# Patient Record
Sex: FEMALE | Race: AMERICAN INDIAN OR ALASKA NATIVE
[De-identification: names, ages, dates, MRNs, and addresses within clinical notes are randomized per-mention and may not be internally consistent; named-entity substitution may affect disease eponyms.]

---

## 2017-07-01 ENCOUNTER — HOSPITAL ENCOUNTER (EMERGENCY)
Dept: HOSPITAL 5 - ED | Age: 67
LOS: 1 days | Discharge: HOME | End: 2017-07-02
Payer: MEDICARE

## 2017-07-01 VITALS — SYSTOLIC BLOOD PRESSURE: 133 MMHG | DIASTOLIC BLOOD PRESSURE: 83 MMHG

## 2017-07-01 DIAGNOSIS — K80.20: Primary | ICD-10-CM

## 2017-07-01 DIAGNOSIS — J96.12: ICD-10-CM

## 2017-07-01 DIAGNOSIS — F17.200: ICD-10-CM

## 2017-07-01 DIAGNOSIS — I10: ICD-10-CM

## 2017-07-01 DIAGNOSIS — J44.9: ICD-10-CM

## 2017-07-01 DIAGNOSIS — R10.9: ICD-10-CM

## 2017-07-01 DIAGNOSIS — I50.9: ICD-10-CM

## 2017-07-01 DIAGNOSIS — E87.1: ICD-10-CM

## 2017-07-01 PROCEDURE — 71010: CPT

## 2017-07-01 PROCEDURE — 96360 HYDRATION IV INFUSION INIT: CPT

## 2017-07-01 PROCEDURE — 36415 COLL VENOUS BLD VENIPUNCTURE: CPT

## 2017-07-01 PROCEDURE — 85025 COMPLETE CBC W/AUTO DIFF WBC: CPT

## 2017-07-01 PROCEDURE — 83690 ASSAY OF LIPASE: CPT

## 2017-07-01 PROCEDURE — 96361 HYDRATE IV INFUSION ADD-ON: CPT

## 2017-07-01 PROCEDURE — 87086 URINE CULTURE/COLONY COUNT: CPT

## 2017-07-01 PROCEDURE — 99285 EMERGENCY DEPT VISIT HI MDM: CPT

## 2017-07-01 PROCEDURE — 74176 CT ABD & PELVIS W/O CONTRAST: CPT

## 2017-07-01 PROCEDURE — 80053 COMPREHEN METABOLIC PANEL: CPT

## 2017-07-01 PROCEDURE — 81001 URINALYSIS AUTO W/SCOPE: CPT

## 2017-07-01 PROCEDURE — 83880 ASSAY OF NATRIURETIC PEPTIDE: CPT

## 2017-07-02 LAB
ALBUMIN SERPL-MCNC: 3.7 G/DL (ref 3.9–5)
ALBUMIN/GLOB SERPL: 1.1 %
ALP SERPL-CCNC: 81 UNITS/L (ref 35–129)
ALT SERPL-CCNC: 7 UNITS/L (ref 7–56)
ANION GAP SERPL CALC-SCNC: 13 MMOL/L
BACTERIA #/AREA URNS HPF: (no result) /HPF
BASOPHILS NFR BLD AUTO: 0.4 % (ref 0–1.8)
BILIRUB SERPL-MCNC: 0.6 MG/DL (ref 0.1–1.2)
BILIRUB UR QL STRIP: (no result)
BLOOD UR QL VISUAL: (no result)
BUN SERPL-MCNC: 7 MG/DL (ref 7–17)
BUN/CREAT SERPL: 11.66 %
CALCIUM SERPL-MCNC: 8.8 MG/DL (ref 8.4–10.2)
CHLORIDE SERPL-SCNC: 84.5 MMOL/L (ref 98–107)
CO2 SERPL-SCNC: 37 MMOL/L (ref 22–30)
EOSINOPHIL NFR BLD AUTO: 0.5 % (ref 0–4.3)
GLUCOSE SERPL-MCNC: 97 MG/DL (ref 65–100)
HCT VFR BLD CALC: 53 % (ref 30.3–42.9)
HGB BLD-MCNC: 16.7 GM/DL (ref 10.1–14.3)
KETONES UR STRIP-MCNC: (no result) MG/DL
LEUKOCYTE ESTERASE UR QL STRIP: (no result)
LIPASE SERPL-CCNC: 17 UNITS/L (ref 13–60)
MCH RBC QN AUTO: 28 PG (ref 28–32)
MCHC RBC AUTO-ENTMCNC: 32 % (ref 30–34)
MCV RBC AUTO: 90 FL (ref 79–97)
MUCOUS THREADS #/AREA URNS HPF: (no result) /HPF
NITRITE UR QL STRIP: (no result)
PH UR STRIP: 5 [PH] (ref 5–7)
PLATELET # BLD: 149 K/MM3 (ref 140–440)
POTASSIUM SERPL-SCNC: 4.1 MMOL/L (ref 3.6–5)
PROT SERPL-MCNC: 7.2 G/DL (ref 6.3–8.2)
RBC # BLD AUTO: 5.91 M/MM3 (ref 3.65–5.03)
RBC #/AREA URNS HPF: 5 /HPF (ref 0–6)
SODIUM SERPL-SCNC: 130 MMOL/L (ref 137–145)
UROBILINOGEN UR-MCNC: 2 MG/DL (ref ?–2)
WBC # BLD AUTO: 7.4 K/MM3 (ref 4.5–11)
WBC #/AREA URNS HPF: 4 /HPF (ref 0–6)

## 2017-07-02 NOTE — XRAY REPORT
AP CHEST:



HISTORY: Hypertension



Mild improvement in cardiomegaly and pulmonary venous congestion is 

demonstrated since 11/20/16. The lungs are generally clear. No 

consolidation, pleural effusion or pneumothorax is appreciated. The 

bony structures are unremarkable.



IMPRESSION:

Cardiomegaly and pulmonary venous congestion but no CHF.

## 2017-07-02 NOTE — CAT SCAN REPORT
FINAL REPORT



PROCEDURE:  CT ABDOMEN PELVIS WO CON



TECHNIQUE:  Computerized axial tomography of the abdomen and

pelvis was performed without intravenous contrast. This study is

performed without intravascular contrast material and its

sensitivity for abdominal and pelvic pathology, including

neoplasms, inflammation, abscess, free fluid, thrombosis,

arterial dissection and infarction, is reduced compared with a

contrast enhanced study. 



HISTORY:  abd pain 



COMPARISON:  No prior studies are available for comparison.



FINDINGS:  

Visualized lower thorax: No significant abnormality.



Liver: Normal size and attenuation. There are subcentimeter

hypodensities in the right lobe of the liver which could be

cysts. 



Spleen: Normal size and attenuation.



Gallbladder and biliary system: There is cholelithiasis. There is

no specific evidence of cholecystitis or biliary ductal

dilatation..



Pancreas: Normal.



Adrenals: Normal.



Kidneys: There are bilateral kidney cysts. There are no stones.

There is no hydronephrosis..



GI tract: There is diverticulosis of the sigmoid and left colon.

There is no diverticulitis, colitis, obstruction or mass. The

appendix is normal. 



Lymph nodes and mesentery: Normal.



Vasculature: There is calcified plaque in the abdominal aorta.

There is no aneurysm..



Bladder: Normal.



Reproductive organs: Uterus is intact..



Peritoneum: There is no ascites, free air, abscess or

adenopathy..



Musculoskeletal structures: No significant abnormality.



Other: None.



IMPRESSION:  





There are subcentimeter hypodensities in the right lobe of the

liver which could be cysts. 



There is cholelithiasis. There is no specific evidence of

cholecystitis or biliary ductal dilatation..



There are bilateral kidney cysts. There are no stones. There is

no hydronephrosis..



GI tract: There is diverticulosis of the sigmoid and left colon.

There is no diverticulitis, colitis, obstruction or mass. The

appendix is normal. 



There is no ascites, free air, abscess or adenopathy..



.

## 2017-07-02 NOTE — EMERGENCY DEPARTMENT REPORT
ED General Adult HPI





- General


Chief complaint: Abdominal Pain


Stated complaint: DIARRHEA HOT FLASHES


Time Seen by Provider: 17 06:38


Source: patient


Mode of arrival: Wheelchair


Limitations: Other





- History of Present Illness


Initial comments: 


Patient states that she has not been having abdominal pain whatsoever.  She 

complains of left flank pain which she has had for the past few days.  She is 

concerned she might have a UTI.  She does not complain of dysuria however.  She 

was transported via EMS according to the [facility] complaining of "diarrhea and hot 

flashes and poor appetite."  There is no reported pain on this report.  The 

patient denies chronic pain.  However I think that is not unlikely.  She is on 

home O2 and still tobacco dependent.  She denies any significant cough.  She 

has not measured her temperature nor had any rigor or chills.  She denies any 

chest pain.  She doesn't report any acute exacerbation of her COPD.  The 

patient is on CPap at night.





-: days(s)


Location: left (flank)


Radiation: non-radiation


Quality: aching


Consistency: intermittent


Improves with: none


Worsens with: movement


Associated Symptoms: denies other symptoms


Treatments Prior to Arrival: none





- Related Data


 Previous Rx's











 Medication  Instructions  Recorded  Last Taken  Type


 


Pantoprazole [Protonix TAB] 20 mg PO BID #60 tablet. 12/16/15 Unknown Rx


 


Levofloxacin [Levaquin TAB] 500 mg PO DAILY #4 tablet 16 Unknown Rx


 


Prednisone [predniSONE 5 mg (6-Day 5 mg PO .TAPER #1 tab.ds.pk 16 Unknown 

Rx





Pack, 21 Tabs)]    


 


Sulfamethoxazole/Trimethoprim 1 each PO BID #10 tablet 17 Unknown Rx





[Bactrim DS TAB]    


 


traMADol [Ultram] 50 mg PO Q6HR PRN #14 tablet 17 Unknown Rx











 Allergies











Allergy/AdvReac Type Severity Reaction Status Date / Time


 


No Known Allergies Allergy   Unverified 12/10/14 14:04














ED Review of Systems


ROS: 


Stated complaint: DIARRHEA HOT FLASHES


Other details as noted in HPI





Constitutional: denies: chills, fever


Eyes: denies: eye pain, eye discharge, vision change


ENT: denies: ear pain, throat pain


Respiratory: shortness of breath (chronic overly).  denies: cough, wheezing


Cardiovascular: denies: chest pain, palpitations


Endocrine: no symptoms reported


Gastrointestinal: diarrhea (states occasional but not recently).  denies: 

abdominal pain, nausea


Genitourinary: frequency.  denies: urgency, dysuria, discharge


Musculoskeletal: as per HPI, back pain (left flank).  denies: joint swelling, 

arthralgia


Skin: denies: rash, lesions


Neurological: denies: headache, weakness, paresthesias


Psychiatric: denies: anxiety, depression


Hematological/Lymphatic: denies: easy bleeding, easy bruising





ED Past Medical Hx





- Past Medical History


Previous Medical History?: Yes


Hx Hypertension: Yes


Hx Congestive Heart Failure: Yes


Hx Asthma: No


Hx COPD: Yes (3L home O2)





- Surgical History


Additional Surgical History: .  TUBAL LIGATION





- Social History


Smoking Status: Current Some Day Smoker





- Medications


Home Medications: 


 Home Medications











 Medication  Instructions  Recorded  Confirmed  Last Taken  Type


 


Pantoprazole [Protonix TAB] 20 mg PO BID #60 tablet. 12/16/15 11/21/16 

Unknown Rx


 


Levofloxacin [Levaquin TAB] 500 mg PO DAILY #4 tablet 16  Unknown Rx


 


Prednisone [predniSONE 5 mg (6-Day 5 mg PO .TAPER #1 tab.ds.pk 16  

Unknown Rx





Pack, 21 Tabs)]     


 


Sulfamethoxazole/Trimethoprim 1 each PO BID #10 tablet 17  Unknown Rx





[Bactrim DS TAB]     


 


traMADol [Ultram] 50 mg PO Q6HR PRN #14 tablet 17  Unknown Rx














ED Physical Exam





- General


Limitations: Altered Mental Status


General appearance: alert, in no apparent distress





- Head


Head exam: Present: atraumatic, normocephalic





- Eye


Eye exam: Present: normal appearance.  Absent: scleral icterus





- ENT


ENT exam: Present: normal exam, mucous membranes moist





- Neck


Neck exam: Present: normal inspection.  Absent: tenderness, meningismus





- Respiratory


Respiratory exam: Present: normal lung sounds bilaterally.  Absent: respiratory 

distress





- Cardiovascular


Cardiovascular Exam: Present: regular rate, normal rhythm.  Absent: systolic 

murmur, diastolic murmur, rubs, gallop





- GI/Abdominal


GI/Abdominal exam: Present: soft, normal bowel sounds.  Absent: distended, 

tenderness, guarding, rebound, rigid





- Extremities Exam


Extremities exam: Present: normal inspection.  Absent: joint swelling, calf 

tenderness





- Back Exam


Back exam: Present: normal inspection, CVA tenderness (L) (perhaps mild), 

muscle spasm (perhaps some).  Absent: CVA tenderness (R), paraspinal tenderness

, vertebral tenderness





- Neurological Exam


Neurological exam: Present: alert, oriented X3, CN II-XII intact.  Absent: 

motor sensory deficit





- Psychiatric


Psychiatric exam: Present: normal affect, normal mood





- Skin


Skin exam: Present: warm, dry, intact, normal color.  Absent: rash





ED Course


 Vital Signs











  17





  23:04 05:24


 


Temperature 98.3 F 


 


Pulse Rate 75 


 


Respiratory 20 





Rate  


 


Blood Pressure 133/83 


 


O2 Sat by Pulse 90 96





Oximetry  














- Reevaluation(s)


Reevaluation #1: 


Patient is given IV fluids.  This was well tolerated.  She was found to be 

somewhat hyponatremic with a sodium 1:30.  Her white count was 7.4 there is no 

indication of a systemic infection.  A proBNP is quite high 3550 however chest x

-ray showed no evidence of acute pulmonary edema or decompensation.  Again she 

did well after the fluid.  I suspect patient has pulmonary hypertension.  She 

responded well to oral analgesia.





A CT of her abdomen showed incidental gallstones but no correlating finding.  

The patient was discharged in stable condition she was counseled concerning.  

Tobacco dependency.  She certainly is at high risk of CO2 retention and 

hypoxia.  However this is all chronic and there is no indication for acute 

hospitalization at this time.


17 09:37








ED Medical Decision Making





- Lab Data


Result diagrams: 


 17 00:02





 17 00:02








 Laboratory Results - last 24 hr











  17





  00:02 00:02 00:02


 


WBC  7.4  


 


RBC  5.91 H  


 


Hgb  16.7 H  


 


Hct  53.0 H  


 


MCV  90  


 


MCH  28  


 


MCHC  32  


 


RDW  18.3 H  


 


Plt Count  149  


 


Lymph % (Auto)  22.0  


 


Mono % (Auto)  5.6  


 


Eos % (Auto)  0.5  


 


Baso % (Auto)  0.4  


 


Lymph #  1.6  


 


Mono #  0.4  


 


Eos #  0.0  


 


Baso #  0.0  


 


Seg Neutrophils %  71.5 H  


 


Seg Neutrophils #  5.3  


 


Sodium   130 L 


 


Potassium   4.1 


 


Chloride   84.5 L 


 


Carbon Dioxide   37 H 


 


Anion Gap   13 


 


BUN   7 


 


Creatinine   0.6 L 


 


Estimated GFR   > 60 


 


BUN/Creatinine Ratio   11.66 


 


Glucose   97 


 


Calcium   8.8 


 


Total Bilirubin   0.60 


 


AST   13 


 


ALT   7 


 


Alkaline Phosphatase   81 


 


NT-Pro-B Natriuret Pep    3550 H


 


Total Protein   7.2 


 


Albumin   3.7 L 


 


Albumin/Globulin Ratio   1.1 


 


Lipase   17 


 


Urine Color   


 


Urine Turbidity   


 


Urine pH   


 


Ur Specific Gravity   


 


Urine Protein   


 


Urine Glucose (UA)   


 


Urine Ketones   


 


Urine Blood   


 


Urine Nitrite   


 


Urine Bilirubin   


 


Urine Urobilinogen   


 


Ur Leukocyte Esterase   


 


Urine WBC (Auto)   


 


Urine RBC (Auto)   


 


U Epithel Cells (Auto)   


 


Urine Bacteria (Auto)   


 


Hyaline Casts   


 


Urine Mucus   














  17





  00:28


 


WBC 


 


RBC 


 


Hgb 


 


Hct 


 


MCV 


 


MCH 


 


MCHC 


 


RDW 


 


Plt Count 


 


Lymph % (Auto) 


 


Mono % (Auto) 


 


Eos % (Auto) 


 


Baso % (Auto) 


 


Lymph # 


 


Mono # 


 


Eos # 


 


Baso # 


 


Seg Neutrophils % 


 


Seg Neutrophils # 


 


Sodium 


 


Potassium 


 


Chloride 


 


Carbon Dioxide 


 


Anion Gap 


 


BUN 


 


Creatinine 


 


Estimated GFR 


 


BUN/Creatinine Ratio 


 


Glucose 


 


Calcium 


 


Total Bilirubin 


 


AST 


 


ALT 


 


Alkaline Phosphatase 


 


NT-Pro-B Natriuret Pep 


 


Total Protein 


 


Albumin 


 


Albumin/Globulin Ratio 


 


Lipase 


 


Urine Color  Yellow


 


Urine Turbidity  Cloudy


 


Urine pH  5.0


 


Ur Specific Gravity  1.013


 


Urine Protein  30 mg/dl


 


Urine Glucose (UA)  Neg


 


Urine Ketones  Neg


 


Urine Blood  Neg


 


Urine Nitrite  Neg


 


Urine Bilirubin  Neg


 


Urine Urobilinogen  2.0


 


Ur Leukocyte Esterase  Tr


 


Urine WBC (Auto)  4.0


 


Urine RBC (Auto)  5.0


 


U Epithel Cells (Auto)  40.0 H


 


Urine Bacteria (Auto)  2+


 


Hyaline Casts  4


 


Urine Mucus  Few











Critical care attestation.: 


If time is entered above; I have spent that time in minutes in the direct care 

of this critically ill patient, excluding procedure time.








ED Disposition


Clinical Impression: 


 Left flank pain, Hyponatremia





COPD (chronic obstructive pulmonary disease)


Qualifiers:


 COPD type: unspecified COPD Qualified Code(s): J44.9 - Chronic obstructive 

pulmonary disease, unspecified





Chronic respiratory failure


Qualifiers:


 Respiratory failure complication: hypercapnia Qualified Code(s): J96.12 - 

Chronic respiratory failure with hypercapnia





Cholelithiasis


Qualifiers:


 Cholelithiasis location: gallbladder Cholecystitis presence: without 

cholecystitis Biliary obstruction: without biliary obstruction Qualified Code(s)

: K80.20 - Calculus of gallbladder without cholecystitis without obstruction





Disposition:  TO HOME OR SELFCARE


Is pt being admited?: No


Does the pt Need Aspirin: No


Condition: Stable


Instructions:  Abdominal Pain (ED), Chronic Obstructive Pulmonary Disease (ED), 

Flank Pain (ED)


Additional Instructions: 


Follow-up with your primary care doctor and lung specialist.  He was found to 

have gallstones which are unrelated to her current left back pain.  It is 

essential that you stop smoking or you will predictably develop serious 

respiratory failure and requirements for hospitalization in the future and 

increased risk of death.





Return to the emergency department any acute change or problem.


Prescriptions: 


Sulfamethoxazole/Trimethoprim [Bactrim DS TAB] 1 each PO BID #10 tablet


traMADol [Ultram] 50 mg PO Q6HR PRN #14 tablet


 PRN Reason: Pain


Referrals: 


PRIMARY CARE,MD [Primary Care Provider] - 3-5 Days


Time of Disposition: 09:42

## 2018-09-14 ENCOUNTER — HOSPITAL ENCOUNTER (OUTPATIENT)
Dept: HOSPITAL 5 - VAS | Age: 68
Discharge: HOME | End: 2018-09-14
Attending: INTERNAL MEDICINE
Payer: MEDICARE

## 2018-09-14 DIAGNOSIS — E66.9: ICD-10-CM

## 2018-09-14 DIAGNOSIS — J44.9: ICD-10-CM

## 2018-09-14 DIAGNOSIS — I13.0: ICD-10-CM

## 2018-09-14 DIAGNOSIS — I27.20: Primary | ICD-10-CM

## 2018-09-14 DIAGNOSIS — K21.9: ICD-10-CM

## 2018-09-14 DIAGNOSIS — I50.9: ICD-10-CM

## 2018-09-14 DIAGNOSIS — F17.210: ICD-10-CM

## 2018-09-14 LAB
ALBUMIN SERPL-MCNC: 4.1 G/DL (ref 3.9–5)
ALT SERPL-CCNC: 6 UNITS/L (ref 7–56)
BUN SERPL-MCNC: 9 MG/DL (ref 7–17)
BUN/CREAT SERPL: 15 %
CALCIUM SERPL-MCNC: 9.5 MG/DL (ref 8.4–10.2)
HCT VFR BLD CALC: 43.3 % (ref 30.3–42.9)
HDLC SERPL-MCNC: 55 MG/DL (ref 40–59)
HEMOLYSIS INDEX: 2
HGB BLD-MCNC: 14.2 GM/DL (ref 10.1–14.3)
MCH RBC QN AUTO: 31 PG (ref 28–32)
MCHC RBC AUTO-ENTMCNC: 33 % (ref 30–34)
MCV RBC AUTO: 95 FL (ref 79–97)
PLATELET # BLD: 197 K/MM3 (ref 140–440)
RBC # BLD AUTO: 4.56 M/MM3 (ref 3.65–5.03)

## 2018-09-14 PROCEDURE — 93970 EXTREMITY STUDY: CPT

## 2018-09-14 PROCEDURE — 80053 COMPREHEN METABOLIC PANEL: CPT

## 2018-09-14 PROCEDURE — 85027 COMPLETE CBC AUTOMATED: CPT

## 2018-09-14 PROCEDURE — 36415 COLL VENOUS BLD VENIPUNCTURE: CPT

## 2018-09-14 PROCEDURE — 80061 LIPID PANEL: CPT

## 2018-09-14 PROCEDURE — 84443 ASSAY THYROID STIM HORMONE: CPT

## 2018-09-14 PROCEDURE — 84436 ASSAY OF TOTAL THYROXINE: CPT

## 2018-09-14 PROCEDURE — 71046 X-RAY EXAM CHEST 2 VIEWS: CPT

## 2018-09-14 NOTE — XRAY REPORT
CHEST 2 VIEWS



INDICATION: COPD.



COMPARISON: 5/13/2018



FINDINGS: PA and lateral chest radiographs demonstrate stable 

cardiomediastinal silhouette.  Prominent/enlarged benny again represent 

pulmonary arterial hypertension.  Otherwise clear lungs.  Aortic knob 

calcifications.  Demineralized bones with mild thoracic spine 

degenerative changes.



CONCLUSION: Pulmonary arterial hypertension without acute chest 

process, as described.



Thank you for the opportunity to participate in this patient's care.

## 2020-05-16 ENCOUNTER — HOSPITAL ENCOUNTER (INPATIENT)
Dept: HOSPITAL 5 - ED | Age: 70
LOS: 11 days | DRG: 208 | End: 2020-05-27
Attending: INTERNAL MEDICINE | Admitting: INTERNAL MEDICINE
Payer: MEDICARE

## 2020-05-16 DIAGNOSIS — I27.29: ICD-10-CM

## 2020-05-16 DIAGNOSIS — K72.00: ICD-10-CM

## 2020-05-16 DIAGNOSIS — E66.01: ICD-10-CM

## 2020-05-16 DIAGNOSIS — J44.1: ICD-10-CM

## 2020-05-16 DIAGNOSIS — N17.0: ICD-10-CM

## 2020-05-16 DIAGNOSIS — D63.8: ICD-10-CM

## 2020-05-16 DIAGNOSIS — D69.6: ICD-10-CM

## 2020-05-16 DIAGNOSIS — I46.9: ICD-10-CM

## 2020-05-16 DIAGNOSIS — Z99.81: ICD-10-CM

## 2020-05-16 DIAGNOSIS — I50.43: ICD-10-CM

## 2020-05-16 DIAGNOSIS — E43: ICD-10-CM

## 2020-05-16 DIAGNOSIS — E87.3: ICD-10-CM

## 2020-05-16 DIAGNOSIS — F17.210: ICD-10-CM

## 2020-05-16 DIAGNOSIS — I95.89: ICD-10-CM

## 2020-05-16 DIAGNOSIS — E16.2: ICD-10-CM

## 2020-05-16 DIAGNOSIS — N18.4: ICD-10-CM

## 2020-05-16 DIAGNOSIS — E88.09: ICD-10-CM

## 2020-05-16 DIAGNOSIS — I27.20: ICD-10-CM

## 2020-05-16 DIAGNOSIS — I42.0: ICD-10-CM

## 2020-05-16 DIAGNOSIS — E87.70: ICD-10-CM

## 2020-05-16 DIAGNOSIS — Z98.51: ICD-10-CM

## 2020-05-16 DIAGNOSIS — R57.1: ICD-10-CM

## 2020-05-16 DIAGNOSIS — G92: ICD-10-CM

## 2020-05-16 DIAGNOSIS — I48.92: ICD-10-CM

## 2020-05-16 DIAGNOSIS — E87.2: ICD-10-CM

## 2020-05-16 DIAGNOSIS — I27.81: ICD-10-CM

## 2020-05-16 DIAGNOSIS — J96.02: ICD-10-CM

## 2020-05-16 DIAGNOSIS — E87.1: ICD-10-CM

## 2020-05-16 DIAGNOSIS — I13.0: ICD-10-CM

## 2020-05-16 DIAGNOSIS — J96.21: Primary | ICD-10-CM

## 2020-05-16 LAB
ALBUMIN SERPL-MCNC: 3.4 G/DL (ref 3.9–5)
ALT SERPL-CCNC: 5 UNITS/L (ref 7–56)
BASOPHILS # (AUTO): 0.1 K/MM3 (ref 0–0.1)
BASOPHILS NFR BLD AUTO: 0.6 % (ref 0–1.8)
BUN SERPL-MCNC: 9 MG/DL (ref 7–17)
BUN/CREAT SERPL: 13 %
CALCIUM SERPL-MCNC: 8.7 MG/DL (ref 8.4–10.2)
EOSINOPHIL # BLD AUTO: 0.1 K/MM3 (ref 0–0.4)
EOSINOPHIL NFR BLD AUTO: 0.8 % (ref 0–4.3)
HCT VFR BLD CALC: 43.2 % (ref 30.3–42.9)
HEMOLYSIS INDEX: 6
HGB BLD-MCNC: 13.7 GM/DL (ref 10.1–14.3)
LYMPHOCYTES # BLD AUTO: 1.4 K/MM3 (ref 1.2–5.4)
LYMPHOCYTES NFR BLD AUTO: 16.4 % (ref 13.4–35)
MCHC RBC AUTO-ENTMCNC: 32 % (ref 30–34)
MCV RBC AUTO: 107 FL (ref 79–97)
MONOCYTES # (AUTO): 0.4 K/MM3 (ref 0–0.8)
MONOCYTES % (AUTO): 4.9 % (ref 0–7.3)
PLATELET # BLD: 153 K/MM3 (ref 140–440)
RBC # BLD AUTO: 4.06 M/MM3 (ref 3.65–5.03)

## 2020-05-16 PROCEDURE — 84156 ASSAY OF PROTEIN URINE: CPT

## 2020-05-16 PROCEDURE — 82550 ASSAY OF CK (CPK): CPT

## 2020-05-16 PROCEDURE — 85018 HEMOGLOBIN: CPT

## 2020-05-16 PROCEDURE — 81001 URINALYSIS AUTO W/SCOPE: CPT

## 2020-05-16 PROCEDURE — 84300 ASSAY OF URINE SODIUM: CPT

## 2020-05-16 PROCEDURE — 74018 RADEX ABDOMEN 1 VIEW: CPT

## 2020-05-16 PROCEDURE — 93005 ELECTROCARDIOGRAM TRACING: CPT

## 2020-05-16 PROCEDURE — 85027 COMPLETE CBC AUTOMATED: CPT

## 2020-05-16 PROCEDURE — 83735 ASSAY OF MAGNESIUM: CPT

## 2020-05-16 PROCEDURE — 76700 US EXAM ABDOM COMPLETE: CPT

## 2020-05-16 PROCEDURE — 85520 HEPARIN ASSAY: CPT

## 2020-05-16 PROCEDURE — 80048 BASIC METABOLIC PNL TOTAL CA: CPT

## 2020-05-16 PROCEDURE — 71045 X-RAY EXAM CHEST 1 VIEW: CPT

## 2020-05-16 PROCEDURE — 84100 ASSAY OF PHOSPHORUS: CPT

## 2020-05-16 PROCEDURE — 84484 ASSAY OF TROPONIN QUANT: CPT

## 2020-05-16 PROCEDURE — 93306 TTE W/DOPPLER COMPLETE: CPT

## 2020-05-16 PROCEDURE — 85007 BL SMEAR W/DIFF WBC COUNT: CPT

## 2020-05-16 PROCEDURE — 93970 EXTREMITY STUDY: CPT

## 2020-05-16 PROCEDURE — 71250 CT THORAX DX C-: CPT

## 2020-05-16 PROCEDURE — 80061 LIPID PANEL: CPT

## 2020-05-16 PROCEDURE — 82962 GLUCOSE BLOOD TEST: CPT

## 2020-05-16 PROCEDURE — 86022 PLATELET ANTIBODIES: CPT

## 2020-05-16 PROCEDURE — 36415 COLL VENOUS BLD VENIPUNCTURE: CPT

## 2020-05-16 PROCEDURE — 80053 COMPREHEN METABOLIC PANEL: CPT

## 2020-05-16 PROCEDURE — 36600 WITHDRAWAL OF ARTERIAL BLOOD: CPT

## 2020-05-16 PROCEDURE — 94760 N-INVAS EAR/PLS OXIMETRY 1: CPT

## 2020-05-16 PROCEDURE — 94660 CPAP INITIATION&MGMT: CPT

## 2020-05-16 PROCEDURE — 94002 VENT MGMT INPAT INIT DAY: CPT

## 2020-05-16 PROCEDURE — 85049 AUTOMATED PLATELET COUNT: CPT

## 2020-05-16 PROCEDURE — 83880 ASSAY OF NATRIURETIC PEPTIDE: CPT

## 2020-05-16 PROCEDURE — 94640 AIRWAY INHALATION TREATMENT: CPT

## 2020-05-16 PROCEDURE — 82570 ASSAY OF URINE CREATININE: CPT

## 2020-05-16 PROCEDURE — 84295 ASSAY OF SERUM SODIUM: CPT

## 2020-05-16 PROCEDURE — 80162 ASSAY OF DIGOXIN TOTAL: CPT

## 2020-05-16 PROCEDURE — 85730 THROMBOPLASTIN TIME PARTIAL: CPT

## 2020-05-16 PROCEDURE — 82803 BLOOD GASES ANY COMBINATION: CPT

## 2020-05-16 PROCEDURE — 31500 INSERT EMERGENCY AIRWAY: CPT

## 2020-05-16 PROCEDURE — 85014 HEMATOCRIT: CPT

## 2020-05-16 PROCEDURE — 85025 COMPLETE CBC W/AUTO DIFF WBC: CPT

## 2020-05-16 PROCEDURE — 94003 VENT MGMT INPAT SUBQ DAY: CPT

## 2020-05-16 PROCEDURE — 85610 PROTHROMBIN TIME: CPT

## 2020-05-16 PROCEDURE — 82553 CREATINE MB FRACTION: CPT

## 2020-05-16 PROCEDURE — 83935 ASSAY OF URINE OSMOLALITY: CPT

## 2020-05-16 NOTE — EMERGENCY DEPARTMENT REPORT
ED Chest Pain HPI





- General


Chief Complaint: Chest Pain


Stated Complaint: SOB/PEDAL EDEMA


Time Seen by Provider: 20 21:42


Source: patient, EMS


Mode of arrival: Stretcher


Limitations: No Limitations





- History of Present Illness


Initial Comments: 





Patient is 70 years old female with history of congestive heart failure, COPD 

and hypertension.  Patient is on home oxygen.  Patient brought to the emergency 

room via EMS from home for evaluation of left-sided chest pain, tightness in 

nature with no radiation.  Patient stated that pain associated with shortness of

breath and bilateral lower extremity swelling.  Patient stated that she ran out 

of her Lasix long time ago and she did not have any medication with.  Patient 

denied any fever or chills.  Patient also denied being in contact with patient 

with COVID-19's since she is doing self quarantine for the last 3 months.


MD Complaint: chest pain


-: Last night


Onset: during rest


Pain Location: left chest


Pain Radiation: none


Severity: moderate


Severity scale (0 -10): 6


Quality: tightness





- Related Data


                                  Previous Rx's











 Medication  Instructions  Recorded  Last Taken  Type


 


Pantoprazole [Protonix TAB] 20 mg PO BID #60 tablet. 12/16/15 Unknown Rx


 


Sulfamethoxazole/Trimethoprim 1 each PO BID #16 tablet 05/15/18 Unknown Rx





[Bactrim DS TAB]    


 


oxyCODONE /ACETAMINOPHEN [Percocet 1 tab PO Q6H PRN #10 tablet 05/15/18 Unknown 

Rx





5/325 mg]    











                                    Allergies











Allergy/AdvReac Type Severity Reaction Status Date / Time


 


No Known Allergies Allergy   Unverified 18 11:23














Heart Score





- HEART Score


History: Moderately suspicious


EKG: Non-specific


Age: > 65


Risk factors: > 3 risk factors or hx of atherosclerotic disease


Troponin: < normal limit


HEART Score: 6





- Critical Actions


Critical Actions: 4-6 pts:12-16.6% risk of adverse cardiac event. Should be 

admitted





ED Review of Systems


ROS: 


Stated complaint: SOB/PEDAL EDEMA


Other details as noted in HPI





Comment: All other systems reviewed and negative


Constitutional: denies: chills, fever


Respiratory: orthopnea, shortness of breath, SOB with exertion.  denies: cough, 

stridor, wheezing


Cardiovascular: chest pain, dyspnea on exertion, orthopnea.  denies: 

palpitations


Gastrointestinal: denies: abdominal pain, nausea, vomiting, diarrhea, 

constipation, hematemesis, melena, hematochezia


Musculoskeletal: denies: back pain


Neurological: denies: headache, weakness, numbness, paresthesias, confusion, 

abnormal gait





ED Past Medical Hx





- Past Medical History


Hx Hypertension: Yes


Hx Congestive Heart Failure: Yes


Hx Seizures: No


Hx Asthma: No


Hx COPD: Yes (3L home O2)





- Surgical History


Past Surgical History?: Yes


Additional Surgical History: .  TUBAL LIGATION





- Social History


Smoking Status: Never Smoker


Substance Use Type: None





- Medications


Home Medications: 


                                Home Medications











 Medication  Instructions  Recorded  Confirmed  Last Taken  Type


 


Pantoprazole [Protonix TAB] 20 mg PO BID #60 tablet. 12/16/15 05/14/18 Unknown

Rx


 


Sulfamethoxazole/Trimethoprim 1 each PO BID #16 tablet 05/15/18  Unknown Rx





[Bactrim DS TAB]     


 


oxyCODONE /ACETAMINOPHEN [Percocet 1 tab PO Q6H PRN #10 tablet 05/15/18  Unknown

 Rx





5/325 mg]     














ED Physical Exam





- General


Limitations: No Limitations


General appearance: alert, in distress (Moderate respiratory distress.)





- Head


Head exam: Present: atraumatic, normocephalic, normal inspection





- Eye


Eye exam: Present: normal appearance





- ENT


ENT exam: Present: normal exam, normal orophraynx, mucous membranes moist





- Neck


Neck exam: Present: normal inspection, full ROM.  Absent: tenderness, 

meningismus, lymphadenopathy, thyromegaly





- Respiratory


Respiratory exam: Present: respiratory distress, rales.  Absent: rhonchi, 

accessory muscle use





- Cardiovascular


Cardiovascular Exam: Present: tachycardia





- GI/Abdominal


GI/Abdominal exam: Present: soft, normal bowel sounds.  Absent: distended, 

tenderness, guarding, rebound, rigid, organomegaly, mass, bruit, pulsatile mass





- Extremities Exam


Extremities exam: Present: normal inspection, full ROM, normal capillary refill,

pedal edema.  Absent: tenderness, calf tenderness





- Back Exam


Back exam: Present: normal inspection, full ROM.  Absent: CVA tenderness (R), 

CVA tenderness (L), muscle spasm, paraspinal tenderness, vertebral tenderness





- Neurological Exam


Neurological exam: Present: alert, oriented X3, CN II-XII intact, normal gait, 

reflexes normal.  Absent: motor sensory deficit





- Psychiatric


Psychiatric exam: Present: normal mood





- Skin


Skin exam: Present: warm, intact, normal color





ED Course


                                   Vital Signs











  20





  21:51 21:56 22:47


 


Pulse Rate 116 H  108 H


 


Respiratory 20 20 25 H





Rate   


 


Blood Pressure   126/78


 


Blood Pressure 119/74  





[Right]   


 


O2 Sat by Pulse 94 94 90





Oximetry   














WALLY score





- Wally Score


Age > 65: (1) Yes


Aspirin use within the Past 7 Days: (0) No


3 or more CAD Risk Factors: (0) No


2 or more Angina events in past 24 hrs: (0) No


Known CAD with more than 50% Stenosis: (0) No


Elevated Cardiac Markers: (0) No


ST Deviation Greater than 0.5mm: (0) No


WALLY Score: 1





ED Medical Decision Making





- Lab Data


Result diagrams: 


                                 20 22:13





                                 20 22:13





- EKG Data


-: EKG Interpreted by Me


EKG shows normal: sinus rhythm


Rate: tachycardia





- EKG Data


Interpretation: no acute changes





- Radiology Data


Radiology results: report reviewed





- Medical Decision Making





Patient is 70 years old female with history of congestive heart failure, COPD 

and hypertension.  Patient is on home oxygen.  Patient brought to the emergency 

room via EMS from home for evaluation of left-sided chest pain, tightness in 

nature with no radiation.  Patient stated that pain associated with shortness of

 breath and bilateral lower extremity swelling.  Patient stated that she ran out

 of her Lasix long time ago and she did not have any medication with.  Patient 

denied any fever or chills.  Patient also denied being in contact with patient 

with COVID-19's since she is doing self quarantine for the last 3 months.





Patient received Lasix 40 mg IV.  Chest x-ray showed pulmonary edema with 

bilateral pleural effusion.  EKG showed no ST elevation.  Troponin first 

troponin is negative.  Patient found to be in CHF exacerbation.  Patient 

discussed with , he agreed to admit the patient to medical service for 

further management.


Critical Care Time: Yes


Critical care time in (mins) excluding proc time.: 30


Critical care attestation.: 


If time is entered above; I have spent that time in minutes in the direct care 

of this critically ill patient, excluding procedure time.








ED Disposition


Clinical Impression: 


 Chest pain, CHF exacerbation





Disposition:  OP ADMIT IP TO THIS HOSP


Is pt being admited?: Yes


Condition: Stable


Instructions:  Chest Pain (ED)

## 2020-05-16 NOTE — XRAY REPORT
CHEST 1 VIEW 



INDICATION / CLINICAL INFORMATION:

Chest Pain.



COMPARISON: 

9/14/2018



FINDINGS:



SUPPORT DEVICES: None.

HEART / MEDIASTINUM: There is enlargement of the cardiac silhouette which is developed in the interva
l. There is prominence of the benny which appears similar to the previous exam.

LUNGS / PLEURA: There is mild venous congestion. There is mild bilateral interstitial disease charact
eristic of edema..  No pneumothorax. 



ADDITIONAL FINDINGS: No significant additional findings.



IMPRESSION:

There is enlargement of the cardiac silhouette. There is venous congestion and mild bilateral pulmona
ry edema.



There is bilateral hilar prominence which appears similar to the prior study likely representing prom
inent pulmonary arteries.





Signer Name: Manjit Yang MD 

Signed: 5/16/2020 10:46 PM

Workstation Name: [id number]

## 2020-05-17 LAB
APTT BLD: 29 SEC. (ref 24.2–36.6)
BASOPHILS # (AUTO): 0 K/MM3 (ref 0–0.1)
BASOPHILS # (AUTO): 0 K/MM3 (ref 0–0.1)
BASOPHILS NFR BLD AUTO: 0.3 % (ref 0–1.8)
BASOPHILS NFR BLD AUTO: 0.5 % (ref 0–1.8)
BUN SERPL-MCNC: 9 MG/DL (ref 7–17)
BUN SERPL-MCNC: 9 MG/DL (ref 7–17)
BUN/CREAT SERPL: 11 %
BUN/CREAT SERPL: 13 %
CALCIUM SERPL-MCNC: 8.7 MG/DL (ref 8.4–10.2)
CALCIUM SERPL-MCNC: 8.8 MG/DL (ref 8.4–10.2)
EOSINOPHIL # BLD AUTO: 0 K/MM3 (ref 0–0.4)
EOSINOPHIL # BLD AUTO: 0 K/MM3 (ref 0–0.4)
EOSINOPHIL NFR BLD AUTO: 0.3 % (ref 0–4.3)
EOSINOPHIL NFR BLD AUTO: 0.4 % (ref 0–4.3)
HCT VFR BLD CALC: 42.4 % (ref 30.3–42.9)
HCT VFR BLD CALC: 45.8 % (ref 30.3–42.9)
HDLC SERPL-MCNC: 49 MG/DL (ref 40–59)
HEMOLYSIS INDEX: 20
HEMOLYSIS INDEX: 8
HGB BLD-MCNC: 13.5 GM/DL (ref 10.1–14.3)
HGB BLD-MCNC: 14.2 GM/DL (ref 10.1–14.3)
INR PPP: 1.2 (ref 0.87–1.13)
LYMPHOCYTES # BLD AUTO: 1.3 K/MM3 (ref 1.2–5.4)
LYMPHOCYTES # BLD AUTO: 1.4 K/MM3 (ref 1.2–5.4)
LYMPHOCYTES NFR BLD AUTO: 15 % (ref 13.4–35)
LYMPHOCYTES NFR BLD AUTO: 16.4 % (ref 13.4–35)
MCHC RBC AUTO-ENTMCNC: 31 % (ref 30–34)
MCHC RBC AUTO-ENTMCNC: 32 % (ref 30–34)
MCV RBC AUTO: 106 FL (ref 79–97)
MCV RBC AUTO: 107 FL (ref 79–97)
MONOCYTES # (AUTO): 0.5 K/MM3 (ref 0–0.8)
MONOCYTES # (AUTO): 0.5 K/MM3 (ref 0–0.8)
MONOCYTES % (AUTO): 5.5 % (ref 0–7.3)
MONOCYTES % (AUTO): 5.8 % (ref 0–7.3)
PLATELET # BLD: 155 K/MM3 (ref 140–440)
PLATELET # BLD: 170 K/MM3 (ref 140–440)
RBC # BLD AUTO: 3.98 M/MM3 (ref 3.65–5.03)
RBC # BLD AUTO: 4.3 M/MM3 (ref 3.65–5.03)

## 2020-05-17 PROCEDURE — 4A033R1 MEASUREMENT OF ARTERIAL SATURATION, PERIPHERAL, PERCUTANEOUS APPROACH: ICD-10-PCS | Performed by: INTERNAL MEDICINE

## 2020-05-17 RX ADMIN — Medication SCH ML: at 21:41

## 2020-05-17 RX ADMIN — BUDESONIDE SCH MG: 0.5 INHALANT RESPIRATORY (INHALATION) at 20:37

## 2020-05-17 RX ADMIN — Medication SCH ML: at 10:49

## 2020-05-17 RX ADMIN — HEPARIN SODIUM SCH MLS/HR: 5000 INJECTION, SOLUTION INTRAVENOUS at 18:02

## 2020-05-17 RX ADMIN — IPRATROPIUM BROMIDE AND ALBUTEROL SULFATE SCH AMPUL: .5; 3 SOLUTION RESPIRATORY (INHALATION) at 20:37

## 2020-05-17 RX ADMIN — FUROSEMIDE SCH MG: 10 INJECTION, SOLUTION INTRAVENOUS at 18:17

## 2020-05-17 RX ADMIN — FUROSEMIDE SCH MG: 10 INJECTION, SOLUTION INTRAVENOUS at 05:05

## 2020-05-17 RX ADMIN — HEPARIN SODIUM SCH MLS/HR: 5000 INJECTION, SOLUTION INTRAVENOUS at 06:21

## 2020-05-17 RX ADMIN — ARFORMOTEROL TARTRATE SCH MCG: 15 SOLUTION RESPIRATORY (INHALATION) at 20:37

## 2020-05-17 RX ADMIN — ASPIRIN SCH MG: 325 TABLET, COATED ORAL at 10:48

## 2020-05-17 NOTE — CONSULTATION
History of Present Illness


Consult date: 20


Consult reason: chest pain


History of present illness: 





This patient is a 70-year-old woman with a history of COPD on home oxygen, pr

esented to the hospital with increasing shortness of breath and admitted for 

further management.  It is described in the emergency room that she needed 

additional therapy with oxygen for O2 desaturation on her usual 2.5 L of nasal 

oxygen.  The patient denies chest pain, palpitations or any additional cardiac 

complaints.  Her usual pulmonologist is Dr. Nunes, and her usual cardiologist is

at [facility].





Cardiology consultation was requested for chest pain, but patient denies any ch

est pain or angina type symptoms.  Notably, since her presentation she has been 

in a low-grade tachycardia.  My serial review of her ECGs show this to be a slow

atrial flutter with 2-1 AV conduction and a ventricular rate of 116.  Otherwise,

no ischemic changes on the EKG.  Chest x-ray shows an enlarged cardiac 

silhouette, mostly consisting of right heart enlargement.  There is mild 

increase in interstitial lung markings.





Past History


Past Medical History: COPD, hypertension


Past Surgical History: , Other (Tubal ligation)


Social history: smoking (Smokes about half a pack of cigarette daily)


Family history: no significant family history





Medications and Allergies


                                    Allergies











Allergy/AdvReac Type Severity Reaction Status Date / Time


 


No Known Allergies Allergy   Unverified 18 11:23











                                Home Medications











 Medication  Instructions  Recorded  Confirmed  Last Taken  Type


 


No Known Home Medications [No  20 Unknown History





Reported Home Medications]     











Active Meds: 


Active Medications





Acetaminophen (Tylenol)  650 mg PO Q4H PRN


   PRN Reason: Pain MILD(1-3)/Fever >100.5/HA


Albuterol (Proventil)  2.5 mg IH Q4HRT PRN


   PRN Reason: Shortness Of Breath


Albuterol/Ipratropium (Duoneb *Not For Prn Use*)  1 ampul IH TIDRT [facility]


Arformoterol Tartrate (Brovana Nebu)  15 mcg IH Q12HRT [facility]


Aspirin (Ecotrin)  325 mg PO QDAY [facility]


   Last Admin: 20 10:48 Dose:  325 mg


   Documented by: 


Atorvastatin Calcium (Lipitor)  40 mg PO QHS [facility]


Budesonide (Pulmicort)  0.5 mg IH Q12HRT [facility]


Furosemide (Lasix)  40 mg IV BID@0600,1800 [facility]


   Last Admin: 20 05:05 Dose:  40 mg


   Documented by: 


Heparin Sodium/Sodium Chloride (Heparin/ 0.45% Nacl-25,000 Unit/500 Ml)  25,000 

unit in 500 mls @ 20 mls/hr IV TITRATE AMANDA; Protocol


   Last Admin: 20 06:21 Dose:  1,000 units/hr, 20 mls/hr


   Documented by: 


Magnesium Hydroxide (Milk Of Magnesia)  30 ml PO Q4H PRN


   PRN Reason: Constipation


Morphine Sulfate (Morphine)  2 mg IV Q5MIN PRN


   PRN Reason: Chest Pain unrelieved by NTG


Nitroglycerin (Nitrostat)  0.4 mg SL .Q5MIN PRN


   PRN Reason: Chest Pain


Ondansetron HCl (Zofran)  4 mg IV Q8H PRN


   PRN Reason: Nausea And Vomiting


Sodium Chloride (Sodium Chloride Flush Syringe 10 Ml)  10 ml IV BID [facility]


   Last Admin: 20 10:49 Dose:  10 ml


   Documented by: 


Sodium Chloride (Sodium Chloride Flush Syringe 10 Ml)  10 ml IV PRN PRN


   PRN Reason: LINE FLUSH











Review of Systems


Cardiovascular: shortness of breath, no chest pain, no orthopnea, no 

palpitations, no rapid/irregular heart beat, no edema, no syncope, no light

headedness





Physical Examination


                                   Vital Signs











Pulse Resp BP Pulse Ox


 


 116 H  20   119/74   94 


 


 20 21:51  20 21:51  20 21:51  20 21:51











General appearance: no acute distress


HEENT: Positive: PERRL


Neck: Positive: neck supple


Cardiac: Positive: irregularly irregular


Lungs: Positive: Decreased Breath Sounds


Neuro: Positive: Grossly Intact


Abdomen: Positive: Soft


Female genitourinary: deferred


Skin: Positive: Clear


Extremities: Absent: edema





Results





                                 20 03:25





                                 20 03:25


                                 Cardiac Enzymes











  20 Range/Units





  22:13 


 


AST  10  (5-40)  units/L








                                   Coagulation











  20 Range/Units





  03:25 


 


PT  15.3 H  (12.2-14.9)  Sec.


 


INR  1.20 H  (0.87-1.13)  


 


APTT  29.0  (24.2-36.6)  Sec.








                                     Lipids











  20 Range/Units





  00:12 


 


Triglycerides  68  (2-149)  mg/dL


 


Cholesterol  83  ()  mg/dL


 


HDL Cholesterol  49  (40-59)  mg/dL


 


Cholesterol/HDL Ratio  1.69  %








                                       CBC











  20 Range/Units





  22:13 00:00 03:25 


 


WBC  8.5  8.6  8.7  (4.5-11.0)  K/mm3


 


RBC  4.06  4.30  3.98  (3.65-5.03)  M/mm3


 


Hgb  13.7  14.2  13.5  (10.1-14.3)  gm/dl


 


Hct  43.2 H  45.8 H  42.4  (30.3-42.9)  %


 


Plt Count  153  170  155  (140-440)  K/mm3


 


Lymph #  1.4  1.3  1.4  (1.2-5.4)  K/mm3


 


Mono #  0.4  0.5  0.5  (0.0-0.8)  K/mm3


 


Eos #  0.1  0.0  0.0  (0.0-0.4)  K/mm3


 


Baso #  0.1  0.0  0.0  (0.0-0.1)  K/mm3








                          Comprehensive Metabolic Panel











  20 Range/Units





  22:13 00:00 03:25 


 


Sodium  130 L  131 L  133 L  (137-145)  mmol/L


 


Potassium  3.7  4.0  4.1  (3.6-5.0)  mmol/L


 


Chloride  85.0 L  85.3 L  86.1 L  ()  mmol/L


 


Carbon Dioxide  35 H  35 H  36 H  (22-30)  mmol/L


 


BUN  9  9  9  (7-17)  mg/dL


 


Creatinine  0.7  0.7  0.8  (0.7-1.2)  mg/dL


 


Glucose  112 H  108 H  116 H  ()  mg/dL


 


Calcium  8.7  8.8  8.7  (8.4-10.2)  mg/dL


 


AST  10    (5-40)  units/L


 


ALT  5 L    (7-56)  units/L


 


Alkaline Phosphatase  74    ()  units/L


 


Total Protein  7.1    (6.3-8.2)  g/dL


 


Albumin  3.4 L    (3.9-5)  g/dL














EKG interpretations





- Telemetry


EKG Rhythm: Atrial Flutter





Assessment and Plan





- Patient Problems


(1) Atrial flutter


Current Visit: Yes   Status: Acute   


Plan to address problem: 


Patient presents with COPD exacerbation, and EKG shows a slow atrial flutter 

with 2-1 AV conduction.  The chronicity of the atrial flutter is uncertain, but 

persists at the current time.





We will use Cardizem for rate control and may need amiodarone or sotalol for 

suppression of the atrial flutter.  Patient will ultimately need long-term oral 

anticoagulation.








(2) Cor pulmonale


Current Visit: Yes   Status: Acute   


Plan to address problem: 


Patient has severe pulmonary hypertension with pulmonary artery systolic 

pressure of 82, consistent with severe cor pulmonale.  We will defer further 

management of her pulmonary disease to her primary pulmonologist.








(3) Elevated troponin


Current Visit: Yes   Status: Acute   


Plan to address problem: 


Patient's troponin level was elevated, may be secondary to tachycardia, but isc

hemic cardiac work-up may be indicated.  We will review her outpatient records 

for prior ischemic cardiac evaluation and determine further ischemic work-up as 

indicated per

## 2020-05-17 NOTE — PROGRESS NOTE
Assessment and Plan


Assessment and plan: 


70-year-old female with known history of COPD tension and CHF presenting to the 

emergency room today complaining of lower extremity swelling, chest pain for the

past 24 to 48 hours.  Patient is on home oxygen for COPD and she is also on 

Lasix for CHF.  She however indicates that she has been out of her Lasix for 

some time now and she has not been able to get a refill from her primary care 

physician.


Chest pain is said to be left-sided and felt like pressure with no radiation.  

There is no known relieving or exacerbating factor.  She denies any nausea 

vomiting, no headache or dizziness.  She denies any fever or chills.  Patient 

denies any sick contacts and no recent travel.  She indicates she has been doing

self Quinatime for the past 3 months because of COVID-19.





Work-up in the emergency room however reveals elevated BNP, chemistry, pleural 

effusion with pulmonary vascular congestion on chest x-ray.


She had some Lasix in the emergency room with some relief.








Chest x-ray:There is enlargement of the cardiac silhouette. There is venous 

congestion and mild bilateral pulmonary edema. There is bilateral hilar 

prominence which appears similar to the prior study likely representing 

prominent pulmonary arteries.








Non-ST elevation MI 


acute on chronic congestive heart failure likely systolic


Chronic hypotension


Hyponatremia


Bilateral lower extremity edema


Anemia of chronic disease 


COPD








Plan


Continue supportive care.  NSTEMI protocol


Cardiology consulted


Monitor sodium closely considering diuretic therapy.


Doppler bilateral lower extremity to rule out PE DVTs


Pain control


Continue management of underlying COPD with oxygen therapy will likely need home

O2 evaluation prior to discharge


Weight loss strongly encouraged 15 minutes of time spent on counseling


DVT and GI prophylaxis plan of care discussed with patient








History


Interval history: 


Patient seen and examined, no acute distress, lying supine no distress.  Nursing

staff reported patient's low blood pressure patient reports that her blood 

pressure normally runs in the 80s systolic.  And that she is not on any blood 

pressure medication at home.








Hospitalist Physical





- Physical exam


Narrative exam: 


VITAL SIGNS:  Reviewed.    


GENERAL:  The patient appears normally developed, morbidly obese vital signs as 

documented.


HEAD:  No signs of head trauma.


EYES:  Pupils are equal.  Extraocular motions intact.  


EARS:  Hearing grossly intact.


MOUTH:  Oropharynx is normal. 


NECK:  No adenopathy, no JVD.  


CHEST:  Chest with crackles breath sounds bilaterally.  No wheezes, rales, or 

rhonchi.  


CARDIAC:  Regular rate and rhythm.  S1 and S2, without murmurs, gallops, or 

rubs.


VASCULAR: Bilateral +1 pitting edema.  Peripheral pulses normal and equal in all

extremities.


ABDOMEN:  Soft, non tender and non distended.  No   rebound or guarding, and no 

masses palpated.   Bowel Sounds normal.


MUSCULOSKELETAL:  Good range of motion of all major joints. Extremities without 

clubbing, cyanosis.  Bilateral +1 pitting edema.  


NEUROLOGIC EXAM:  Alert and oriented x 3   No focal sensory or strength 

deficits.   Speech normal.  Follows commands.


PSYCHIATRIC:  Mood normal.


SKIN:  detial exam as documented in skin assessment








- Constitutional


Vitals: 


                                        











Temp Pulse Resp BP Pulse Ox


 


 98.2 F   103 H  20   109/67   89 


 


 05/17/20 12:06  05/17/20 12:06  05/17/20 12:06  05/17/20 12:06  05/17/20 12:06











General appearance: Present: no acute distress, well-nourished





HEART Score





- HEART Score


EKG: Non-specific


Age: > 65


Risk factors: > 3 risk factors or hx of atherosclerotic disease


Troponin: 


                                        











Troponin T  0.126 ng/mL (0.00-0.029)  H*  05/17/20  05:29    











Troponin: < normal limit





- Critical Actions


Critical Actions: 4-6 pts:12-16.6% risk of adverse cardiac event. Should be 

admitted





Results





- Labs


CBC & Chem 7: 


                                 05/17/20 03:25





                                 05/17/20 03:25


Labs: 


                             Laboratory Last Values











WBC  8.7 K/mm3 (4.5-11.0)   05/17/20  03:25    


 


RBC  3.98 M/mm3 (3.65-5.03)   05/17/20  03:25    


 


Hgb  13.5 gm/dl (10.1-14.3)   05/17/20  03:25    


 


Hct  42.4 % (30.3-42.9)   05/17/20  03:25    


 


MCV  107 fl (79-97)  H  05/17/20  03:25    


 


MCH  34 pg (28-32)  H  05/17/20  03:25    


 


MCHC  32 % (30-34)   05/17/20  03:25    


 


RDW  20.1 % (13.2-15.2)  H  05/17/20  03:25    


 


Plt Count  155 K/mm3 (140-440)   05/17/20  03:25    


 


Lymph % (Auto)  16.4 % (13.4-35.0)   05/17/20  03:25    


 


Mono % (Auto)  5.8 % (0.0-7.3)   05/17/20  03:25    


 


Eos % (Auto)  0.3 % (0.0-4.3)   05/17/20  03:25    


 


Baso % (Auto)  0.5 % (0.0-1.8)   05/17/20  03:25    


 


Lymph #  1.4 K/mm3 (1.2-5.4)   05/17/20  03:25    


 


Mono #  0.5 K/mm3 (0.0-0.8)   05/17/20  03:25    


 


Eos #  0.0 K/mm3 (0.0-0.4)   05/17/20  03:25    


 


Baso #  0.0 K/mm3 (0.0-0.1)   05/17/20  03:25    


 


Seg Neutrophils %  77.0 % (40.0-70.0)  H  05/17/20  03:25    


 


Seg Neutrophils #  6.7 K/mm3 (1.8-7.7)   05/17/20  03:25    


 


PT  15.3 Sec. (12.2-14.9)  H  05/17/20  03:25    


 


INR  1.20  (0.87-1.13)  H  05/17/20  03:25    


 


APTT  29.0 Sec. (24.2-36.6)   05/17/20  03:25    


 


Sodium  133 mmol/L (137-145)  L  05/17/20  03:25    


 


Potassium  4.1 mmol/L (3.6-5.0)   05/17/20  03:25    


 


Chloride  86.1 mmol/L ()  L  05/17/20  03:25    


 


Carbon Dioxide  36 mmol/L (22-30)  H  05/17/20  03:25    


 


Anion Gap  15 mmol/L  05/17/20  03:25    


 


BUN  9 mg/dL (7-17)   05/17/20  03:25    


 


Creatinine  0.8 mg/dL (0.7-1.2)   05/17/20  03:25    


 


Estimated GFR  > 60 ml/min  05/17/20  03:25    


 


BUN/Creatinine Ratio  11 %  05/17/20  03:25    


 


Glucose  116 mg/dL ()  H  05/17/20  03:25    


 


Calcium  8.7 mg/dL (8.4-10.2)   05/17/20  03:25    


 


Total Bilirubin  0.70 mg/dL (0.1-1.2)   05/16/20  22:13    


 


AST  10 units/L (5-40)   05/16/20  22:13    


 


ALT  5 units/L (7-56)  L  05/16/20  22:13    


 


Alkaline Phosphatase  74 units/L ()   05/16/20  22:13    


 


Troponin T  0.126 ng/mL (0.00-0.029)  H*  05/17/20  05:29    


 


NT-Pro-B Natriuret Pep  3719 pg/mL (0-900)  H  05/16/20  22:13    


 


Total Protein  7.1 g/dL (6.3-8.2)   05/16/20  22:13    


 


Albumin  3.4 g/dL (3.9-5)  L  05/16/20  22:13    


 


Albumin/Globulin Ratio  0.9 %  05/16/20  22:13    


 


Triglycerides  68 mg/dL (2-149)   05/17/20  00:12    


 


Cholesterol  83 mg/dL ()   05/17/20  00:12    


 


LDL Cholesterol Direct  33 mg/dL ()  L  05/17/20  00:12    


 


HDL Cholesterol  49 mg/dL (40-59)   05/17/20  00:12    


 


Cholesterol/HDL Ratio  1.69 %  05/17/20  00:12    











Min/IV: 


                                        





Voiding Method                   Toilet


IV Catheter Type [Left Forearm   INT / Saline Lock


]                                











Active Medications





- Current Medications


Current Medications: 














Generic Name Dose Route Start Last Admin





  Trade Name Freq  PRN Reason Stop Dose Admin


 


Acetaminophen  650 mg  05/16/20 23:45 





  Tylenol  PO  





  Q4H PRN  





  Pain MILD(1-3)/Fever >100.5/HA  


 


Aspirin  325 mg  05/17/20 10:00  05/17/20 10:48





  Ecotrin  PO   325 mg





  QDAY AMANDA   Administration


 


Atorvastatin Calcium  40 mg  05/17/20 22:00 





  Lipitor  PO  





  QHS AMANDA  


 


Furosemide  40 mg  05/17/20 06:00  05/17/20 05:05





  Lasix  IV   40 mg





  BID@0600,1800 AMANDA   Administration


 


Heparin Sodium/Sodium Chloride  25,000 unit in 500 mls @ 20 mls/hr  05/17/20 

06:00  05/17/20 06:21





  Heparin/ 0.45% Nacl-25,000 Unit/500 Ml  IV   1,000 units/hr





  TITRATE AMANDA   20 mls/hr





    Administration





  Protocol  





  1,000 UNITS/HR  


 


Magnesium Hydroxide  30 ml  05/16/20 23:45 





  Milk Of Magnesia  PO  





  Q4H PRN  





  Constipation  


 


Morphine Sulfate  2 mg  05/16/20 23:45 





  Morphine  IV  





  Q5MIN PRN  





  Chest Pain unrelieved by NTG  


 


Nitroglycerin  0.4 mg  05/16/20 23:45 





  Nitrostat  SL  





  .Q5MIN PRN  





  Chest Pain  


 


Ondansetron HCl  4 mg  05/16/20 23:45 





  Zofran  IV  





  Q8H PRN  





  Nausea And Vomiting  


 


Sodium Chloride  10 ml  05/17/20 10:00  05/17/20 10:49





  Sodium Chloride Flush Syringe 10 Ml  IV   10 ml





  BID AMANDA   Administration


 


Sodium Chloride  10 ml  05/16/20 23:45 





  Sodium Chloride Flush Syringe 10 Ml  IV  





  PRN PRN  





  LINE FLUSH

## 2020-05-17 NOTE — HISTORY AND PHYSICAL REPORT
History of Present Illness


Date of examination: 20


Date of admission: 


20 23:26





Chief complaint: 





chest Pain


History of present illness: 





70-year-old female with known history of COPD tension and CHF presenting to the 

emergency room today complaining of lower extremity swelling, chest pain for the

past 24 to 48 hours.  Patient is on home oxygen for COPD and she is also on 

Lasix for CHF.  She however indicates that she has been out of her Lasix for 

some time now and she has not been able to get a refill from her primary care 

physician.


Chest pain is said to be left-sided and felt like pressure with no radiation.  

There is no known relieving or exacerbating factor.  She denies any nausea 

vomiting, no headache or dizziness.  She denies any fever or chills.  Patient 

denies any sick contacts and no recent travel.  She indicates she has been doing

self Quinatime for the past 3 months because of COVID-19.





Work-up in the emergency room however reveals elevated BNP, chemistry, pleural 

effusion with pulmonary vascular congestion on chest x-ray.


She had some Lasix in the emergency room with some relief.





Past History


Past Medical History: COPD, heart failure, hypertension


Past Surgical History: , Other (Tubal ligation)


Social history: smoking (Smokes about half a pack of cigarette daily)


Family history: no significant family history





Medications and Allergies


                                    Allergies











Allergy/AdvReac Type Severity Reaction Status Date / Time


 


No Known Allergies Allergy   Unverified 18 11:23











                                Home Medications











 Medication  Instructions  Recorded  Confirmed  Last Taken  Type


 


No Known Home Medications [No  20 Unknown History





Reported Home Medications]     











Active Meds: 


Active Medications





Acetaminophen (Tylenol)  650 mg PO Q4H PRN


   PRN Reason: Pain MILD(1-3)/Fever >100.5/HA


Aspirin (Ecotrin)  325 mg PO QDAY AMANDA


Furosemide (Lasix)  40 mg IV BID@0600,1800 AMANDA


Magnesium Hydroxide (Milk Of Magnesia)  30 ml PO Q4H PRN


   PRN Reason: Constipation


Morphine Sulfate (Morphine)  2 mg IV Q5MIN PRN


   PRN Reason: Chest Pain unrelieved by NTG


Nitroglycerin (Nitrostat)  0.4 mg SL .Q5MIN PRN


   PRN Reason: Chest Pain


Ondansetron HCl (Zofran)  4 mg IV Q8H PRN


   PRN Reason: Nausea And Vomiting


Sodium Chloride (Sodium Chloride Flush Syringe 10 Ml)  10 ml IV BID AMANDA


Sodium Chloride (Sodium Chloride Flush Syringe 10 Ml)  10 ml IV PRN PRN


   PRN Reason: LINE FLUSH











Review of Systems


Constitutional: no fever, no chills


Cardiovascular: chest pain, orthopnea, no palpitations


Respiratory: shortness of breath, no cough


Gastrointestinal: no nausea, no vomiting, no diarrhea


Genitourinary Female: no dysuria, no hematuria


Musculoskeletal: no neck pain, no low back pain


Integumentary: no rash, no pruritis


Neurological: no headaches, no change in mentation


Psychiatric: no anxiety, no disorientation





Exam





- Constitutional


Vitals: 


                                        











Temp Pulse Resp BP Pulse Ox


 


    108 H  25 H  126/78   90 


 


    20 22:47  20 22:47  20 22:47  20 22:47











General appearance: Present: no acute distress, well-nourished





- EENT


Eyes: Present: PERRL, irregular pupil


ENT: hearing intact, clear oral mucosa, dentition normal





- Neck


Neck: Present: supple, normal ROM





- Respiratory


Respiratory effort: normal


Respiratory: bilateral: rales





- Cardiovascular


Rhythm: regular


Heart Sounds: Present: S1 & S2





- Extremities


Extremities: no ischemia, pulses intact, pulses symmetrical, No edema (2+ 

bilateral lower extremity edema), Full ROM


Peripheral Pulses: within normal limits





- Abdominal


General gastrointestinal: Present: soft, non-tender, non-distended





- Integumentary


Integumentary: Present: clear, warm, dry





- Musculoskeletal


Musculoskeletal: strength equal bilaterally





- Psychiatric


Psychiatric: appropriate mood/affect, intact judgment & insight, cooperative





- Neurologic


Neurologic: CNII-XII intact, moves all extremities





HEART Score





- HEART Score


EKG: Non-specific


Age: > 65


Risk factors: > 3 risk factors or hx of atherosclerotic disease


Troponin: 


                                        











Troponin T  < 0.010 ng/mL (0.00-0.029)   20  22:13    











Troponin: < normal limit





- Critical Actions


Critical Actions: 4-6 pts:12-16.6% risk of adverse cardiac event. Should be 

admitted





Results





- Labs


CBC & Chem 7: 


                                 20 03:25





                                 20 03:25


Labs: 


                              Abnormal lab results











  20 Range/Units





  22:13 22:13 


 


Hct  43.2 H   (30.3-42.9)  %


 


MCV  107 H   (79-97)  fl


 


MCH  34 H   (28-32)  pg


 


RDW  20.2 H   (13.2-15.2)  %


 


Seg Neutrophils %  77.3 H   (40.0-70.0)  %


 


Sodium   130 L  (137-145)  mmol/L


 


Chloride   85.0 L  ()  mmol/L


 


Carbon Dioxide   35 H  (22-30)  mmol/L


 


Glucose   112 H  ()  mg/dL


 


ALT   5 L  (7-56)  units/L


 


NT-Pro-B Natriuret Pep   3719 H  (0-900)  pg/mL


 


Albumin   3.4 L  (3.9-5)  g/dL














Assessment and Plan





- Patient Problems


(1) CHF exacerbation


Current Visit: Yes   Status: Acute   


Plan to address problem: 


Patient placed on diuretics.  Will monitor inputs and outputs and also monitor 

daily weights.


Patient will be scheduled for echocardiogram.








(2) Chest pain


Current Visit: Yes   Status: Acute   


Plan to address problem: 


We will check serial cardiac enzymes.  Patient will be placed on sublingual 

nitroglycerin and IV morphine as needed for chest pain.  Will also be placed on 

daily aspirin.








(3) COPD (chronic obstructive pulmonary disease)


Current Visit: No   Status: Chronic   


Qualifiers: 


   COPD type: unspecified COPD   Qualified Code(s): J44.9 - Chronic obstructive 

pulmonary disease, unspecified   


Plan to address problem: 


We will continue patient on her routine home medications.  We also keep O2 

saturation greater or equal to 92%.


Will place on nebulizing treatments if needed.








(4) DVT prophylaxis


Current Visit: No   Status: Acute   


Plan to address problem: 


Patient to be placed on subcutaneous heparin.








(5) Full code status


Current Visit: Yes   Status: Acute

## 2020-05-18 LAB
BUN SERPL-MCNC: 14 MG/DL (ref 7–17)
BUN/CREAT SERPL: 11 %
CALCIUM SERPL-MCNC: 8.3 MG/DL (ref 8.4–10.2)
HCT VFR BLD CALC: 40.9 % (ref 30.3–42.9)
HEMOLYSIS INDEX: 9
HGB BLD-MCNC: 12.9 GM/DL (ref 10.1–14.3)
MCHC RBC AUTO-ENTMCNC: 32 % (ref 30–34)
MCV RBC AUTO: 107 FL (ref 79–97)
PLATELET # BLD: 136 K/MM3 (ref 140–440)
RBC # BLD AUTO: 3.81 M/MM3 (ref 3.65–5.03)

## 2020-05-18 RX ADMIN — Medication SCH ML: at 10:02

## 2020-05-18 RX ADMIN — ARFORMOTEROL TARTRATE SCH: 15 SOLUTION RESPIRATORY (INHALATION) at 21:32

## 2020-05-18 RX ADMIN — IPRATROPIUM BROMIDE AND ALBUTEROL SULFATE SCH: .5; 3 SOLUTION RESPIRATORY (INHALATION) at 07:44

## 2020-05-18 RX ADMIN — BUDESONIDE SCH MG: 0.5 INHALANT RESPIRATORY (INHALATION) at 21:31

## 2020-05-18 RX ADMIN — BUDESONIDE SCH MG: 0.5 INHALANT RESPIRATORY (INHALATION) at 07:44

## 2020-05-18 RX ADMIN — IPRATROPIUM BROMIDE AND ALBUTEROL SULFATE SCH: .5; 3 SOLUTION RESPIRATORY (INHALATION) at 14:47

## 2020-05-18 RX ADMIN — Medication SCH ML: at 22:08

## 2020-05-18 RX ADMIN — FUROSEMIDE SCH MG: 10 INJECTION, SOLUTION INTRAVENOUS at 18:39

## 2020-05-18 RX ADMIN — IPRATROPIUM BROMIDE AND ALBUTEROL SULFATE SCH AMPUL: .5; 3 SOLUTION RESPIRATORY (INHALATION) at 21:31

## 2020-05-18 RX ADMIN — HEPARIN SODIUM SCH MLS/HR: 5000 INJECTION, SOLUTION INTRAVENOUS at 18:40

## 2020-05-18 RX ADMIN — ARFORMOTEROL TARTRATE SCH MCG: 15 SOLUTION RESPIRATORY (INHALATION) at 07:44

## 2020-05-18 RX ADMIN — FUROSEMIDE SCH MG: 10 INJECTION, SOLUTION INTRAVENOUS at 05:45

## 2020-05-18 RX ADMIN — HEPARIN SODIUM SCH MLS/HR: 5000 INJECTION, SOLUTION INTRAVENOUS at 05:46

## 2020-05-18 RX ADMIN — ASPIRIN SCH MG: 325 TABLET, COATED ORAL at 10:02

## 2020-05-18 NOTE — PROGRESS NOTE
Assessment and Plan


Assessment and plan: 


70-year-old female with known history of COPD tension and CHF presenting to the 

emergency room today complaining of lower extremity swelling, chest pain for the

past 24 to 48 hours.  Patient is on home oxygen for COPD and she is also on 

Lasix for CHF.  She however indicates that she has been out of her Lasix for 

some time now and she has not been able to get a refill from her primary care 

physician.


Chest pain is said to be left-sided and felt like pressure with no radiation.  

There is no known relieving or exacerbating factor.  She denies any nausea 

vomiting, no headache or dizziness.  She denies any fever or chills.  Patient 

denies any sick contacts and no recent travel.  She indicates she has been doing

self Quinatime for the past 3 months because of COVID-19.





Work-up in the emergency room however reveals elevated BNP, chemistry, pleural 

effusion with pulmonary vascular congestion on chest x-ray.


She had some Lasix in the emergency room with some relief.








Chest x-ray: There is enlargement of the cardiac silhouette. There is venous 

congestion and mild bilateral pulmonary edema. There is bilateral hilar 

prominence which appears similar to the prior study likely representing 

prominent pulmonary arteries.





5/18: Now on High flow and also noted to have elevated creatinine, will obtain 

Pulmonary evaluation in addition to current management. Monitor Hyponatremia. 

Continue cardiology management with rate control meds and possible ischemia work

up.  Also obtain a nephrology consultation due to rising creatinine.  Will defer

to cardiology for management of Lasix.





Non-ST elevation MI 


acute on chronic congestive heart failure likely systolic


Acute Respiratory distress WITH HYPOXIA


Acute kidney injury secondary to vasomotor nephropathy


Pulmonary hypotension


Chronic hypotension


Hyponatremia


Bilateral lower extremity edema


Anemia of chronic disease 


COPD








Plan


Continue supportive care. NSTEMI protocol


Cardiology consulted


Monitor sodium closely considering diuretic therapy.


Doppler bilateral lower extremity to rule out PE DVTs


Pain control


Continue management of underlying COPD with oxygen therapy will likely need home

O2 evaluation prior to discharge


Weight loss strongly encouraged 15 minutes of time spent on counseling


DVT and GI prophylaxis plan of care discussed with patient








History


Interval history: 


Patient seen and examined, noted increased Respiratory distress today requiring 

High flow





Hospitalist Physical





- Physical exam


Narrative exam: 


VITAL SIGNS:  Reviewed.    


GENERAL:  The patient appears normally developed, mild distress respiratory wise

morbidly obese vital signs as documented.


HEAD:  No signs of head trauma.


EYES:  Pupils are equal.  Extraocular motions intact.  


EARS:  Hearing grossly intact.


MOUTH:  Oropharynx is normal. 


NECK:  No adenopathy, no JVD.  


CHEST:  Chest with crackles breath sounds bilaterally.  No wheezes, rales, or 

rhonchi.  


CARDIAC:  irregular irregular.  S1 and S2, without murmurs, gallops, or rubs.


VASCULAR: Bilateral +1 pitting edema.  Peripheral pulses normal and equal in all

extremities.


ABDOMEN:  Soft, non tender and non distended.  No   rebound or guarding, and no 

masses palpated.   Bowel Sounds normal.


MUSCULOSKELETAL:  Good range of motion of all major joints. Extremities without 

clubbing, cyanosis.  Bilateral +1 pitting edema.  


NEUROLOGIC EXAM:  Alert and oriented x 3   No focal sensory or strength 

deficits.   Speech normal.  Follows commands.


PSYCHIATRIC:  Mood normal.


SKIN:  detial exam as documented in skin assessment








- Constitutional


Vitals: 


                                        











Temp Pulse Resp BP Pulse Ox


 


 98.0 F   104 H  18   100/67   91 


 


 05/18/20 11:57  05/18/20 14:38  05/18/20 11:57  05/18/20 14:38  05/18/20 14:48











General appearance: Present: no acute distress





HEART Score





- HEART Score


EKG: Non-specific


Age: > 65


Risk factors: > 3 risk factors or hx of atherosclerotic disease


Troponin: 


                                        











Troponin T  0.126 ng/mL (0.00-0.029)  H*  05/17/20  05:29    











Troponin: < normal limit





- Critical Actions


Critical Actions: 4-6 pts:12-16.6% risk of adverse cardiac event. Should be 

admitted





Results





- Labs


CBC & Chem 7: 


                                 05/18/20 04:45





                                 05/18/20 04:45


Labs: 


                             Laboratory Last Values











WBC  8.3 K/mm3 (4.5-11.0)   05/18/20  04:45    


 


RBC  3.81 M/mm3 (3.65-5.03)   05/18/20  04:45    


 


Hgb  12.9 gm/dl (10.1-14.3)   05/18/20  04:45    


 


Hct  40.9 % (30.3-42.9)   05/18/20  04:45    


 


MCV  107 fl (79-97)  H  05/18/20  04:45    


 


MCH  34 pg (28-32)  H  05/18/20  04:45    


 


MCHC  32 % (30-34)   05/18/20  04:45    


 


RDW  20.1 % (13.2-15.2)  H  05/18/20  04:45    


 


Plt Count  136 K/mm3 (140-440)  L  05/18/20  04:45    


 


Lymph % (Auto)  16.4 % (13.4-35.0)   05/17/20  03:25    


 


Mono % (Auto)  5.8 % (0.0-7.3)   05/17/20  03:25    


 


Eos % (Auto)  0.3 % (0.0-4.3)   05/17/20  03:25    


 


Baso % (Auto)  0.5 % (0.0-1.8)   05/17/20  03:25    


 


Lymph #  1.4 K/mm3 (1.2-5.4)   05/17/20  03:25    


 


Mono #  0.5 K/mm3 (0.0-0.8)   05/17/20  03:25    


 


Eos #  0.0 K/mm3 (0.0-0.4)   05/17/20  03:25    


 


Baso #  0.0 K/mm3 (0.0-0.1)   05/17/20  03:25    


 


Seg Neutrophils %  77.0 % (40.0-70.0)  H  05/17/20  03:25    


 


Seg Neutrophils #  6.7 K/mm3 (1.8-7.7)   05/17/20  03:25    


 


PT  15.3 Sec. (12.2-14.9)  H  05/17/20  03:25    


 


INR  1.20  (0.87-1.13)  H  05/17/20  03:25    


 


APTT  29.0 Sec. (24.2-36.6)   05/17/20  03:25    


 


Heparin Anti-Xa Level  0.14 U.I./ml (0.3-0.7)  L  05/17/20  13:51    


 


Sodium  127 mmol/L (137-145)  L  05/18/20  04:45    


 


Potassium  5.1 mmol/L (3.6-5.0)  H D 05/18/20  04:45    


 


Chloride  83.9 mmol/L ()  L  05/18/20  04:45    


 


Carbon Dioxide  32 mmol/L (22-30)  H  05/18/20  04:45    


 


Anion Gap  16 mmol/L  05/18/20  04:45    


 


BUN  14 mg/dL (7-17)   05/18/20  04:45    


 


Creatinine  1.3 mg/dL (0.7-1.2)  H D 05/18/20  04:45    


 


Estimated GFR  49 ml/min  05/18/20  04:45    


 


BUN/Creatinine Ratio  11 %  05/18/20  04:45    


 


Glucose  116 mg/dL ()  H  05/18/20  04:45    


 


Calcium  8.3 mg/dL (8.4-10.2)  L  05/18/20  04:45    


 


Total Bilirubin  0.70 mg/dL (0.1-1.2)   05/16/20  22:13    


 


AST  10 units/L (5-40)   05/16/20  22:13    


 


ALT  5 units/L (7-56)  L  05/16/20  22:13    


 


Alkaline Phosphatase  74 units/L ()   05/16/20  22:13    


 


Troponin T  0.126 ng/mL (0.00-0.029)  H*  05/17/20  05:29    


 


NT-Pro-B Natriuret Pep  3719 pg/mL (0-900)  H  05/16/20  22:13    


 


Total Protein  7.1 g/dL (6.3-8.2)   05/16/20  22:13    


 


Albumin  3.4 g/dL (3.9-5)  L  05/16/20  22:13    


 


Albumin/Globulin Ratio  0.9 %  05/16/20  22:13    


 


Triglycerides  68 mg/dL (2-149)   05/17/20  00:12    


 


Cholesterol  83 mg/dL ()   05/17/20  00:12    


 


LDL Cholesterol Direct  33 mg/dL ()  L  05/17/20  00:12    


 


HDL Cholesterol  49 mg/dL (40-59)   05/17/20  00:12    


 


Cholesterol/HDL Ratio  1.69 %  05/17/20  00:12    














- Diagnostic Impressions


Diagnostic Impressions: 








Echocardiogram  05/16/20 23:50


Transthoracic Echocardiogram


 


Indication:


CHF


BP:           84/59


HR:           117


 


Conclusions


 *4-chamber dilated cardiomyopathy.


 *The right heart chambers are both severely dilated. There is


moderately severe tricuspid regurgitation, and severe pulmonary HTN,


with PASP of 82mmHg.


 *Left heart chambers are moderately dilated.


 *Global left ventricular systolic function is severely decreased.


 *The estimated ejection fraction is 20-25%. 


 *There is mild mitral regurgitation. 


 


Findings     


Left Ventricle:


The left ventricular chamber size is moderately dilated. There is no


left ventricular hypertrophy. Severe global hypokinesis of the left


ventricle is observed. Global left ventricular systolic function is


severely decreased. The estimated ejection fraction is 20-25%.  Abnormal


left ventricular diastolic function is observed. 


 


Left Atrium:


The left atrium is moderate to severely dilated.  


 


Right Ventricle:


The right ventricle is severely dilated.  The right ventricular global


systolic function is severely reduced. 


 


Right Atrium:


The right atrial cavity size is severely dilated. 


 


Aortic Valve:


The aortic valve leaflets are moderately thickened. There is trace of


aortic regurgitation. There is no evidence of aortic stenosis. 


 


Mitral Valve:


The mitral valve leaflets are moderately thickened. There is mild mitral


regurgitation.  There is no evidence of mitral stenosis. 


 


Tricuspid Valve:


The tricuspid valve leaflets are normal.  There is moderate to severe


tricuspid regurgitation. The right ventricular systolic pressure is


calculated at 82 mmHg.  There is evidence of severe pulmonary


hypertension. There is no tricuspid stenosis. 


 


Pulmonic Valve:


The pulmonic valve appears normal. There is mild pulmonic regurgitation.


 


 


Pericardium:


A trivial pericardial effusion is visualized. 


 


Aorta:


There is no dilatation of the ascending aorta. There is no dilatation of


the aortic root. 


 


Venous:


The inferior vena cava is dilated.  There is less than 50% respiratory


change in the inferior vena cava dimension. 


 


Measurements     


Chambers 2D


Name                    Value         Normal Range            


IVSd (2D)               1.56 cm       (0.6 - 1.1)             


LVPWd (2D)              1.56 cm       (0.6 - 1.1)             


LVIDd (2D)              5.05 cm       (3.7 - 5.6)             


LVIDs (2D)              4.47 cm       (2 - 3.8)               


LV FS (2D)              11.64 %       -                        


EF Teichholz (2D)       25.1 %        -                        


Ao root diameter (2D)   3.06 cm       (2 - 3.7)               


 


Volumes/Mass


Name                    Value         Normal Range            


LA ESV SP 4CH (A/L)     76.11 ml      -                        


LA ESV SP 2CH (A/L)     48.94 ml      -                        


LA ESV BP (A/L)         62.38 ml      -                        


LA ESV SP 4CH (MOD)     70.31 ml      -                        


LA ESV SP 2CH (MOD)     46.04 ml      -                        


LA ESV BP (MOD)         58.03 ml      -                        


LA ESV BP (MOD) index   28.87 ml/m2   -                        


LV EDV SP 4CH (MOD)     81.51 ml      -                        


LV ESV SP 4CH (MOD)     46.31 ml      -                        


EF SP 4CH (MOD)         43.18 %       -                        


LV EDV SP 2CH (MOD)     84.04 ml      -                        


LV ESV SP 2CH (MOD)     60.72 ml      -                        


EF SP 2CH (MOD)         27.76 %       -                        


LV EDV BP               84.52 ml      -                        


LV ESV BP               53.64 ml      -                        


BP EF (MOD)             36.54 %       -                        


 


Aortic Valve


Name                    Value         Normal Range            


LVOT diameter           2.01 cm       -                        


LVOT Vmax               0.64 m/sec    -                        


LVOT VTI                8.01 cm       -                        


LVOT peak gradient      1.62 mmHg     -                        


LVOT mean gradient      0.79 mmHg     -                        


SV LVOT                 25.42 ml      -                        


Ascending Ao            2.81 cm       -                        


 


Tricuspid Valve


Name                    Value         Normal Range            


TR Vmax                 4.09 m/sec    -                        


TR peak gradient        66.78 mmHg    -                        


RAP                     15 mmHg       -                        


RVSP                    82 mmHg       -                        


 


Pulmonic Valve/Qp:Qs


Name                    Value         Normal Range            


PV Vmax                 0.87 m/sec    -                        


PV peak gradient        3.06 mmHg     -                        


UT end-diastolic Vmax   2.66 m/sec    -                        


RVOT Vmax               0.7 m/sec     -                        


RVOT peak gradient      1.96 mmHg     -                        


PV acceleration time    53.28 msec    -                        


 


Electronically signed by: Shade Vela MD on 05/17/2020 15:34:04











Min/IV: 


                                        





Voiding Method                   Bedside Commode


IV Catheter Type [Left Forearm   INT / Saline Lock


]                                











Active Medications





- Current Medications


Current Medications: 














Generic Name Dose Route Start Last Admin





  Trade Name Freq  PRN Reason Stop Dose Admin


 


Acetaminophen  650 mg  05/16/20 23:45 





  Tylenol  PO  





  Q4H PRN  





  Pain MILD(1-3)/Fever >100.5/HA  


 


Albuterol  2.5 mg  05/17/20 14:57 





  Proventil  IH  





  Q4HRT PRN  





  Shortness Of Breath  


 


Albuterol/Ipratropium  1 ampul  05/17/20 20:00  05/18/20 14:47





  Duoneb *Not For Prn Use*  IH   Not Given





  TIDRT AMANDA  


 


Arformoterol Tartrate  15 mcg  05/17/20 20:00  05/18/20 07:44





  Brovana Nebu  IH   15 mcg





  Q12HRT AMANDA   Administration


 


Aspirin  325 mg  05/17/20 10:00  05/18/20 10:02





  Ecotrin  PO   325 mg





  QDAY AMANDA   Administration


 


Atorvastatin Calcium  40 mg  05/17/20 22:00  05/17/20 21:40





  Lipitor  PO   40 mg





  QHS AMANDA   Administration


 


Budesonide  0.5 mg  05/17/20 20:00  05/18/20 07:44





  Pulmicort  IH   0.5 mg





  Q12HRT AMANDA   Administration


 


Diltiazem HCl  30 mg  05/17/20 18:00  05/18/20 14:38





  Cardizem  PO   30 mg





  Q6HR AMANDA   Administration


 


Furosemide  40 mg  05/17/20 06:00  05/18/20 05:45





  Lasix  IV   40 mg





  BID@0600,1800 [facility]   Administration


 


Heparin Sodium/Sodium Chloride  25,000 unit in 500 mls @ 20 mls/hr  05/17/20 

06:00  05/18/20 05:46





  Heparin/ 0.45% Nacl-25,000 Unit/500 Ml  IV   1,000 units/hr





  TITRATE AMANDA   20 mls/hr





    Administration





  Protocol  





  1,000 UNITS/HR  


 


Magnesium Hydroxide  30 ml  05/16/20 23:45 





  Milk Of Magnesia  PO  





  Q4H PRN  





  Constipation  


 


Morphine Sulfate  2 mg  05/16/20 23:45 





  Morphine  IV  





  Q5MIN PRN  





  Chest Pain unrelieved by NTG  


 


Nitroglycerin  0.4 mg  05/16/20 23:45 





  Nitrostat  SL  





  .Q5MIN PRN  





  Chest Pain  


 


Ondansetron HCl  4 mg  05/16/20 23:45 





  Zofran  IV  





  Q8H PRN  





  Nausea And Vomiting  


 


Sodium Chloride  10 ml  05/17/20 10:00  05/18/20 10:02





  Sodium Chloride Flush Syringe 10 Ml  IV   10 ml





  BID AMANDA   Administration


 


Sodium Chloride  10 ml  05/16/20 23:45 





  Sodium Chloride Flush Syringe 10 Ml  IV  





  PRN PRN  





  LINE FLUSH  


 


Sotalol HCl  80 mg  05/17/20 22:00  05/18/20 10:01





  Betapace  PO   80 mg





  Q12HR AMANDA   Administration

## 2020-05-18 NOTE — PROGRESS NOTE
Assessment and Plan





Elevated troponin


Atrial flutter with variable conduction rate, the chronicity is uncertain 


   on cardizem and sotalol 


Dilated cardiomyopathy, uncertain duration


   LVEF 20-25%


Cor-pulmonale


   severe pulmonary hypertension with pulmonary artery systolic pressure of 82, 

consistent with severe cor pulmonale.


COPD exacerbation


   on home oxygen


Hyponatremia





Ischemic cardiac work-up depends on clinical course.  


Continue cardizem and sotalol for suppression of the atrial flutter. Patient 

will ultimately need long-term oral anticoagulation.








Subjective


Date of service: 05/18/20


Interval history: 





Patient is resting in bed comfortably.


Aflutter with a well controlled ventricular rate on telemetry.





Objective


                                   Vital Signs











  Temp Pulse Pulse Resp Resp BP BP


 


 05/18/20 10:01   90     145/90 


 


 05/18/20 07:45    89   20  


 


 05/18/20 07:44       


 


 05/18/20 07:32   99 H     


 


 05/18/20 05:45   70     105/79 


 


 05/18/20 03:42  97.0 F L  68   20   105/79 


 


 05/18/20 03:20       


 


 05/18/20 02:50   85     


 


 05/18/20 01:13   103 H     144/122 


 


 05/18/20 00:02   101 H     


 


 05/17/20 23:23  97.5 F L    19   144/122 


 


 05/17/20 22:00       


 


 05/17/20 21:41   86     163/131 


 


 05/17/20 20:39       


 


 05/17/20 20:38    79   22  


 


 05/17/20 19:47  97.7 F  101 H   18   95/68 


 


 05/17/20 19:30   112 H     


 


 05/17/20 18:18   108 H     90/64 


 


 05/17/20 18:08   108 H   20    90/64


 


 05/17/20 16:21  98.6 F  95 H   20   149/103 


 


 05/17/20 14:58       


 


 05/17/20 14:55       


 


 05/17/20 12:06  98.2 F  103 H   20   109/67 














  Pulse Ox


 


 05/18/20 10:01 


 


 05/18/20 07:45 


 


 05/18/20 07:44  92


 


 05/18/20 07:32  85


 


 05/18/20 05:45 


 


 05/18/20 03:42  85


 


 05/18/20 03:20  93


 


 05/18/20 02:50 


 


 05/18/20 01:13 


 


 05/18/20 00:02  90


 


 05/17/20 23:23 


 


 05/17/20 22:00  94


 


 05/17/20 21:41 


 


 05/17/20 20:39  90


 


 05/17/20 20:38 


 


 05/17/20 19:47  89


 


 05/17/20 19:30 


 


 05/17/20 18:18 


 


 05/17/20 18:08  94


 


 05/17/20 16:21  90


 


 05/17/20 14:58  92


 


 05/17/20 14:55  91


 


 05/17/20 12:06  89














- Physical Examination


General: No Apparent Distress


HEENT: Positive: PERRL


Neck: Positive: neck supple


Cardiac: Positive: irregularly irregular


Neuro: Positive: Grossly Intact


Extremities: Absent: edema





- Labs and Meds


                                       CBC











  05/18/20 Range/Units





  04:45 


 


WBC  8.3  (4.5-11.0)  K/mm3


 


RBC  3.81  (3.65-5.03)  M/mm3


 


Hgb  12.9  (10.1-14.3)  gm/dl


 


Hct  40.9  (30.3-42.9)  %


 


Plt Count  136 L  (140-440)  K/mm3








                          Comprehensive Metabolic Panel











  05/18/20 Range/Units





  04:45 


 


Sodium  127 L  (137-145)  mmol/L


 


Potassium  5.1 H D  (3.6-5.0)  mmol/L


 


Chloride  83.9 L  ()  mmol/L


 


Carbon Dioxide  32 H  (22-30)  mmol/L


 


BUN  14  (7-17)  mg/dL


 


Creatinine  1.3 H D  (0.7-1.2)  mg/dL


 


Glucose  116 H  ()  mg/dL


 


Calcium  8.3 L  (8.4-10.2)  mg/dL

## 2020-05-18 NOTE — VASCULAR LAB REPORT
DUPLEX DOPPLER LOWER EXTREMITY VEINS, BILATERAL



INDICATION:

DVT.



TECHNIQUE:

Duplex doppler imaging was performed through the veins of both lower extremities using venous brittany
john and other maneuvers.



COMPARISON: 

No relevant prior imaging study available.



FINDINGS:

Right Common femoral vein: Negative.

Right Superficial femoral vein: Negative.

Right Popliteal vein: Negative.

Right Calf veins: Negative.



Left Common femoral vein: Negative.

Left Superficial femoral vein: Negative.

Left Popliteal vein: Negative.

Left Calf veins: Negative.



Additional findings: None..



IMPRESSION:

1. No sonographic evidence for DVT in either lower extremity.



Signer Name: Adrian Ross MD 

Signed: 5/18/2020 4:29 PM

Workstation Name: [id number]

## 2020-05-19 LAB
BACTERIA #/AREA URNS HPF: (no result) /HPF
BILIRUB UR QL STRIP: (no result)
BLOOD UR QL VISUAL: (no result)
BUN SERPL-MCNC: 25 MG/DL (ref 7–17)
BUN SERPL-MCNC: 31 MG/DL (ref 7–17)
BUN/CREAT SERPL: 13 %
BUN/CREAT SERPL: 15 %
CALCIUM SERPL-MCNC: 8 MG/DL (ref 8.4–10.2)
CALCIUM SERPL-MCNC: 8.2 MG/DL (ref 8.4–10.2)
CREATININE,URINE: 216.1 MG/DL (ref 0.1–20)
HCT VFR BLD CALC: 42.9 % (ref 30.3–42.9)
HEMOLYSIS INDEX: 11
HEMOLYSIS INDEX: 98
HGB BLD-MCNC: 13.5 GM/DL (ref 10.1–14.3)
MCHC RBC AUTO-ENTMCNC: 31 % (ref 30–34)
MCV RBC AUTO: 106 FL (ref 79–97)
PH UR STRIP: 5 [PH] (ref 5–7)
PLATELET # BLD: 131 K/MM3 (ref 140–440)
PROTEIN/CREATININE RATIO,URINE: 0.29
RBC # BLD AUTO: 4.07 M/MM3 (ref 3.65–5.03)
RBC #/AREA URNS HPF: 2 /HPF (ref 0–6)
UROBILINOGEN UR-MCNC: 2 MG/DL (ref ?–2)
WBC #/AREA URNS HPF: 2 /HPF (ref 0–6)

## 2020-05-19 RX ADMIN — HEPARIN SODIUM SCH MLS/HR: 5000 INJECTION, SOLUTION INTRAVENOUS at 07:30

## 2020-05-19 RX ADMIN — ARFORMOTEROL TARTRATE SCH: 15 SOLUTION RESPIRATORY (INHALATION) at 21:00

## 2020-05-19 RX ADMIN — IPRATROPIUM BROMIDE AND ALBUTEROL SULFATE SCH AMPUL: .5; 3 SOLUTION RESPIRATORY (INHALATION) at 20:59

## 2020-05-19 RX ADMIN — ASPIRIN SCH MG: 325 TABLET, COATED ORAL at 11:52

## 2020-05-19 RX ADMIN — FUROSEMIDE SCH: 10 INJECTION, SOLUTION INTRAVENOUS at 17:40

## 2020-05-19 RX ADMIN — Medication SCH ML: at 11:52

## 2020-05-19 RX ADMIN — BUDESONIDE SCH MG: 0.5 INHALANT RESPIRATORY (INHALATION) at 10:27

## 2020-05-19 RX ADMIN — IPRATROPIUM BROMIDE AND ALBUTEROL SULFATE SCH AMPUL: .5; 3 SOLUTION RESPIRATORY (INHALATION) at 10:27

## 2020-05-19 RX ADMIN — IPRATROPIUM BROMIDE AND ALBUTEROL SULFATE SCH AMPUL: .5; 3 SOLUTION RESPIRATORY (INHALATION) at 16:28

## 2020-05-19 RX ADMIN — BUDESONIDE SCH MG: 0.5 INHALANT RESPIRATORY (INHALATION) at 20:59

## 2020-05-19 RX ADMIN — FUROSEMIDE SCH: 10 INJECTION, SOLUTION INTRAVENOUS at 06:45

## 2020-05-19 RX ADMIN — ARFORMOTEROL TARTRATE SCH MCG: 15 SOLUTION RESPIRATORY (INHALATION) at 10:27

## 2020-05-19 NOTE — EVENT NOTE
Date: 05/19/20


Responded to code met;


Patient was lethargic, blood sugars were in 20s


Code met was called, patient did not have IV access


After multiple attempts the nurse could secure IV access


IV D50 x2 given, advised Accu-Cheks q. 1 hour x 4


D10W slow IV 42 mL/h if patient goes into hypoglycemia again


Patient's vital signs reviewed stable, stat BMP ordered


Signed out to covering hospitalist

## 2020-05-19 NOTE — PROGRESS NOTE
Assessment and Plan





Elevated troponin


Atrial flutter with variable conduction rate, the chronicity is uncertain 


   on cardizem and sotalol 


Dilated cardiomyopathy, uncertain duration


   LVEF 20-25%


Cor-pulmonale


   severe pulmonary hypertension with pulmonary artery systolic pressure of 82, 

consistent with severe cor pulmonale.


COPD exacerbation


   on home oxygen


Hyponatremia





Ischemic cardiac work-up depends on clinical course.  


Continue cardizem and sotalol for suppression of the atrial flutter. Patient 

will ultimately need long-term oral anticoagulation.








Subjective


Date of service: 05/19/20


Interval history: 





Patient is resting in bed comfortably. Remains on high flow oxygen.


Aflutter with a well controlled ventricular rate on telemetry.





Objective


                                   Vital Signs











  Temp Pulse Pulse Resp Resp Resp BP


 


 05/19/20 08:18       


 


 05/19/20 05:58   96 H      86/51


 


 05/19/20 05:55   96 H   17   


 


 05/19/20 05:09   105 H     


 


 05/19/20 02:00       17 


 


 05/18/20 23:08  97.4 F L  58 L   20    93/51


 


 05/18/20 23:00   97 H   17   


 


 05/18/20 21:34    109 H   20  


 


 05/18/20 21:33       


 


 05/18/20 19:57  98 F  75   18   


 


 05/18/20 19:50   100 H     


 


 05/18/20 19:07  98.0 F  83   20    83/58


 


 05/18/20 18:36   102 H      145/100


 


 05/18/20 14:48       


 


 05/18/20 14:38   104 H      100/67


 


 05/18/20 11:57  98.0 F  101 H   18    123/83














  BP BP Pulse Ox


 


 05/19/20 08:18    92


 


 05/19/20 05:58   


 


 05/19/20 05:55  91/68  86/56  92


 


 05/19/20 05:09   


 


 05/19/20 02:00   


 


 05/18/20 23:08    84


 


 05/18/20 23:00    92


 


 05/18/20 21:34   


 


 05/18/20 21:33    93


 


 05/18/20 19:57   82/56  92


 


 05/18/20 19:50   


 


 05/18/20 19:07    91


 


 05/18/20 18:36   


 


 05/18/20 14:48    91


 


 05/18/20 14:38   


 


 05/18/20 11:57    81 L














- Physical Examination


General: No Apparent Distress


Cardiac: Positive: irregularly irregular





- Labs and Meds


                                 Cardiac Enzymes











  05/18/20 Range/Units





  16:23 


 


CK-MB (CK-2)  3.5  (0.0-4.0)  ng/mL








                                       CBC











  05/19/20 Range/Units





  09:32 


 


WBC  11.4 H  (4.5-11.0)  K/mm3


 


RBC  4.07  (3.65-5.03)  M/mm3


 


Hgb  13.5  (10.1-14.3)  gm/dl


 


Hct  42.9  (30.3-42.9)  %


 


Plt Count  131 L  (140-440)  K/mm3








                          Comprehensive Metabolic Panel











  05/18/20 05/19/20 Range/Units





  16:23 09:32 


 


Sodium  123 L  128 L  (137-145)  mmol/L


 


Potassium   4.5  (3.6-5.0)  mmol/L


 


Chloride   82.4 L  ()  mmol/L


 


Carbon Dioxide   31 H  (22-30)  mmol/L


 


BUN   25 H  (7-17)  mg/dL


 


Creatinine   1.9 H  (0.7-1.2)  mg/dL


 


Glucose   61 L  ()  mg/dL


 


Calcium   8.2 L  (8.4-10.2)  mg/dL

## 2020-05-19 NOTE — CONSULTATION
History of Present Illness


Consult date: 20


Requesting physician: SHERI OLGUIN


Reason for consult: COPD, hypoxemia


History of present illness: 





70-year-old female with known history of COPD, hypertension and CHF presenting 

to the emergency room today complaining of lower extremity swelling, chest pain 

for the past 24 to 48 hours.  Patient is on home oxygen for COPD at 3L/min, 

continuous and she is also on Lasix for CHF. 


 She however indicates that she has been out of her Lasix for some time now and 

she has not been able to get a refill from her primary care physician.


Chest pain is said to be left-sided and felt like pressure with no radiation.  


There is no known relieving or exacerbating factor.  


She denies any nausea vomiting, no headache or dizziness. 


 She denies any fever or chills. 


Patient denies any sick contacts and no recent travel.  


She indicates she has been doing self-quarantine for the past 3 months because 

of COVID-19.


She states her community pulmonologist is Dr. Nunes. 


She continues to smoke cigarettes.





Work-up in the emergency room however reveals elevated BNP,


She had some Lasix in the emergency room with some relief.


CXR on admission is significant for cardiomegally, hilar prominence and alveolar

edema.


I have been consulted for acute and chronic hypoxic respiratory failure and 

COPD.


Thank you.


Patient was seen and  examined.


Vitals, labs, medications, chart and imaging were reviewed.


she is lying in bed with some mild respiratory distress on supplemental oxygen 


She is able to answer my questions appropriately but does appear uncomfortable.


She is currently on high flow oxygen at 55%, 30L with oxygen saturations of 92%





ROS: 


Stated complaint: SOB/PEDAL EDEMA


Other details as noted in HPI


Comment: All other systems reviewed and negative


Constitutional: denies: chills, fever


Respiratory:  Positive orthopnea, shortness of breath, SOB with exertion.  

denies: cough, stridor, wheezing


Cardiovascular: Positive dyspnea on exertion, orthopnea.  denies: palpitations, 

chest pain


Gastrointestinal: denies: abdominal pain, nausea, vomiting, diarrhea, 

constipation, hematemesis, melena, hematochezia


Musculoskeletal: denies: back pain


Neurological: denies: headache, weakness, numbness, paresthesias, confusion, 

abnormal gait





 Past Medical History


Hx Hypertension: Yes


Hx Congestive Heart Failure: Yes


Hx Seizures: No


Hx Asthma: No


Hx COPD: Yes (3L home O2)





- Surgical History


Past Surgical History?: Yes


Additional Surgical History: .  TUBAL LIGATION





- Social History


Smoking Status: Current smoker


Substance Use Type: None











Past History


Past Medical History: COPD, hypertension


Past Surgical History: , Other (Tubal ligation)


Social history: smoking (Smokes about half a pack of cigarette daily)


Family history: no significant family history





Medications and Allergies


                                    Allergies











Allergy/AdvReac Type Severity Reaction Status Date / Time


 


No Known Allergies Allergy   Unverified 18 11:23











                                Home Medications











 Medication  Instructions  Recorded  Confirmed  Last Taken  Type


 


No Known Home Medications [No  20 Unknown History





Reported Home Medications]     











Active Meds: 


Active Medications





Acetaminophen (Tylenol)  650 mg PO Q4H PRN


   PRN Reason: Pain MILD(1-3)/Fever >100.5/HA


Albuterol (Proventil)  2.5 mg IH Q4HRT PRN


   PRN Reason: Shortness Of Breath


Albuterol/Ipratropium (Duoneb *Not For Prn Use*)  1 ampul IH TIDRT [facility]


   Last Admin: 20 10:27 Dose:  1 ampul


   Documented by: 


Arformoterol Tartrate (Brovana Nebu)  15 mcg IH Q12HRT [facility]


   Last Admin: 20 10:27 Dose:  15 mcg


   Documented by: 


Aspirin (Ecotrin)  325 mg PO QDAY [facility]


   Last Admin: 20 11:52 Dose:  325 mg


   Documented by: 


Atorvastatin Calcium (Lipitor)  40 mg PO QHS [facility]


   Last Admin: 20 22:08 Dose:  40 mg


   Documented by: 


Budesonide (Pulmicort)  0.5 mg IH Q12HRT [facility]


   Last Admin: 20 10:27 Dose:  0.5 mg


   Documented by: 


Diltiazem HCl (Cardizem)  30 mg PO Q6HR [facility]


   Last Admin: 20 12:01 Dose:  Not Given


   Documented by: 


Furosemide (Lasix)  40 mg IV BID@0600,1800 [facility]


   Last Admin: 20 06:45 Dose:  Not Given


   Documented by: 


Heparin Sodium/Sodium Chloride (Heparin/ 0.45% Nacl-25,000 Unit/500 Ml)  25,000 

unit in 500 mls @ 20 mls/hr IV TITRATE AMANDA; Protocol


   Last Admin: 20 07:30 Dose:  1,000 units/hr, 20 mls/hr


   Documented by: 


Magnesium Hydroxide (Milk Of Magnesia)  30 ml PO Q4H PRN


   PRN Reason: Constipation


Morphine Sulfate (Morphine)  2 mg IV Q5MIN PRN


   PRN Reason: Chest Pain unrelieved by NTG


Nitroglycerin (Nitrostat)  0.4 mg SL .Q5MIN PRN


   PRN Reason: Chest Pain


Ondansetron HCl (Zofran)  4 mg IV Q8H PRN


   PRN Reason: Nausea And Vomiting


Sodium Chloride (Sodium Chloride Flush Syringe 10 Ml)  10 ml IV BID [facility]


   Last Admin: 20 11:52 Dose:  10 ml


   Documented by: 


Sodium Chloride (Sodium Chloride Flush Syringe 10 Ml)  10 ml IV PRN PRN


   PRN Reason: LINE FLUSH


Sotalol HCl (Betapace)  80 mg PO Q12HR [facility]


   Last Admin: 20 11:51 Dose:  80 mg


   Documented by: 











Physical Examination


Vital signs: 


                                   Vital Signs











Pulse Resp BP Pulse Ox


 


 116 H  20   119/74   94 


 


 20 21:51  20 21:51  20 21:51  20 21:51











VITAL SIGNS:  Reviewed.    


GENERAL:  The patient appears normally developed, morbidly obese  woman

vital signs as documented.


HEAD:  No signs of head trauma.


EYES:  Pupils are equal.  Extraocular motions intact.  


EARS:  Hearing grossly intact.


MOUTH:  Oropharynx is normal. 


NECK:  No adenopathy, no JVD.  


CHEST:  Chest with diminished breath sounds bilaterally.  No wheezes, rales, or 

rhonchi.  


CARDIAC:  Regular rate and rhythm.  S1 and S2, without murmurs, gallops, or 

rubs.


VASCULAR: Bilateral +1 pitting edema.  Peripheral pulses normal and equal in all

extremities.


ABDOMEN:  Soft, non tender and non distended.  No   rebound or guarding, and no 

masses palpated.   Bowel Sounds normal.


MUSCULOSKELETAL:  Good range of motion of all major joints. Extremities without 

clubbing, cyanosis.  Bilateral +1 pitting edema.  


Nicotine staining of digits


NEUROLOGIC EXAM:  Alert and oriented x 3  


 No focal sensory or strength deficits.   Speech normal.  Follows commands.


PSYCHIATRIC:  Flat mood











Results





- Laboratory Findings


CBC and BMP: 


                                 20 05:35





                                 20 05:35


PT/INR, D-dimer











PT  15.3 Sec. (12.2-14.9)  H  20  03:25    


 


INR  1.20  (0.87-1.13)  H  20  03:25    








Abnormal lab findings: 


                                  Abnormal Labs











  20





  22:13 22:13 00:00


 


WBC   


 


Hct  43.2 H   45.8 H


 


MCV  107 H   106 H


 


MCH  34 H   33 H


 


RDW  20.2 H   19.7 H


 


Plt Count   


 


Seg Neutrophils %  77.3 H   78.8 H


 


PT   


 


INR   


 


Heparin Anti-Xa Level   


 


Sodium   130 L 


 


Potassium   


 


Chloride   85.0 L 


 


Carbon Dioxide   35 H 


 


BUN   


 


Creatinine   


 


Glucose   112 H 


 


POC Glucose   


 


Calcium   


 


ALT   5 L 


 


Troponin T   


 


NT-Pro-B Natriuret Pep   3719 H 


 


Albumin   3.4 L 


 


LDL Cholesterol Direct   














  20





  00:00 00:12 03:25


 


WBC   


 


Hct   


 


MCV   


 


MCH   


 


RDW   


 


Plt Count   


 


Seg Neutrophils %   


 


PT   


 


INR   


 


Heparin Anti-Xa Level   


 


Sodium  131 L  


 


Potassium   


 


Chloride  85.3 L  


 


Carbon Dioxide  35 H  


 


BUN   


 


Creatinine   


 


Glucose  108 H  


 


POC Glucose   


 


Calcium   


 


ALT   


 


Troponin T   0.051 H D  0.113 H* D


 


NT-Pro-B Natriuret Pep   


 


Albumin   


 


LDL Cholesterol Direct   33 L 














  20





  03:25 03:25 03:25


 


WBC   


 


Hct   


 


MCV  107 H  


 


MCH  34 H  


 


RDW  20.1 H  


 


Plt Count   


 


Seg Neutrophils %  77.0 H  


 


PT   15.3 H 


 


INR   1.20 H 


 


Heparin Anti-Xa Level   


 


Sodium    133 L


 


Potassium   


 


Chloride    86.1 L


 


Carbon Dioxide    36 H


 


BUN   


 


Creatinine   


 


Glucose    116 H


 


POC Glucose   


 


Calcium   


 


ALT   


 


Troponin T   


 


NT-Pro-B Natriuret Pep   


 


Albumin   


 


LDL Cholesterol Direct   














  20





  05:29 13:51 04:45


 


WBC   


 


Hct   


 


MCV    107 H


 


MCH    34 H


 


RDW    20.1 H


 


Plt Count    136 L


 


Seg Neutrophils %   


 


PT   


 


INR   


 


Heparin Anti-Xa Level   0.14 L 


 


Sodium   


 


Potassium   


 


Chloride   


 


Carbon Dioxide   


 


BUN   


 


Creatinine   


 


Glucose   


 


POC Glucose   


 


Calcium   


 


ALT   


 


Troponin T  0.126 H*  


 


NT-Pro-B Natriuret Pep   


 


Albumin   


 


LDL Cholesterol Direct   














  20





  04:45 16:23 16:23


 


WBC   


 


Hct   


 


MCV   


 


MCH   


 


RDW   


 


Plt Count   


 


Seg Neutrophils %   


 


PT   


 


INR   


 


Heparin Anti-Xa Level   0.27 L 


 


Sodium  127 L   123 L


 


Potassium  5.1 H D  


 


Chloride  83.9 L  


 


Carbon Dioxide  32 H  


 


BUN   


 


Creatinine  1.3 H D  


 


Glucose  116 H  


 


POC Glucose   


 


Calcium  8.3 L  


 


ALT   


 


Troponin T   


 


NT-Pro-B Natriuret Pep   


 


Albumin   


 


LDL Cholesterol Direct   














  20





  18:42 09:32 09:32


 


WBC   11.4 H 


 


Hct   


 


MCV   106 H 


 


MCH   33 H 


 


RDW   19.5 H 


 


Plt Count   131 L 


 


Seg Neutrophils %   


 


PT   


 


INR   


 


Heparin Anti-Xa Level   


 


Sodium    128 L


 


Potassium   


 


Chloride    82.4 L


 


Carbon Dioxide    31 H


 


BUN    25 H


 


Creatinine    1.9 H


 


Glucose    61 L


 


POC Glucose  135 H  


 


Calcium    8.2 L


 


ALT   


 


Troponin T   


 


NT-Pro-B Natriuret Pep   


 


Albumin   


 


LDL Cholesterol Direct   














- Diagnostic Findings


Chest x-ray: image reviewed


[employer]: report reviewed (No DVT in bilateral lower extremities)


Additional studies: 


Transthoracic echocardiogram


-4 chamber dilated cardiomyopathy, right chamber is severely dilated with severe

pulmonary HTN PASP 82


LVEF 20-25%





Assessment and Plan


Acute Respiratory distress WITH HYPOXIA


COPD


Severe pulmonary hypertension- probably combination of Group 2 and 3


-PASP 82


Hyponatremia


Tobacco use disorder/Nicotine dependence


Non-ST elevation MI 


Acute on chronic congestive heart failure-systolic, EF 20-25%


Acute kidney injury secondary to vasomotor nephropathy


Bilateral lower extremity edema


Anemia of chronic disease 


Morbid obesity








Recommendations


-ABG


-Follow up CXR to evaluate pulmonary infiltrates


-Supplemental oxygen wean to keep O2 sats 88-90%


-Bronchodilators- SAIDA/JULIEN and inhaled corticosteroids


-Currently on NSTEMI protocol per Cardiology


-On diuretics- monitor hemodynamics, urine output and renal function closely.


The patient has pulmonary hypertension and needs close monitoring to ensure 

adequate venous return


-Get CMP to evaluate her hepatic function


-Aspiration precautions


-VTE prophylaxis- on therapeutic heparin for NSTEMI


-Monitor hemoglobin trends, transfuse as indicated to keep hgB >7g/dL


-Heart failure measures per cardiology


-Nicotine withdrawal precautions


-Smoking cessation counselling


-Continue management of underlying COPD


-Chronic home medications as clinically indicated


-Will need right and left heart cath 


-Sleep apnea evaluation and treatment as outpatient





Monitor closely, she at risk of acute decompensation from her cardiorespiratory 

disease





Life threatening condition: Severe pulmonary HTN, acute and chronic  hypoxic 

respiratory failure on high flow oxygen, acute on chronic systolic heart 

failure, hyponatremia


Morbidity/Mortality: High


complexity of medical decision making: High


Critical care time 35 minutes





Thank you.


Will follow


Please do not hesitate to call with questions or concerns

## 2020-05-19 NOTE — PROGRESS NOTE
Assessment and Plan


Assessment and plan: 


70-year-old female with known history of COPD tension and CHF presenting to the 

emergency room today complaining of lower extremity swelling, chest pain for the

past 24 to 48 hours.  Patient is on home oxygen for COPD and she is also on 

Lasix for CHF.  She however indicates that she has been out of her Lasix for 

some time now and she has not been able to get a refill from her primary care 

physician.


Chest pain is said to be left-sided and felt like pressure with no radiation.  

There is no known relieving or exacerbating factor.  She denies any nausea 

vomiting, no headache or dizziness.  She denies any fever or chills.  Patient 

denies any sick contacts and no recent travel.  She indicates she has been doing

self Quinatime for the past 3 months because of COVID-19.





Work-up in the emergency room however reveals elevated BNP, chemistry, pleural 

effusion with pulmonary vascular congestion on chest x-ray.


She had some Lasix in the emergency room with some relief.








Chest x-ray: There is enlargement of the cardiac silhouette. There is venous 

congestion and mild bilateral pulmonary edema. There is bilateral hilar 

prominence which appears similar to the prior study likely representing 

prominent pulmonary arteries.





5/18: Now on High flow and also noted to have elevated creatinine, will obtain 

Pulmonary evaluation in addition to current management. Monitor Hyponatremia. 

Continue cardiology management with rate control meds and possible ischemia work

up.  Also obtain a nephrology consultation due to rising creatinine.  Will defer

to cardiology for management of Lasix.





5/19: Patient remains critically ill, on high flow oxygen in mild distress








Non-ST elevation MI 


acute on chronic congestive heart failure likely systolic


Acute Respiratory distress WITH HYPOXIA/on high flow oxygen


Acute kidney injury secondary to vasomotor nephropathy


Pulmonary hypotension


Chronic hypotension


Hyponatremia


Bilateral lower extremity edema


Anemia of chronic disease 


COPD


Morbid obesity/BMI 38.6








Plan


Continue supportive care. NSTEMI protocol


Cardiology consulted


Monitor sodium closely considering diuretic therapy.


Doppler bilateral lower extremity to rule out PE DVTs


Pain control


Continue management of underlying COPD with oxygen therapy will likely need home

O2 evaluation prior to discharge


Weight loss strongly encouraged 15 minutes of time spent on counseling


DVT and GI prophylaxis 





Patient is morbidly obese, multiple medical problems


On high flow oxygen, poor prognosis





Monitor closely and adjust management as needed


Plan of care reviewed with the patient and her nurse








History


Interval history: 


Patient seen and examined at the bedside


Patient's chart, consultants recommendations, tests reviewed


Patient is morbidly obese, on high flow oxygen


In mild distress, complains of shortness of breath, denies chest pain


Vital signs noted








Hospitalist Physical





- Constitutional


Vitals: 


                                        











Temp Pulse Resp BP Pulse Ox


 


 97.0 F L  100 H  24   68/41   91 


 


 05/19/20 16:21  05/19/20 16:50  05/19/20 16:25  05/19/20 16:21  05/19/20 16:45











General appearance: Present: mild distress, well-nourished, obese (Morbidly 

obese), other (On high flow oxygen)





- EENT


Eyes: Present: PERRL, EOM intact





- Neck


Neck: Present: supple, normal ROM





- Respiratory


Respiratory effort: normal


Respiratory: bilateral: diminished, rhonchi, negative: rales, wheezing





- Cardiovascular


Rhythm: regular


Heart Sounds: Present: S1 & S2





- Extremities


Extremities: no ischemia, No edema





- Abdominal


General gastrointestinal: soft, non-tender, non-distended, normal bowel sounds





- Integumentary


Integumentary: Present: clear, warm





- Psychiatric


Psychiatric: appropriate mood/affect, cooperative





- Neurologic


Neurologic: moves all extremities





HEART Score





- HEART Score


EKG: Non-specific


Age: > 65


Risk factors: > 3 risk factors or hx of atherosclerotic disease


Troponin: 


                                        











Troponin T  0.126 ng/mL (0.00-0.029)  H*  05/17/20  05:29    











Troponin: < normal limit





- Critical Actions


Critical Actions: 4-6 pts:12-16.6% risk of adverse cardiac event. Should be 

admitted





Results





- Labs


CBC & Chem 7: 


                                 05/19/20 09:32





                                 05/19/20 09:32


Labs: 


                             Laboratory Last Values











WBC  11.4 K/mm3 (4.5-11.0)  H  05/19/20  09:32    


 


RBC  4.07 M/mm3 (3.65-5.03)   05/19/20  09:32    


 


Hgb  13.5 gm/dl (10.1-14.3)   05/19/20  09:32    


 


Hct  42.9 % (30.3-42.9)   05/19/20  09:32    


 


MCV  106 fl (79-97)  H  05/19/20  09:32    


 


MCH  33 pg (28-32)  H  05/19/20  09:32    


 


MCHC  31 % (30-34)   05/19/20  09:32    


 


RDW  19.5 % (13.2-15.2)  H  05/19/20  09:32    


 


Plt Count  131 K/mm3 (140-440)  L  05/19/20  09:32    


 


Lymph % (Auto)  16.4 % (13.4-35.0)   05/17/20  03:25    


 


Mono % (Auto)  5.8 % (0.0-7.3)   05/17/20  03:25    


 


Eos % (Auto)  0.3 % (0.0-4.3)   05/17/20  03:25    


 


Baso % (Auto)  0.5 % (0.0-1.8)   05/17/20  03:25    


 


Lymph #  1.4 K/mm3 (1.2-5.4)   05/17/20  03:25    


 


Mono #  0.5 K/mm3 (0.0-0.8)   05/17/20  03:25    


 


Eos #  0.0 K/mm3 (0.0-0.4)   05/17/20  03:25    


 


Baso #  0.0 K/mm3 (0.0-0.1)   05/17/20  03:25    


 


Seg Neutrophils %  77.0 % (40.0-70.0)  H  05/17/20  03:25    


 


Seg Neutrophils #  6.7 K/mm3 (1.8-7.7)   05/17/20  03:25    


 


PT  15.3 Sec. (12.2-14.9)  H  05/17/20  03:25    


 


INR  1.20  (0.87-1.13)  H  05/17/20  03:25    


 


APTT  29.0 Sec. (24.2-36.6)   05/17/20  03:25    


 


Heparin Anti-Xa Level  < 0.10 U.I./ml (0.3-0.7)  L  05/19/20  13:37    


 


Sodium  128 mmol/L (137-145)  L  05/19/20  09:32    


 


Potassium  4.5 mmol/L (3.6-5.0)   05/19/20  09:32    


 


Chloride  82.4 mmol/L ()  L  05/19/20  09:32    


 


Carbon Dioxide  31 mmol/L (22-30)  H  05/19/20  09:32    


 


Anion Gap  19 mmol/L  05/19/20  09:32    


 


BUN  25 mg/dL (7-17)  H  05/19/20  09:32    


 


Creatinine  1.9 mg/dL (0.7-1.2)  H  05/19/20  09:32    


 


Estimated GFR  32 ml/min  05/19/20  09:32    


 


BUN/Creatinine Ratio  13 %  05/19/20  09:32    


 


Glucose  61 mg/dL ()  L  05/19/20  09:32    


 


POC Glucose  135  ()  H  05/18/20  18:42    


 


Calcium  8.2 mg/dL (8.4-10.2)  L  05/19/20  09:32    


 


Total Bilirubin  0.70 mg/dL (0.1-1.2)   05/16/20  22:13    


 


AST  10 units/L (5-40)   05/16/20  22:13    


 


ALT  5 units/L (7-56)  L  05/16/20  22:13    


 


Alkaline Phosphatase  74 units/L ()   05/16/20  22:13    


 


Total Creatine Kinase  112 units/L ()   05/18/20  16:23    


 


CK-MB (CK-2)  3.5 ng/mL (0.0-4.0)   05/18/20  16:23    


 


Troponin T  0.126 ng/mL (0.00-0.029)  H*  05/17/20  05:29    


 


NT-Pro-B Natriuret Pep  3719 pg/mL (0-900)  H  05/16/20  22:13    


 


Total Protein  7.1 g/dL (6.3-8.2)   05/16/20  22:13    


 


Albumin  3.4 g/dL (3.9-5)  L  05/16/20  22:13    


 


Albumin/Globulin Ratio  0.9 %  05/16/20  22:13    


 


Triglycerides  68 mg/dL (2-149)   05/17/20  00:12    


 


Cholesterol  83 mg/dL ()   05/17/20  00:12    


 


LDL Cholesterol Direct  33 mg/dL ()  L  05/17/20  00:12    


 


HDL Cholesterol  49 mg/dL (40-59)   05/17/20  00:12    


 


Cholesterol/HDL Ratio  1.69 %  05/17/20  00:12    


 


Urine Color  Mandi  (Yellow)   05/19/20  Unknown


 


Urine Turbidity  Slightly-cloudy  (Clear)   05/19/20  Unknown


 


Urine pH  5.0  (5.0-7.0)   05/19/20  Unknown


 


Ur Specific Gravity  1.014  (1.003-1.030)   05/19/20  Unknown


 


Urine Protein  30 mg/dl mg/dL (Negative)   05/19/20  Unknown


 


Urine Glucose (UA)  Neg mg/dL (Negative)   05/19/20  Unknown


 


Urine Ketones  Neg mg/dL (Negative)   05/19/20  Unknown


 


Urine Blood  Neg  (Negative)   05/19/20  Unknown


 


Urine Nitrite  Neg  (Negative)   05/19/20  Unknown


 


Urine Bilirubin  Neg  (Negative)   05/19/20  Unknown


 


Urine Urobilinogen  2.0 mg/dL (<2.0)   05/19/20  Unknown


 


Ur Leukocyte Esterase  Sm  (Negative)   05/19/20  Unknown


 


Urine WBC (Auto)  2.0 /HPF (0.0-6.0)   05/19/20  Unknown


 


Urine RBC (Auto)  2.0 /HPF (0.0-6.0)   05/19/20  Unknown


 


U Epithel Cells (Auto)  2.0 /HPF (0-13.0)   05/19/20  Unknown


 


Urine Bacteria (Auto)  1+ /HPF (Negative)   05/19/20  Unknown


 


Urine Osmolality  319 Mosm/kg  05/19/20  Unknown


 


Urine Creatinine  216.1 mg/dL (0.1-20.0)  H  05/19/20  Unknown


 


Protein/Creatinin Ratio  0.29   05/19/20  Unknown


 


Urine Sodium  10 mmol/L  05/19/20  Unknown


 


Urine Total Protein  62 mg/dL (5-11.8)  H  05/19/20  Unknown














- Diagnostic Impressions


Diagnostic Impressions: 








Echocardiogram  05/16/20 23:50


Transthoracic Echocardiogram


 


Indication:


CHF


BP:           84/59


HR:           117


 


Conclusions


 *4-chamber dilated cardiomyopathy.


 *The right heart chambers are both severely dilated. There is


moderately severe tricuspid regurgitation, and severe pulmonary HTN,


with PASP of 82mmHg.


 *Left heart chambers are moderately dilated.


 *Global left ventricular systolic function is severely decreased.


 *The estimated ejection fraction is 20-25%. 


 *There is mild mitral regurgitation. 


 


Findings     


Left Ventricle:


The left ventricular chamber size is moderately dilated. There is no


left ventricular hypertrophy. Severe global hypokinesis of the left


ventricle is observed. Global left ventricular systolic function is


severely decreased. The estimated ejection fraction is 20-25%.  Abnormal


left ventricular diastolic function is observed. 


 


Left Atrium:


The left atrium is moderate to severely dilated.  


 


Right Ventricle:


The right ventricle is severely dilated.  The right ventricular global


systolic function is severely reduced. 


 


Right Atrium:


The right atrial cavity size is severely dilated. 


 


Aortic Valve:


The aortic valve leaflets are moderately thickened. There is trace of


aortic regurgitation. There is no evidence of aortic stenosis. 


 


Mitral Valve:


The mitral valve leaflets are moderately thickened. There is mild mitral


regurgitation.  There is no evidence of mitral stenosis. 


 


Tricuspid Valve:


The tricuspid valve leaflets are normal.  There is moderate to severe


tricuspid regurgitation. The right ventricular systolic pressure is


calculated at 82 mmHg.  There is evidence of severe pulmonary


hypertension. There is no tricuspid stenosis. 


 


Pulmonic Valve:


The pulmonic valve appears normal. There is mild pulmonic regurgitation.


 


 


Pericardium:


A trivial pericardial effusion is visualized. 


 


Aorta:


There is no dilatation of the ascending aorta. There is no dilatation of


the aortic root. 


 


Venous:


The inferior vena cava is dilated.  There is less than 50% respiratory


change in the inferior vena cava dimension. 


 


Measurements     


Chambers 2D


Name                    Value         Normal Range            


IVSd (2D)               1.56 cm       (0.6 - 1.1)             


LVPWd (2D)              1.56 cm       (0.6 - 1.1)             


LVIDd (2D)              5.05 cm       (3.7 - 5.6)             


LVIDs (2D)              4.47 cm       (2 - 3.8)               


LV FS (2D)              11.64 %       -                        


EF Teichholz (2D)       25.1 %        -                        


Ao root diameter (2D)   3.06 cm       (2 - 3.7)               


 


Volumes/Mass


Name                    Value         Normal Range            


LA ESV SP 4CH (A/L)     76.11 ml      -                        


LA ESV SP 2CH (A/L)     48.94 ml      -                        


LA ESV BP (A/L)         62.38 ml      -                        


LA ESV SP 4CH (MOD)     70.31 ml      -                        


LA ESV SP 2CH (MOD)     46.04 ml      -                        


LA ESV BP (MOD)         58.03 ml      -                        


LA ESV BP (MOD) index   28.87 ml/m2   -                        


LV EDV SP 4CH (MOD)     81.51 ml      -                        


LV ESV SP 4CH (MOD)     46.31 ml      -                        


EF SP 4CH (MOD)         43.18 %       -                        


LV EDV SP 2CH (MOD)     84.04 ml      -                        


LV ESV SP 2CH (MOD)     60.72 ml      -                        


EF SP 2CH (MOD)         27.76 %       -                        


LV EDV BP               84.52 ml      -                        


LV ESV BP               53.64 ml      -                        


BP EF (MOD)             36.54 %       -                        


 


Aortic Valve


Name                    Value         Normal Range            


LVOT diameter           2.01 cm       -                        


LVOT Vmax               0.64 m/sec    -                        


LVOT VTI                8.01 cm       -                        


LVOT peak gradient      1.62 mmHg     -                        


LVOT mean gradient      0.79 mmHg     -                        


SV LVOT                 25.42 ml      -                        


Ascending Ao            2.81 cm       -                        


 


Tricuspid Valve


Name                    Value         Normal Range            


TR Vmax                 4.09 m/sec    -                        


TR peak gradient        66.78 mmHg    -                        


RAP                     15 mmHg       -                        


RVSP                    82 mmHg       -                        


 


Pulmonic Valve/Qp:Qs


Name                    Value         Normal Range            


PV Vmax                 0.87 m/sec    -                        


PV peak gradient        3.06 mmHg     -                        


FL end-diastolic Vmax   2.66 m/sec    -                        


RVOT Vmax               0.7 m/sec     -                        


RVOT peak gradient      1.96 mmHg     -                        


PV acceleration time    53.28 msec    -                        


 


Electronically signed by: Shade Vela MD on 05/17/2020 15:34:04











Min/IV: 


                                        





Voiding Method                   External Female Catheter


IV Catheter Type [Right          INT / Saline Lock


Forearm]                         


IV Catheter Type [Left Wrist]    INT / Saline Lock


IV Catheter Type [Left Forearm   INT / Saline Lock


]                                











Active Medications





- Current Medications


Current Medications: 














Generic Name Dose Route Start Last Admin





  Trade Name Freq  PRN Reason Stop Dose Admin


 


Acetaminophen  650 mg  05/16/20 23:45 





  Tylenol  PO  





  Q4H PRN  





  Pain MILD(1-3)/Fever >100.5/HA  


 


Albuterol  2.5 mg  05/17/20 14:57 





  Proventil  IH  





  Q4HRT PRN  





  Shortness Of Breath  


 


Albuterol/Ipratropium  1 ampul  05/17/20 20:00  05/19/20 16:28





  Duoneb *Not For Prn Use*  IH   1 ampul





  TIDRT AMANDA   Administration


 


Arformoterol Tartrate  15 mcg  05/17/20 20:00  05/19/20 10:27





  Brovana Nebu  IH   15 mcg





  Q12HRT AMANDA   Administration


 


Aspirin  325 mg  05/17/20 10:00  05/19/20 11:52





  Ecotrin  PO   325 mg





  QDAY AMANDA   Administration


 


Atorvastatin Calcium  40 mg  05/17/20 22:00  05/18/20 22:08





  Lipitor  PO   40 mg





  QHS AMANDA   Administration


 


Budesonide  0.5 mg  05/17/20 20:00  05/19/20 10:27





  Pulmicort  IH   0.5 mg





  Q12HRT AMANDA   Administration


 


Dextrose  500 ml  05/19/20 19:50 





  D10w  IV  05/19/20 19:51 





  ONCE STA  


 


Diltiazem HCl  30 mg  05/17/20 18:00  05/19/20 17:40





  Cardizem  PO   Not Given





  Q6HR AMANDA  


 


Furosemide  40 mg  05/17/20 06:00  05/19/20 17:40





  Lasix  IV   Not Given





  BID@0600,1800 [facility]  


 


Heparin Sodium/Sodium Chloride  25,000 unit in 500 mls @ 20 mls/hr  05/17/20 06:

00  05/19/20 14:31





  Heparin/ 0.45% Nacl-25,000 Unit/500 Ml  IV   1,150 units/hr





  TITRATE [facility]   23 mls/hr





    Titration





  Protocol  





  1,000 UNITS/HR  


 


Magnesium Hydroxide  30 ml  05/16/20 23:45 





  Milk Of Magnesia  PO  





  Q4H PRN  





  Constipation  


 


Morphine Sulfate  2 mg  05/16/20 23:45 





  Morphine  IV  





  Q5MIN PRN  





  Chest Pain unrelieved by NTG  


 


Nitroglycerin  0.4 mg  05/16/20 23:45 





  Nitrostat  SL  





  .Q5MIN PRN  





  Chest Pain  


 


Ondansetron HCl  4 mg  05/16/20 23:45 





  Zofran  IV  





  Q8H PRN  





  Nausea And Vomiting  


 


Sodium Chloride  10 ml  05/17/20 10:00  05/19/20 11:52





  Sodium Chloride Flush Syringe 10 Ml  IV   10 ml





  BID AMANDA   Administration


 


Sodium Chloride  10 ml  05/16/20 23:45 





  Sodium Chloride Flush Syringe 10 Ml  IV  





  PRN PRN  





  LINE FLUSH  


 


Sotalol HCl  80 mg  05/17/20 22:00  05/19/20 11:51





  Betapace  PO   80 mg





  Q12HR AMANDA   Administration

## 2020-05-19 NOTE — CONSULTATION
History of Present Illness





- Reason for Consult


Consult date: 20


acute renal failure, hyponatremia





- History of Present Illness





This is a 70-year-old woman with known history of COPD, HTN, and CHF presenting 

with lower extremity swelling, chest pain.  Patient is on home oxygen for COPD a

nd she is also on Lasix for CHF, but was out of Lasix prior to admission.  No 

known renal disease per patient.    She was restarted on diuretics, and was 

later found to have MILAN and hyponatremia prompting nephrology consultation.  At 

time of consult, she continues to note shortness of breath, notes minimal 

swelling in her legs.   She denies any nausea vomiting, no headache and 

dizziness.  She denies any fever or chills. 





Past History


Past Medical History: COPD, hypertension


Past Surgical History: , Other (Tubal ligation)


Social history: smoking (Smokes about half a pack of cigarette daily)


Family history: no significant family history





Medications and Allergies


                                    Allergies











Allergy/AdvReac Type Severity Reaction Status Date / Time


 


No Known Allergies Allergy   Unverified 18 11:23











                                Home Medications











 Medication  Instructions  Recorded  Confirmed  Last Taken  Type


 


No Known Home Medications [No  20 Unknown History





Reported Home Medications]     











Active Meds: 


Active Medications





Acetaminophen (Tylenol)  650 mg PO Q4H PRN


   PRN Reason: Pain MILD(1-3)/Fever >100.5/HA


Albuterol (Proventil)  2.5 mg IH Q4HRT PRN


   PRN Reason: Shortness Of Breath


Albuterol/Ipratropium (Duoneb *Not For Prn Use*)  1 ampul IH TIDRT [facility]


   Last Admin: 20 10:27 Dose:  1 ampul


   Documented by: 


Arformoterol Tartrate (Brovana Nebu)  15 mcg IH Q12HRT [facility]


   Last Admin: 20 10:27 Dose:  15 mcg


   Documented by: 


Aspirin (Ecotrin)  325 mg PO QDAY [facility]


   Last Admin: 20 10:02 Dose:  325 mg


   Documented by: 


Atorvastatin Calcium (Lipitor)  40 mg PO QHS [facility]


   Last Admin: 20 22:08 Dose:  40 mg


   Documented by: 


Budesonide (Pulmicort)  0.5 mg IH Q12HRT [facility]


   Last Admin: 20 10:27 Dose:  0.5 mg


   Documented by: 


Diltiazem HCl (Cardizem)  30 mg PO Q6HR [facility]


   Last Admin: 20 05:58 Dose:  Not Given


   Documented by: 


Furosemide (Lasix)  40 mg IV BID@0600,1800 [facility]


   Last Admin: 20 06:45 Dose:  Not Given


   Documented by: 


Heparin Sodium/Sodium Chloride (Heparin/ 0.45% Nacl-25,000 Unit/500 Ml)  25,000 

unit in 500 mls @ 20 mls/hr IV TITRATE [facility]; Protocol


   Last Admin: 20 07:30 Dose:  1,000 units/hr, 20 mls/hr


   Documented by: 


Magnesium Hydroxide (Milk Of Magnesia)  30 ml PO Q4H PRN


   PRN Reason: Constipation


Morphine Sulfate (Morphine)  2 mg IV Q5MIN PRN


   PRN Reason: Chest Pain unrelieved by NTG


Nitroglycerin (Nitrostat)  0.4 mg SL .Q5MIN PRN


   PRN Reason: Chest Pain


Ondansetron HCl (Zofran)  4 mg IV Q8H PRN


   PRN Reason: Nausea And Vomiting


Sodium Chloride (Sodium Chloride Flush Syringe 10 Ml)  10 ml IV BID [facility]


   Last Admin: 20 22:08 Dose:  10 ml


   Documented by: 


Sodium Chloride (Sodium Chloride Flush Syringe 10 Ml)  10 ml IV PRN PRN


   PRN Reason: LINE FLUSH


Sotalol HCl (Betapace)  80 mg PO Q12HR [facility]


   Last Admin: 20 05:04 Dose:  Not Given


   Documented by: 











Review of Systems


Constitutional: no weight loss, no weight gain


Ears, nose, mouth and throat: no nasal discharge, no sinus pressure


Cardiovascular: chest pain, edema, dyspnea on exertion, no lightheadedness


Respiratory: shortness of breath, no cough


Gastrointestinal: no abdominal pain, no nausea, no vomiting, no diarrhea


Genitourinary Female: no flank pain, no dysuria


Musculoskeletal: no low back pain


Integumentary: no rash


Neurological: no vertigo, no headaches


Psychiatric: no anxiety, no memory loss





Exam





- Vital Signs


Vital signs: 


                                   Vital Signs











Pulse Resp BP Pulse Ox


 


 116 H  20   119/74   94 


 


 20 21:51  20 21:51  20 21:51  20 21:51














- Physical Exam


Narrative exam: 





Constitutional: mild acute distress, laying uncomfortably on side of bed


Head: NC/AT


Neck: supple


Lungs: on NC, coarse breath sounds


CV: RRR, no M/R/G


Abdomen: soft, non-tender, bowel sounds present


Back: nontender


Extremities: no edema, pulses WNL


Skin: intact


Neuro: no focal deficits, alert and oriented x4, mildly confused to questioning





Results





- Lab Results





                                 20 09:32





                                 20 09:32


                             Most recent lab results











Calcium  8.2 mg/dL (8.4-10.2)  L  20  09:32    














Assessment and Plan








This is a 70 year old woman with CHF, COPD who presents with shortness of 

breath, chest pain now with MILAN, hyponatremia





# Acute Kidney Injury: complex case noted; creatinine on admission 0.7->1.3->1.9

as of this AM.  Suspect this is related to tubular injury in setting of 

hypotension, with changes due to fluid shifting with diuretic therapy and 

cardiorenal physiology.  No obvious nephrotoxins noted. Reviewed urine studies. 

For now, would be cautious with diuretic therapy and defer to pulm/cardiology to

use prn based on respiratory status.  No indication for renal replacement 

therapy at this time, will follow closely given clinical status.


- daily renal labs


- avoid nephrotoxins


- low salt diet


- strict Is/Os


- maintain MAP >70, pressors if needed 





# Hyponatremia: sodium has been labile, but suspect hyponatremia related to 

volume overload.  Reviewed urine studies with urine osm 300, but all studies 

done after furosemide.  Ok to use furosemide for volume prn as noted above; 

furosemide should promote more water loss than sodium, but still need to monitor

for worsening of hyponatremia





# Metabolic Alkalosis: suspect related to underlying pulmonary disease; will 

worsen with excessive diuretic use





# NSTEMI/ CHF/ atrial flutter: appreciate cardiology input





# Shortness of Breath, Respiratory Distress, Pulmonary Hypertension, COPD: 

appreciate pulmonary input





# Anemia of chronic disease

## 2020-05-19 NOTE — EVENT NOTE
Date: 05/19/20





patient follows with Dr. Nunes.  Alerted Dr. Leung and Dr. Clarke to let them 

know.  Placed consult to Dr. Leung.

## 2020-05-20 LAB
ALBUMIN SERPL-MCNC: 3.2 G/DL (ref 3.9–5)
ALT SERPL-CCNC: 1241 UNITS/L (ref 7–56)
BUN SERPL-MCNC: 36 MG/DL (ref 7–17)
BUN/CREAT SERPL: 13 %
CALCIUM SERPL-MCNC: 7.5 MG/DL (ref 8.4–10.2)
HCO3 BLDA-SCNC: 30.5 MMOL/L (ref 20–26)
HCO3 BLDA-SCNC: 31.5 MMOL/L (ref 20–26)
HEMOLYSIS INDEX: 10
PCO2 BLDA: 82.9 MM HG
PCO2 BLDA: 86.4 MM HG
PH BLDA: 7.18 PH UNITS (ref 7.35–7.45)
PH BLDA: 7.18 PH UNITS (ref 7.35–7.45)
PO2 BLDA: 70.1 MM HG (ref 80–90)
PO2 BLDA: 77.9 MM HG (ref 80–90)

## 2020-05-20 PROCEDURE — 06HY33Z INSERTION OF INFUSION DEVICE INTO LOWER VEIN, PERCUTANEOUS APPROACH: ICD-10-PCS | Performed by: INTERNAL MEDICINE

## 2020-05-20 PROCEDURE — 5A09357 ASSISTANCE WITH RESPIRATORY VENTILATION, LESS THAN 24 CONSECUTIVE HOURS, CONTINUOUS POSITIVE AIRWAY PRESSURE: ICD-10-PCS | Performed by: INTERNAL MEDICINE

## 2020-05-20 RX ADMIN — ARFORMOTEROL TARTRATE SCH MCG: 15 SOLUTION RESPIRATORY (INHALATION) at 20:17

## 2020-05-20 RX ADMIN — FUROSEMIDE SCH: 10 INJECTION, SOLUTION INTRAVENOUS at 06:35

## 2020-05-20 RX ADMIN — Medication SCH ML: at 10:42

## 2020-05-20 RX ADMIN — METHYLPREDNISOLONE SODIUM SUCCINATE SCH MG: 40 INJECTION, POWDER, FOR SOLUTION INTRAMUSCULAR; INTRAVENOUS at 21:10

## 2020-05-20 RX ADMIN — BUDESONIDE SCH MG: 0.5 INHALANT RESPIRATORY (INHALATION) at 20:17

## 2020-05-20 RX ADMIN — ASPIRIN SCH MG: 325 TABLET, COATED ORAL at 10:42

## 2020-05-20 RX ADMIN — DEXTROSE AND SODIUM CHLORIDE SCH MLS/HR: 5; .9 INJECTION, SOLUTION INTRAVENOUS at 18:10

## 2020-05-20 RX ADMIN — BUDESONIDE SCH MG: 0.5 INHALANT RESPIRATORY (INHALATION) at 09:25

## 2020-05-20 RX ADMIN — ARFORMOTEROL TARTRATE SCH MCG: 15 SOLUTION RESPIRATORY (INHALATION) at 09:25

## 2020-05-20 RX ADMIN — IPRATROPIUM BROMIDE AND ALBUTEROL SULFATE SCH AMPUL: .5; 3 SOLUTION RESPIRATORY (INHALATION) at 20:17

## 2020-05-20 RX ADMIN — IPRATROPIUM BROMIDE AND ALBUTEROL SULFATE SCH AMPUL: .5; 3 SOLUTION RESPIRATORY (INHALATION) at 09:25

## 2020-05-20 RX ADMIN — NOREPINEPHRINE BITARTRATE SCH MLS/HR: 16 INJECTION, SOLUTION INTRAVENOUS at 17:55

## 2020-05-20 RX ADMIN — METHYLPREDNISOLONE SODIUM SUCCINATE SCH MG: 40 INJECTION, POWDER, FOR SOLUTION INTRAMUSCULAR; INTRAVENOUS at 20:09

## 2020-05-20 RX ADMIN — Medication SCH: at 07:40

## 2020-05-20 RX ADMIN — IPRATROPIUM BROMIDE AND ALBUTEROL SULFATE SCH AMPUL: .5; 3 SOLUTION RESPIRATORY (INHALATION) at 14:58

## 2020-05-20 RX ADMIN — Medication SCH ML: at 21:10

## 2020-05-20 RX ADMIN — HEPARIN SODIUM SCH MLS/HR: 5000 INJECTION, SOLUTION INTRAVENOUS at 07:36

## 2020-05-20 NOTE — PROGRESS NOTE
Assessment and Plan





Elevated troponin


Atrial flutter with variable conduction rate, the chronicity is uncertain 


   on cardizem and sotalol 


Dilated cardiomyopathy, uncertain duration


   LVEF 20-25%


Cor-pulmonale


   severe pulmonary hypertension with pulmonary artery systolic pressure of 82, 

consistent with severe cor pulmonale.


Elevated liver enzymes


COPD exacerbation


   on home oxygen


Hyponatremia














Subjective


Date of service: 05/20/20


Interval history: 





Remains on high flow oxygen. Noted metabolic abnormalities on morning labs.


Aflutter with a well controlled ventricular rate on telemetry.





Objective


                                   Vital Signs











  Temp Pulse Pulse Resp Resp Resp BP


 


 05/20/20 04:05   106 H     


 


 05/20/20 04:00       


 


 05/20/20 03:13  99.3 F  55 L   20    113/86


 


 05/20/20 01:32       


 


 05/20/20 00:20   104 H     


 


 05/19/20 23:01  97.4 F L    15    81/50


 


 05/19/20 22:38       


 


 05/19/20 21:00  97 F L  101 H  116 H  14  24  


 


 05/19/20 20:58       


 


 05/19/20 16:50   100 H     


 


 05/19/20 16:45       


 


 05/19/20 16:25    102 H   24  


 


 05/19/20 16:21  97.0 F L    18    68/41


 


 05/19/20 14:00       17 


 


 05/19/20 12:26   99 H     


 


 05/19/20 12:01   104 H      79/49


 


 05/19/20 11:33       


 


 05/19/20 11:20  97.5 F L    18    79/49














  BP Pulse Ox


 


 05/20/20 04:05  


 


 05/20/20 04:00   90


 


 05/20/20 03:13   84


 


 05/20/20 01:32   92


 


 05/20/20 00:20  


 


 05/19/20 23:01  


 


 05/19/20 22:38   92


 


 05/19/20 21:00  150/101  91


 


 05/19/20 20:58   92


 


 05/19/20 16:50  


 


 05/19/20 16:45   91


 


 05/19/20 16:25  


 


 05/19/20 16:21  


 


 05/19/20 14:00  


 


 05/19/20 12:26  


 


 05/19/20 12:01  


 


 05/19/20 11:33   95


 


 05/19/20 11:20  














- Physical Examination


General: No Apparent Distress


Cardiac: Positive: irregularly irregular


Skin: Positive: Clear


Extremities: Absent: edema





- Labs and Meds


                                 Cardiac Enzymes











  05/20/20 Range/Units





  04:34 


 


AST  3636 H  (5-40)  units/L








                          Comprehensive Metabolic Panel











  05/19/20 05/20/20 Range/Units





  20:10 04:34 


 


Sodium  124 L  129 L  (137-145)  mmol/L


 


Potassium  5.5 H D  4.6  (3.6-5.0)  mmol/L


 


Chloride  82.4 L  83.7 L  ()  mmol/L


 


Carbon Dioxide  23  D  29  (22-30)  mmol/L


 


BUN  31 H  36 H  (7-17)  mg/dL


 


Creatinine  2.1 H  2.8 H  (0.7-1.2)  mg/dL


 


Glucose  212 H  129 H  ()  mg/dL


 


Calcium  8.0 L  7.5 L  (8.4-10.2)  mg/dL


 


AST   3636 H  (5-40)  units/L


 


ALT   1241 H  (7-56)  units/L


 


Alkaline Phosphatase   91  ()  units/L


 


Total Protein   6.3  (6.3-8.2)  g/dL


 


Albumin   3.2 L  (3.9-5)  g/dL

## 2020-05-20 NOTE — PROGRESS NOTE
Assessment and Plan








This is a 70 year old woman with CHF, COPD who presents with shortness of 

breath, chest pain now with MILAN, hyponatremia





# Acute Kidney Injury: complex case noted; creatinine on admission 

0.7->1.3->1.9->2.8 as of this AM.  Suspect this is related to tubular injury in 

setting of hypotension, with changes due to fluid shifting with diuretic therapy

and cardiorenal physiology.  No obvious nephrotoxins noted. Would be cautious 

with diuretic therapy and defer to pulm/cardiology to use prn based on 

respiratory status.  No indication for renal replacement therapy at this time, 

will follow closely given clinical status.


- daily renal labs


- avoid nephrotoxins


- low salt diet


- strict Is/Os


- maintain MAP >70, pressors if needed.  Consider Florinef given hyponatremia 

and mild hyperkalemia with soft BP- this can worsen edema so will defer to 

cardiology/pulmonary if this is appropriate





# Hyponatremia: sodium has been labile, but suspect hyponatremia related to 

volume overload, improved this AM to 129.  Ok to use furosemide for volume prn 

as noted above; furosemide should promote more water loss than sodium, but still

need to monitor for worsening of hyponatremia





# Metabolic Alkalosis: suspect related to underlying pulmonary disease; will 

worsen with excessive diuretic use, stable this AM





# NSTEMI/ CHF/ atrial flutter: appreciate cardiology input





# Shortness of Breath, Respiratory Distress, Pulmonary Hypertension, COPD: 

appreciate pulmonary input





# Anemia of chronic disease 

















Subjective


Date of service: 05/20/20


Interval history: 





Noted to have hypoglycemic event last night.  Remains altered this AM, unable to

provide any further history





Objective





- Exam


Narrative Exam: 





Constitutional: mild acute distress, laying in bed


Head: NC/AT


Neck: supple


Lungs: on NC, coarse breath sounds


CV: RRR, no M/R/G


Abdomen: soft, non-tender, bowel sounds present


Back: nontender


Extremities: no edema, pulses WNL


Skin: intact


Neuro: confused





- Vital Signs


Vital signs: 


                               Vital Signs - 12hr











  05/20/20 05/20/20 05/20/20





  00:20 01:32 03:13


 


Temperature   99.3 F


 


Pulse Rate 104 H  55 L


 


Respiratory   20





Rate   


 


Blood Pressure   113/86


 


O2 Sat by Pulse  92 84





Oximetry   














  05/20/20 05/20/20 05/20/20





  04:00 04:05 10:00


 


Temperature   


 


Pulse Rate  106 H 42 L


 


Respiratory   





Rate   


 


Blood Pressure   


 


O2 Sat by Pulse 90  





Oximetry   














- Lab





                                 05/19/20 09:32





                                 05/20/20 04:34


                             Most recent lab results











Calcium  7.5 mg/dL (8.4-10.2)  L  05/20/20  04:34    


 


Urine Creatinine  216.1 mg/dL (0.1-20.0)  H  05/19/20  Unknown


 


Urine Sodium  10 mmol/L  05/19/20  Unknown


 


Urine Total Protein  62 mg/dL (5-11.8)  H  05/19/20  Unknown














Medications & Allergies





- Medications


Allergies/Adverse Reactions: 


                                    Allergies





No Known Allergies Allergy (Unverified 05/13/18 11:23)


   








Home Medications: 


                                Home Medications











 Medication  Instructions  Recorded  Confirmed  Last Taken  Type


 


No Known Home Medications [No  05/16/20 05/16/20 Unknown History





Reported Home Medications]     











Active Medications: 














Generic Name Dose Route Start Last Admin





  Trade Name Freq  PRN Reason Stop Dose Admin


 


Acetaminophen  650 mg  05/16/20 23:45 





  Tylenol  PO  





  Q4H PRN  





  Pain MILD(1-3)/Fever >100.5/HA  


 


Albuterol  2.5 mg  05/17/20 14:57 





  Proventil  IH  





  Q4HRT PRN  





  Shortness Of Breath  


 


Albuterol/Ipratropium  1 ampul  05/17/20 20:00  05/20/20 09:25





  Duoneb *Not For Prn Use*  IH   1 ampul





  TIDRT AMADNA   Administration


 


Arformoterol Tartrate  15 mcg  05/17/20 20:00  05/20/20 09:25





  Brovana Nebu  IH   15 mcg





  Q12HRT AMANDA   Administration


 


Aspirin  325 mg  05/17/20 10:00  05/20/20 10:42





  Ecotrin  PO   325 mg





  QDAY AMANDA   Administration


 


Atorvastatin Calcium  40 mg  05/17/20 22:00  05/19/20 23:56





  Lipitor  PO   Not Given





  QHS AMANDA  


 


Budesonide  0.5 mg  05/17/20 20:00  05/20/20 09:25





  Pulmicort  IH   0.5 mg





  Q12HRT AMANDA   Administration


 


Diltiazem HCl  30 mg  05/17/20 18:00  05/20/20 07:40





  Cardizem  PO   Not Given





  Q6HR AMANDA  


 


Furosemide  40 mg  05/17/20 06:00  05/20/20 06:35





  Lasix  IV   Not Given





  BID@0600,1800 [facility]  


 


Heparin Sodium/Sodium Chloride  25,000 unit in 500 mls @ 20 mls/hr  05/17/20 

06:00  05/20/20 07:36





  Heparin/ 0.45% Nacl-25,000 Unit/500 Ml  IV   1,550 units/hr





  TITRATE AMANDA   31 mls/hr





    Administration





  Protocol  





  1,000 UNITS/HR  


 


Dextrose  1,000 mls @ 42 mls/hr  05/19/20 21:00 





  D10w  IV  





  AS DIRECT [facility]  


 


Magnesium Hydroxide  30 ml  05/16/20 23:45 





  Milk Of Magnesia  PO  





  Q4H PRN  





  Constipation  


 


Morphine Sulfate  2 mg  05/16/20 23:45 





  Morphine  IV  





  Q5MIN PRN  





  Chest Pain unrelieved by NTG  


 


Nitroglycerin  0.4 mg  05/16/20 23:45 





  Nitrostat  SL  





  .Q5MIN PRN  





  Chest Pain  


 


Ondansetron HCl  4 mg  05/16/20 23:45 





  Zofran  IV  





  Q8H PRN  





  Nausea And Vomiting  


 


Sodium Chloride  10 ml  05/17/20 10:00  05/20/20 10:42





  Sodium Chloride Flush Syringe 10 Ml  IV   10 ml





  BID AMANDA   Administration


 


Sodium Chloride  10 ml  05/16/20 23:45 





  Sodium Chloride Flush Syringe 10 Ml  IV  





  PRN PRN  





  LINE FLUSH

## 2020-05-20 NOTE — ULTRASOUND REPORT
ULTRASOUND ABDOMEN, COMPLETE



INDICATION: Abnormal LFT, possible passive congestion



COMPARISON: None available



LIMITATIONS: Study was difficult due to the patient's body habitus and condition on a ventilator



FINDINGS:

Pancreas: Not well seen but no obvious abnormalities

Abdominal Aorta: Proximal portion shows no obvious abnormalities but no other areas are seen well

IVC: Normal

Liver: Normal

Gallbladder: A moderate size gallstone is seen with shadowing. No definite gallbladder wall thickenin
g is noted.

Bile ducts: Normal. Common Bile Duct measures 2 mm. 

Right Kidney: Normal

Left Kidney: Several small 9 appearing cysts are seen measuring up to 2.5 cm.

Spleen: Normal



Free fluid: None

Additional Findings: None



IMPRESSION: Cholelithiasis without acute change seen



Signer Name: Bharat Milan MD 

Signed: 5/20/2020 9:28 PM

Workstation Name: [id number]

## 2020-05-20 NOTE — PROGRESS NOTE
Assessment and Plan


Acute Respiratory distress WITH HYPOXIA


COPD


Severe pulmonary hypertension- probably combination of Group 2 and 3


-PASP 82


Hyponatremia


Acute toxic-metaboli encephalaopthy


Tobacco use disorder/Nicotine dependence


Non-ST elevation MI 


Acute on chronic congestive heart failure-systolic, EF 20-25%


Acute kidney injury secondary to vasomotor nephropathy and diuresis


Bilateral lower extremity edema


Anemia of chronic disease 


Morbid obesity














Ordered a STAT ABG- if she is hypercapnic, will start on NIPPV with oxygen 

restrictive strategies to keep O2 sats 88-90%


Stopped Furosemide


Started on steroids IV for possible AECOPD, short course with quick taper in 

view of heart failure


Accucheck q6


NPO, aspiration precautions


Place small bowel feeding tube, confirm position and initiate enteric 

nutritional support


RD consult for tube feeding  management and nutritional support


Avoid hypoglycemic, target blood gluocse 140-180mg/dL while critically ill


Avoid nephrotoxins, adjust all medications for GFR and CrCL


Trend platelets she has thrombocytopenia, if  it continues to trend down to <50%

of platelet count on admission, get HIT panel and stop heparin


Gentle IV hydration


Place central access, start vasopressor support as indicated to keep MAP >65


Mobility, off loading and skin assessment protocol to prevent pressure ulcer


Maintenance of sleep-wake cycle 


Avoid delirium


Address metabolic derangements


prn analgesia per CPOT score- Avoid narcotics


Stress ulcer  prophylaxis, start famotidine


Continue other care per attending / other consultants


Continue all other care as outlined below





Discussed with Dr. Kocherla in detail


Discussed with ICU and telemetry charge nurses to facilitate transfer


Discussed with RT at the bedside








-Supplemental oxygen wean to keep O2 sats 88-90%


-Bronchodilators- SAIDA/JULIEN and inhaled corticosteroids


-Currently on NSTEMI protocol per Cardiology


-VTE prophylaxis- on therapeutic heparin for NSTEMI


-Monitor hemoglobin trends, transfuse as indicated to keep hgB >7g/dL


-Heart failure measures per cardiology


-Nicotine withdrawal precautions


-Smoking cessation counselling


-Continue management of underlying COPD


-Chronic home medications as clinically indicated


-Will need right and left heart cath 


-Sleep apnea evaluation and treatment as outpatient





CONDITION- CRITICAL


PROGNOSIS- GUARDED


CODE STATUS- FULL CODE





Life threatening condition: Severe pulmonary HTN, acute and chronic  hypoxic 

respiratory failure on high flow oxygen, acute on chronic systolic heart 

failure, hyponatremia, symptomatic hypoglycemia


Morbidity/Mortality: High


complexity of medical decision making: High








The high probability of a clinically significant, sudden or life-threatening 

deterioration of the [respiratory, cardiovascular,neurologic, renal ] system(s) 

required my full and direct attention, intervention and personal management. The

aggregate critical care time was [33] minutes without overlap. Time includes 

spent on;





[x] Data Review and interpretation 





[x] Patient assessment and monitoring of vital signs 





[x] Documentation 





[x] Medication orders and management








Subjective


Date of service: 05/20/20


Interval history: 





Follow up for ; Acute and chronic hypoxic respriaotry failure; COPD; Severe 

pulmonary HTN; Tobacco use disorder; NSTEMI; Acute and chronic systolic heart 

failure 





Seen and examined. Overnight had RRT fro unresponsiveness and was noted to be 

hypoglycemic requiring D50.


No documented fevers, no nausea or vomiting.


She has deteriorated, she is encephalopathic- moaning and groaning, she is now 

on Vapotherm at 100% and 40L.


She is not obeying commands and is not answering my questions appropriately





Objective


                               Vital Signs - 12hr











  05/20/20 05/20/20 05/20/20





  03:13 04:00 04:05


 


Temperature 99.3 F  


 


Pulse Rate 55 L  106 H


 


Pulse Rate [   





Right Radial]   


 


Respiratory 20  





Rate   


 


Blood Pressure 113/86  


 


O2 Sat by Pulse 84 90 





Oximetry   














  05/20/20 05/20/20





  10:00 11:00


 


Temperature  


 


Pulse Rate 42 L 


 


Pulse Rate [  42 L





Right Radial]  


 


Respiratory  





Rate  


 


Blood Pressure  


 


O2 Sat by Pulse  92





Oximetry  











Constitutional: lethargic, appears uncomfortable, other (obese  woman, 

grunting and moaning)


Eyes: non-icteric


ENT: oropharynx dry


Neck: supple, no lymphadenopathy


Effort: very labored


Ascultation: Bilateral: diminished breath sounds (poor AE bilaterally), rhonchi


Cardiovascular: other (irrreggular, S1,S2)


Gastrointestinal: normoactive bowel sounds, soft, non-tender, non-distended


Integumentary: other (lower extemity edema +)


Neurologic: non-focal exam (moves all extemities), other (encephalaopthic, not 

obeying commands)


Psychiatric: other (unable to assess secondary to mental status)


CBC and BMP: 


                                 05/21/20 05:35





                                 05/21/20 05:35


ABG, PT/INR, D-dimer: 


PT/INR, D-dimer











PT  15.3 Sec. (12.2-14.9)  H  05/17/20  03:25    


 


INR  1.20  (0.87-1.13)  H  05/17/20  03:25    








Abnormal lab findings: 


                                  Abnormal Labs











  05/16/20 05/16/20 05/17/20





  22:13 22:13 00:00


 


WBC   


 


Hct  43.2 H   45.8 H


 


MCV  107 H   106 H


 


MCH  34 H   33 H


 


RDW  20.2 H   19.7 H


 


Plt Count   


 


Seg Neutrophils %  77.3 H   78.8 H


 


PT   


 


INR   


 


Heparin Anti-Xa Level   


 


Sodium   130 L 


 


Potassium   


 


Chloride   85.0 L 


 


Carbon Dioxide   35 H 


 


BUN   


 


Creatinine   


 


Glucose   112 H 


 


POC Glucose   


 


Calcium   


 


Total Bilirubin   


 


AST   


 


ALT   5 L 


 


Troponin T   


 


NT-Pro-B Natriuret Pep   3719 H 


 


Albumin   3.4 L 


 


LDL Cholesterol Direct   


 


Urine Creatinine   


 


Urine Total Protein   














  05/17/20 05/17/20 05/17/20





  00:00 00:12 03:25


 


WBC   


 


Hct   


 


MCV   


 


MCH   


 


RDW   


 


Plt Count   


 


Seg Neutrophils %   


 


PT   


 


INR   


 


Heparin Anti-Xa Level   


 


Sodium  131 L  


 


Potassium   


 


Chloride  85.3 L  


 


Carbon Dioxide  35 H  


 


BUN   


 


Creatinine   


 


Glucose  108 H  


 


POC Glucose   


 


Calcium   


 


Total Bilirubin   


 


AST   


 


ALT   


 


Troponin T   0.051 H D  0.113 H* D


 


NT-Pro-B Natriuret Pep   


 


Albumin   


 


LDL Cholesterol Direct   33 L 


 


Urine Creatinine   


 


Urine Total Protein   














  05/17/20 05/17/20 05/17/20





  03:25 03:25 03:25


 


WBC   


 


Hct   


 


MCV  107 H  


 


MCH  34 H  


 


RDW  20.1 H  


 


Plt Count   


 


Seg Neutrophils %  77.0 H  


 


PT   15.3 H 


 


INR   1.20 H 


 


Heparin Anti-Xa Level   


 


Sodium    133 L


 


Potassium   


 


Chloride    86.1 L


 


Carbon Dioxide    36 H


 


BUN   


 


Creatinine   


 


Glucose    116 H


 


POC Glucose   


 


Calcium   


 


Total Bilirubin   


 


AST   


 


ALT   


 


Troponin T   


 


NT-Pro-B Natriuret Pep   


 


Albumin   


 


LDL Cholesterol Direct   


 


Urine Creatinine   


 


Urine Total Protein   














  05/17/20 05/17/20 05/18/20





  05:29 13:51 04:45


 


WBC   


 


Hct   


 


MCV    107 H


 


MCH    34 H


 


RDW    20.1 H


 


Plt Count    136 L


 


Seg Neutrophils %   


 


PT   


 


INR   


 


Heparin Anti-Xa Level   0.14 L 


 


Sodium   


 


Potassium   


 


Chloride   


 


Carbon Dioxide   


 


BUN   


 


Creatinine   


 


Glucose   


 


POC Glucose   


 


Calcium   


 


Total Bilirubin   


 


AST   


 


ALT   


 


Troponin T  0.126 H*  


 


NT-Pro-B Natriuret Pep   


 


Albumin   


 


LDL Cholesterol Direct   


 


Urine Creatinine   


 


Urine Total Protein   














  05/18/20 05/18/20 05/18/20





  04:45 16:23 16:23


 


WBC   


 


Hct   


 


MCV   


 


MCH   


 


RDW   


 


Plt Count   


 


Seg Neutrophils %   


 


PT   


 


INR   


 


Heparin Anti-Xa Level   0.27 L 


 


Sodium  127 L   123 L


 


Potassium  5.1 H D  


 


Chloride  83.9 L  


 


Carbon Dioxide  32 H  


 


BUN   


 


Creatinine  1.3 H D  


 


Glucose  116 H  


 


POC Glucose   


 


Calcium  8.3 L  


 


Total Bilirubin   


 


AST   


 


ALT   


 


Troponin T   


 


NT-Pro-B Natriuret Pep   


 


Albumin   


 


LDL Cholesterol Direct   


 


Urine Creatinine   


 


Urine Total Protein   














  05/18/20 05/19/20 05/19/20





  18:42 09:32 09:32


 


WBC   11.4 H 


 


Hct   


 


MCV   106 H 


 


MCH   33 H 


 


RDW   19.5 H 


 


Plt Count   131 L 


 


Seg Neutrophils %   


 


PT   


 


INR   


 


Heparin Anti-Xa Level   


 


Sodium    128 L


 


Potassium   


 


Chloride    82.4 L


 


Carbon Dioxide    31 H


 


BUN    25 H


 


Creatinine    1.9 H


 


Glucose    61 L


 


POC Glucose  135 H  


 


Calcium    8.2 L


 


Total Bilirubin   


 


AST   


 


ALT   


 


Troponin T   


 


NT-Pro-B Natriuret Pep   


 


Albumin   


 


LDL Cholesterol Direct   


 


Urine Creatinine   


 


Urine Total Protein   














  05/19/20 05/19/20 05/19/20





  13:37 19:24 19:33


 


WBC   


 


Hct   


 


MCV   


 


MCH   


 


RDW   


 


Plt Count   


 


Seg Neutrophils %   


 


PT   


 


INR   


 


Heparin Anti-Xa Level  < 0.10 L  


 


Sodium   


 


Potassium   


 


Chloride   


 


Carbon Dioxide   


 


BUN   


 


Creatinine   


 


Glucose   


 


POC Glucose   < 40 L  > 500 H


 


Calcium   


 


Total Bilirubin   


 


AST   


 


ALT   


 


Troponin T   


 


NT-Pro-B Natriuret Pep   


 


Albumin   


 


LDL Cholesterol Direct   


 


Urine Creatinine   


 


Urine Total Protein   














  05/19/20 05/19/20 05/19/20





  20:01 20:10 21:03


 


WBC   


 


Hct   


 


MCV   


 


MCH   


 


RDW   


 


Plt Count   


 


Seg Neutrophils %   


 


PT   


 


INR   


 


Heparin Anti-Xa Level  < 0.10 L  


 


Sodium   124 L 


 


Potassium   5.5 H D 


 


Chloride   82.4 L 


 


Carbon Dioxide   


 


BUN   31 H 


 


Creatinine   2.1 H 


 


Glucose   212 H 


 


POC Glucose    202 H


 


Calcium   8.0 L 


 


Total Bilirubin   


 


AST   


 


ALT   


 


Troponin T   


 


NT-Pro-B Natriuret Pep   


 


Albumin   


 


LDL Cholesterol Direct   


 


Urine Creatinine   


 


Urine Total Protein   














  05/19/20 05/19/20 05/19/20





  21:53 23:18 Unknown


 


WBC   


 


Hct   


 


MCV   


 


MCH   


 


RDW   


 


Plt Count   


 


Seg Neutrophils %   


 


PT   


 


INR   


 


Heparin Anti-Xa Level   


 


Sodium   


 


Potassium   


 


Chloride   


 


Carbon Dioxide   


 


BUN   


 


Creatinine   


 


Glucose   


 


POC Glucose  177 H  203 H 


 


Calcium   


 


Total Bilirubin   


 


AST   


 


ALT   


 


Troponin T   


 


NT-Pro-B Natriuret Pep   


 


Albumin   


 


LDL Cholesterol Direct   


 


Urine Creatinine    216.1 H


 


Urine Total Protein    62 H














  05/20/20 05/20/20 05/20/20





  00:15 02:20 04:34


 


WBC   


 


Hct   


 


MCV   


 


MCH   


 


RDW   


 


Plt Count   


 


Seg Neutrophils %   


 


PT   


 


INR   


 


Heparin Anti-Xa Level   


 


Sodium    129 L


 


Potassium   


 


Chloride    83.7 L


 


Carbon Dioxide   


 


BUN    36 H


 


Creatinine    2.8 H


 


Glucose    129 H


 


POC Glucose  156 H  149 H 


 


Calcium    7.5 L


 


Total Bilirubin    2.30 H


 


AST    3636 H


 


ALT    1241 H


 


Troponin T   


 


NT-Pro-B Natriuret Pep   


 


Albumin    3.2 L


 


LDL Cholesterol Direct   


 


Urine Creatinine   


 


Urine Total Protein   














  05/20/20 05/20/20 05/20/20





  04:34 05:19 12:13


 


WBC   


 


Hct   


 


MCV   


 


MCH   


 


RDW   


 


Plt Count   


 


Seg Neutrophils %   


 


PT   


 


INR   


 


Heparin Anti-Xa Level  0.10 L  


 


Sodium   


 


Potassium   


 


Chloride   


 


Carbon Dioxide   


 


BUN   


 


Creatinine   


 


Glucose   


 


POC Glucose   115 H  69 L


 


Calcium   


 


Total Bilirubin   


 


AST   


 


ALT   


 


Troponin T   


 


NT-Pro-B Natriuret Pep   


 


Albumin   


 


LDL Cholesterol Direct   


 


Urine Creatinine   


 


Urine Total Protein

## 2020-05-20 NOTE — PROCEDURE NOTE
Date of procedure: 05/20/20


Condition: critical


Disposition: ICU





- Central Line Placement


  ** Right Femoral


Consent Obtained: emergent situation


Time Out Performed: Yes


Patient Placed on Monitor/Pulse Ox: Yes


MD Prep: mask, gown, gloves


Central Line Prep: Chlorhexidine scrub


Amount of Anesthesia Used (mls): 3


Ultrasound Used for Placement: No


Central Line Lumen Inserted: triple


Bloods Obtained for Lab: Yes


Central Line Position: good blood return, sutured in place with nyl


Dressing Applied: Tegaderm


Patient Tolerated Procedure: well, no complications


Complications: none


Additional Comments: 





Patient hypotensive without adequate peripheral access and requires pressors.  

Central line placed per hospitalist request.

## 2020-05-20 NOTE — XRAY REPORT
CHEST 1 VIEW 



INDICATION: 

AE-COPD, pulmonary HTN.



COMPARISON: 

5/16/2020



FINDINGS:

Support devices: None.



Heart: Stable cardiomegaly.

Pulmonary vessels: Redistribution of pulmonary blood flow to the upper lobes. 

Lungs/Pleura: New right basal opacification with silhouetting of the right hemidiaphragm and a portio
n of the right heart border. The left lung is relatively clear. 



Additional findings: None.



IMPRESSION:

1. Interval development of right basal atelectasis/airspace disease and small pleural effusion.

2. Cardiomegaly and pulmonary venous hypertension.

3. Minimal residual interstitial pulmonary edema.



Signer Name: Joaquin Caballero MD 

Signed: 5/20/2020 3:30 PM

Workstation Name: [id number]

## 2020-05-20 NOTE — EVENT NOTE
Date: 05/20/20


Called patient's son Mr.Stanley Sales  and discussed in detail 

patient's critical condition, poor prognosis and the advance directives /code 

status.


I answered all his questions .He requested full code status and would discuss 

with other family members and inform their decission.


Full Code status

## 2020-05-20 NOTE — PROGRESS NOTE
Assessment and Plan


Assessment and plan: 


70-year-old female with known history of COPD tension and CHF presenting to the 

emergency room today complaining of lower extremity swelling, chest pain for the

past 24 to 48 hours.  Patient is on home oxygen for COPD and she is also on 

Lasix for CHF.  She however indicates that she has been out of her Lasix for 

some time now and she has not been able to get a refill from her primary care 

physician.


Patient was admitted with acute exacerbation of COPD, acute on chronic systolic 

congestive heart failure, fluid overload, respiratory failure


Pulmonary.  Cardiology and nephrology following the patient.


Last night patient went into hypoglycemia episode, and altered level of 

consciousness, received D50, IV D10, significantly improved


Patient is requiring high flow oxygen, remains short of breath.








Assessment and plan:


--Acute hypoxic respiratory failure; on high flow oxygen


Titrate O2 sats to more than 90%, nebulizers


IV steroids, pulmonary following,ABG


Bipap a needed





--Severe pulmonary hypertension; on echo


Continue current management, pulmonary following





--Cor pulmonale/severe pulmonary hypertension;


Management per pulmonary





--Acute exacerbation of COPD; on home oxygen


Oxygen, nebulizers, IV steroids, pulmonary following





--Acute on chronic systolic congestive heart failure; EF 20 to 25%


Continue heart failure medications, input output monitoring


Low-sodium diet, fluid restriction, cardiology following





--Atrial flutter with variable conduction;


On Cardizem and sotalol and heparin drip


Sotalol discontinued due to bradycardia by cardiology





--Acute transaminitis; secondary to liver congestion


Due to cardiomyopathy.  Continue anti-failure medications


AST 3636 , ALT 1241, T bili 2.3





--Acute kidney injury; vasomotor nephropathy


Management per nephrology, avoid nephrotoxins





--Hyponatremia; mild improvement


Secondary to fluid overload, current management


Diuretic therapy, follow electrolytes, nephrology following





--Symptomatic hypoglycemia; [5/19/2020]


Received D50, D10 IV fluids, significantly improved


Closely monitor blood sugars adjust as needed





--Metabolic encephalopathy;lethergy


Due to underlying disease process


Treat the underlying cause, supportive care





--Generalized anasarca/edema


Due to cardiomyopathy, severe pulmonary hypertension


renal failure.  Treat the underlying cause





--Morbid obesity; BMI 38.6


Partly due to fluid overload


Continue supportive care





--DVT prophylaxis;


Patient is on heparin drip





--Full CODE STATUS.





Patient has multiple medical problems


Critically ill, poor prognosis





Stat ABG, transfer to ICU





Plan of care reviewed with the patient's nurse and the pulm/critical





Transfer the patient to ICU for close observation.





Critical care time 36 minutes
































History


Interval history: 


Patient seen and examined at the bedside


Patient's chart and medications reviewed


Patient is in acute distress,lethergic On high flow oxygen


Bradycardia heart rate, 55, 106, 42


Vital signs reviewed





Hospitalist Physical





- Constitutional


Vitals: 


                                        











Temp Pulse Resp BP Pulse Ox


 


 99.3 F   42 L  20   113/86   92 


 


 05/20/20 03:13  05/20/20 11:00  05/20/20 03:13  05/20/20 03:13  05/20/20 11:00











General appearance: Present: severe distress, well-nourished, obese (Morbidly 

obese), other (On high flow oxygen, lethergic)





- EENT


Eyes: Present: PERRL, EOM intact





- Neck


Neck: Present: supple, normal ROM





- Respiratory


Respiratory effort: labored


Respiratory: bilateral: diminished, rales, rhonchi, negative: wheezing





- Cardiovascular


Rhythm: regular (Bradycardia)





- Extremities


Extremities: no ischemia


Extremity abnormal: edema





- Abdominal


General gastrointestinal: soft, non-tender, non-distended, normal bowel sounds





- Integumentary


Integumentary: Present: clear, warm





- Psychiatric


Psychiatric: other (Patient is lethargic, easily awakens)





- Neurologic


Neurologic: moves all extremities





HEART Score





- HEART Score


EKG: Non-specific


Age: > 65


Risk factors: > 3 risk factors or hx of atherosclerotic disease


Troponin: 


                                        











Troponin T  0.126 ng/mL (0.00-0.029)  H*  05/17/20  05:29    











Troponin: < normal limit





- Critical Actions


Critical Actions: 4-6 pts:12-16.6% risk of adverse cardiac event. Should be 

admitted





Results





- Labs


CBC & Chem 7: 


                                 05/19/20 09:32





                                 05/20/20 04:34


Labs: 


                             Laboratory Last Values











WBC  11.4 K/mm3 (4.5-11.0)  H  05/19/20  09:32    


 


RBC  4.07 M/mm3 (3.65-5.03)   05/19/20  09:32    


 


Hgb  13.5 gm/dl (10.1-14.3)   05/19/20  09:32    


 


Hct  42.9 % (30.3-42.9)   05/19/20  09:32    


 


MCV  106 fl (79-97)  H  05/19/20  09:32    


 


MCH  33 pg (28-32)  H  05/19/20  09:32    


 


MCHC  31 % (30-34)   05/19/20  09:32    


 


RDW  19.5 % (13.2-15.2)  H  05/19/20  09:32    


 


Plt Count  131 K/mm3 (140-440)  L  05/19/20  09:32    


 


Lymph % (Auto)  16.4 % (13.4-35.0)   05/17/20  03:25    


 


Mono % (Auto)  5.8 % (0.0-7.3)   05/17/20  03:25    


 


Eos % (Auto)  0.3 % (0.0-4.3)   05/17/20  03:25    


 


Baso % (Auto)  0.5 % (0.0-1.8)   05/17/20  03:25    


 


Lymph #  1.4 K/mm3 (1.2-5.4)   05/17/20  03:25    


 


Mono #  0.5 K/mm3 (0.0-0.8)   05/17/20  03:25    


 


Eos #  0.0 K/mm3 (0.0-0.4)   05/17/20  03:25    


 


Baso #  0.0 K/mm3 (0.0-0.1)   05/17/20  03:25    


 


Seg Neutrophils %  77.0 % (40.0-70.0)  H  05/17/20  03:25    


 


Seg Neutrophils #  6.7 K/mm3 (1.8-7.7)   05/17/20  03:25    


 


PT  15.3 Sec. (12.2-14.9)  H  05/17/20  03:25    


 


INR  1.20  (0.87-1.13)  H  05/17/20  03:25    


 


APTT  29.0 Sec. (24.2-36.6)   05/17/20  03:25    


 


Heparin Anti-Xa Level  0.12 U.I./ml (0.3-0.7)  L  05/20/20  Unknown


 


ABG pCO2  86.4 mm Hg  05/20/20  15:15    


 


ABG pO2  70.1 mm Hg (80.0-90.0)  L  05/20/20  15:15    


 


ABG HCO3  31.5 mmol/L (20.0-26.0)  H  05/20/20  15:15    


 


ABG O2 Saturation  89.6 % (95.0-99.0)  L  05/20/20  15:15    


 


ABG O2 Content  16.9  (0.0-44)   05/20/20  15:15    


 


ABG Base Excess  0.6 mmol/L (-2.0-3.0)   05/20/20  15:15    


 


ABG Hemoglobin  13.7 gm/dl (12.0-16.0)   05/20/20  15:15    


 


ABG Carboxyhemoglobin  1.5 % (0.0-5.0)   05/20/20  15:15    


 


ABG Methemoglobin  0.7 % (0.0-1.5)   05/20/20  15:15    


 


Oxyhemoglobin  87.7 % (95.0-99.0)  L  05/20/20  15:15    


 


FiO2  100 %  05/20/20  15:15    


 


Sodium  129 mmol/L (137-145)  L  05/20/20  04:34    


 


Potassium  4.6 mmol/L (3.6-5.0)   05/20/20  04:34    


 


Chloride  83.7 mmol/L ()  L  05/20/20  04:34    


 


Carbon Dioxide  29 mmol/L (22-30)   05/20/20  04:34    


 


Anion Gap  21 mmol/L  05/20/20  04:34    


 


BUN  36 mg/dL (7-17)  H  05/20/20  04:34    


 


Creatinine  2.8 mg/dL (0.7-1.2)  H  05/20/20  04:34    


 


Estimated GFR  20 ml/min  05/20/20  04:34    


 


BUN/Creatinine Ratio  13 %  05/20/20  04:34    


 


Glucose  129 mg/dL ()  H  05/20/20  04:34    


 


POC Glucose  87  ()   05/20/20  15:46    


 


Calcium  7.5 mg/dL (8.4-10.2)  L  05/20/20  04:34    


 


Total Bilirubin  2.30 mg/dL (0.1-1.2)  H  05/20/20  04:34    


 


AST  3636 units/L (5-40)  H  05/20/20  04:34    


 


ALT  1241 units/L (7-56)  H  05/20/20  04:34    


 


Alkaline Phosphatase  91 units/L ()   05/20/20  04:34    


 


Total Creatine Kinase  112 units/L ()   05/18/20  16:23    


 


CK-MB (CK-2)  3.5 ng/mL (0.0-4.0)   05/18/20  16:23    


 


Troponin T  0.126 ng/mL (0.00-0.029)  H*  05/17/20  05:29    


 


NT-Pro-B Natriuret Pep  3719 pg/mL (0-900)  H  05/16/20  22:13    


 


Total Protein  6.3 g/dL (6.3-8.2)   05/20/20  04:34    


 


Albumin  3.2 g/dL (3.9-5)  L  05/20/20  04:34    


 


Albumin/Globulin Ratio  1.0 %  05/20/20  04:34    


 


Triglycerides  68 mg/dL (2-149)   05/17/20  00:12    


 


Cholesterol  83 mg/dL ()   05/17/20  00:12    


 


LDL Cholesterol Direct  33 mg/dL ()  L  05/17/20  00:12    


 


HDL Cholesterol  49 mg/dL (40-59)   05/17/20  00:12    


 


Cholesterol/HDL Ratio  1.69 %  05/17/20  00:12    


 


Urine Color  Mandi  (Yellow)   05/19/20  Unknown


 


Urine Turbidity  Slightly-cloudy  (Clear)   05/19/20  Unknown


 


Urine pH  5.0  (5.0-7.0)   05/19/20  Unknown


 


Ur Specific Gravity  1.014  (1.003-1.030)   05/19/20  Unknown


 


Urine Protein  30 mg/dl mg/dL (Negative)   05/19/20  Unknown


 


Urine Glucose (UA)  Neg mg/dL (Negative)   05/19/20  Unknown


 


Urine Ketones  Neg mg/dL (Negative)   05/19/20  Unknown


 


Urine Blood  Neg  (Negative)   05/19/20  Unknown


 


Urine Nitrite  Neg  (Negative)   05/19/20  Unknown


 


Urine Bilirubin  Neg  (Negative)   05/19/20  Unknown


 


Urine Urobilinogen  2.0 mg/dL (<2.0)   05/19/20  Unknown


 


Ur Leukocyte Esterase  Sm  (Negative)   05/19/20  Unknown


 


Urine WBC (Auto)  2.0 /HPF (0.0-6.0)   05/19/20  Unknown


 


Urine RBC (Auto)  2.0 /HPF (0.0-6.0)   05/19/20  Unknown


 


U Epithel Cells (Auto)  2.0 /HPF (0-13.0)   05/19/20  Unknown


 


Urine Bacteria (Auto)  1+ /HPF (Negative)   05/19/20  Unknown


 


Urine Osmolality  319 Mosm/kg  05/19/20  Unknown


 


Urine Creatinine  216.1 mg/dL (0.1-20.0)  H  05/19/20  Unknown


 


Protein/Creatinin Ratio  0.29   05/19/20  Unknown


 


Urine Sodium  10 mmol/L  05/19/20  Unknown


 


Urine Total Protein  62 mg/dL (5-11.8)  H  05/19/20  Unknown














- Diagnostic Impressions


Diagnostic Impressions: 








Echocardiogram  05/16/20 23:50


Transthoracic Echocardiogram


 


Indication:


CHF


BP:           84/59


HR:           117


 


Conclusions


 *4-chamber dilated cardiomyopathy.


 *The right heart chambers are both severely dilated. There is


moderately severe tricuspid regurgitation, and severe pulmonary HTN,


with PASP of 82mmHg.


 *Left heart chambers are moderately dilated.


 *Global left ventricular systolic function is severely decreased.


 *The estimated ejection fraction is 20-25%. 


 *There is mild mitral regurgitation. 


 


Findings     


Left Ventricle:


The left ventricular chamber size is moderately dilated. There is no


left ventricular hypertrophy. Severe global hypokinesis of the left


ventricle is observed. Global left ventricular systolic function is


severely decreased. The estimated ejection fraction is 20-25%.  Abnormal


left ventricular diastolic function is observed. 


 


Left Atrium:


The left atrium is moderate to severely dilated.  


 


Right Ventricle:


The right ventricle is severely dilated.  The right ventricular global


systolic function is severely reduced. 


 


Right Atrium:


The right atrial cavity size is severely dilated. 


 


Aortic Valve:


The aortic valve leaflets are moderately thickened. There is trace of


aortic regurgitation. There is no evidence of aortic stenosis. 


 


Mitral Valve:


The mitral valve leaflets are moderately thickened. There is mild mitral


regurgitation.  There is no evidence of mitral stenosis. 


 


Tricuspid Valve:


The tricuspid valve leaflets are normal.  There is moderate to severe


tricuspid regurgitation. The right ventricular systolic pressure is


calculated at 82 mmHg.  There is evidence of severe pulmonary


hypertension. There is no tricuspid stenosis. 


 


Pulmonic Valve:


The pulmonic valve appears normal. There is mild pulmonic regurgitation.


 


 


Pericardium:


A trivial pericardial effusion is visualized. 


 


Aorta:


There is no dilatation of the ascending aorta. There is no dilatation of


the aortic root. 


 


Venous:


The inferior vena cava is dilated.  There is less than 50% respiratory


change in the inferior vena cava dimension. 


 


Measurements     


Chambers 2D


Name                    Value         Normal Range            


IVSd (2D)               1.56 cm       (0.6 - 1.1)             


LVPWd (2D)              1.56 cm       (0.6 - 1.1)             


LVIDd (2D)              5.05 cm       (3.7 - 5.6)             


LVIDs (2D)              4.47 cm       (2 - 3.8)               


LV FS (2D)              11.64 %       -                        


EF Teichholz (2D)       25.1 %        -                        


Ao root diameter (2D)   3.06 cm       (2 - 3.7)               


 


Volumes/Mass


Name                    Value         Normal Range            


LA ESV SP 4CH (A/L)     76.11 ml      -                        


LA ESV SP 2CH (A/L)     48.94 ml      -                        


LA ESV BP (A/L)         62.38 ml      -                        


LA ESV SP 4CH (MOD)     70.31 ml      -                        


LA ESV SP 2CH (MOD)     46.04 ml      -                        


LA ESV BP (MOD)         58.03 ml      -                        


LA ESV BP (MOD) index   28.87 ml/m2   -                        


LV EDV SP 4CH (MOD)     81.51 ml      -                        


LV ESV SP 4CH (MOD)     46.31 ml      -                        


EF SP 4CH (MOD)         43.18 %       -                        


LV EDV SP 2CH (MOD)     84.04 ml      -                        


LV ESV SP 2CH (MOD)     60.72 ml      -                        


EF SP 2CH (MOD)         27.76 %       -                        


LV EDV BP               84.52 ml      -                        


LV ESV BP               53.64 ml      -                        


BP EF (MOD)             36.54 %       -                        


 


Aortic Valve


Name                    Value         Normal Range            


LVOT diameter           2.01 cm       -                        


LVOT Vmax               0.64 m/sec    -                        


LVOT VTI                8.01 cm       -                        


LVOT peak gradient      1.62 mmHg     -                        


LVOT mean gradient      0.79 mmHg     -                        


SV LVOT                 25.42 ml      -                        


Ascending Ao            2.81 cm       -                        


 


Tricuspid Valve


Name                    Value         Normal Range            


TR Vmax                 4.09 m/sec    -                        


TR peak gradient        66.78 mmHg    -                        


RAP                     15 mmHg       -                        


RVSP                    82 mmHg       -                        


 


Pulmonic Valve/Qp:Qs


Name                    Value         Normal Range            


PV Vmax                 0.87 m/sec    -                        


PV peak gradient        3.06 mmHg     -                        


ND end-diastolic Vmax   2.66 m/sec    -                        


RVOT Vmax               0.7 m/sec     -                        


RVOT peak gradient      1.96 mmHg     -                        


PV acceleration time    53.28 msec    -                        


 


Electronically signed by: Shade Vela MD on 05/17/2020 15:34:04











Min/IV: 


                                        





Voiding Method                   External Female Catheter


IV Catheter Type [Right          INT / Saline Lock


Forearm]                         


IV Catheter Type [Left Wrist]    INT / Saline Lock


IV Catheter Type [Left Forearm   INT / Saline Lock


]                                











Active Medications





- Current Medications


Current Medications: 














Generic Name Dose Route Start Last Admin





  Trade Name Freq  PRN Reason Stop Dose Admin


 


Acetaminophen  650 mg  05/16/20 23:45 





  Tylenol  PO  





  Q4H PRN  





  Pain MILD(1-3)/Fever >100.5/HA  


 


Albuterol  2.5 mg  05/17/20 14:57 





  Proventil  IH  





  Q4HRT PRN  





  Shortness Of Breath  


 


Albuterol/Ipratropium  1 ampul  05/17/20 20:00  05/20/20 14:58





  Duoneb *Not For Prn Use*  IH   1 ampul





  TIDRT AMANDA   Administration


 


Lipase/Protease/Amylase  1 each  05/20/20 15:33 





  Pancreaze Dr 10,500 Unit  FEEDTUBE  





  PRN PRN  





  For Clogged Feeding Tube  


 


Arformoterol Tartrate  15 mcg  05/17/20 20:00  05/20/20 09:25





  Brovana Nebu  IH   15 mcg





  Q12HRT AMANDA   Administration


 


Aspirin  325 mg  05/17/20 10:00  05/20/20 10:42





  Ecotrin  PO   325 mg





  QDAY AMANDA   Administration


 


Atorvastatin Calcium  40 mg  05/17/20 22:00  05/19/20 23:56





  Lipitor  PO   Not Given





  QHS AMANDA  


 


Budesonide  0.5 mg  05/17/20 20:00  05/20/20 09:25





  Pulmicort  IH   0.5 mg





  Q12HRT AMANDA   Administration


 


Diltiazem HCl  30 mg  05/17/20 18:00  05/20/20 07:40





  Cardizem  PO   Not Given





  Q6HR AMANDA  


 


Heparin Sodium/Sodium Chloride  25,000 unit in 500 mls @ 20 mls/hr  05/17/20 

06:00  05/20/20 15:25





  Heparin/ 0.45% Nacl-25,000 Unit/500 Ml  IV   1,650 units/hr





  TITRATE AMANDA   33 mls/hr





    Titration





  Protocol  





  1,000 UNITS/HR  


 


Dextrose  1,000 mls @ 42 mls/hr  05/19/20 21:00 





  D10w  IV  





  AS DIRECT AMANDA  


 


Magnesium Hydroxide  30 ml  05/16/20 23:45 





  Milk Of Magnesia  PO  





  Q4H PRN  





  Constipation  


 


Methylprednisolone Sodium Succinate  40 mg  05/20/20 15:00 





  Solu-Medrol  IV  





  Q8HR AMANDA  


 


Morphine Sulfate  2 mg  05/16/20 23:45 





  Morphine  IV  





  Q5MIN PRN  





  Chest Pain unrelieved by NTG  


 


Nitroglycerin  0.4 mg  05/16/20 23:45 





  Nitrostat  SL  





  .Q5MIN PRN  





  Chest Pain  


 


Ondansetron HCl  4 mg  05/16/20 23:45 





  Zofran  IV  





  Q8H PRN  





  Nausea And Vomiting  


 


Simple Syrup  15 ml  05/20/20 15:33 





  Simple Syrup  FEEDTUBE  





  PRN PRN  





  Hypoglycemia  


 


Simple Syrup  30 ml  05/20/20 15:33 





  Simple Syrup  FEEDTUBE  





  PRN PRN  





  Hypoglycemia  


 


Sodium Bicarbonate  325 mg  05/20/20 15:33 





  Sodium Bicarbonate  FEEDTUBE  





  PRN PRN  





  For Clogged Feeding Tube  


 


Sodium Chloride  10 ml  05/17/20 10:00  05/20/20 10:42





  Sodium Chloride Flush Syringe 10 Ml  IV   10 ml





  BID AMANDA   Administration


 


Sodium Chloride  10 ml  05/16/20 23:45 





  Sodium Chloride Flush Syringe 10 Ml  IV  





  PRN PRN  





  LINE FLUSH  














Nutrition/Malnutrition Assess





- Dietary Evaluation


Nutrition/Malnutrition Findings: 


                                        





Nutrition Notes                                            Start:  05/20/20 

15:23


Freq:                                                      Status: Active       




Protocol:                                                                       




 Document     05/20/20 15:24  LM  (Rec: 05/20/20 15:33  LM  [id number])


 Nutrition Notes


     Need for Assessment generated from:         MD Order


     Initial or Follow up                        Assessment


     Current Diagnosis                           Acute Kidney Injury,COPD,


                                                 Hypertension,Heart Failure


     Other Pertinent Diagnosis                   chest pain, volume overload


     Current Diet                                cardiac


     Labs/Tests                                  Na 129


                                                 BUN 36


                                                 Cr 2.8


                                                 


                                                 Bili 2.3


     Pertinent Medications                       Reviewed


     Height                                      5 ft


     Weight                                      89.7 kg


     Ideal Body Weight (kg)                      45.45


     BMI                                         38.6


     Weight Status                               Obese


     Subjective/Other Information                MD consult for TF. Pt in


                                                 restraints and restlss per RN.


                                                 Pt s/p code met (yesterday).


     Burn                                        Absent


     Trauma                                      Absent


     Minimum of two criteria                     No physical signs of


                                                 malnutrition


     


      Nutrition Diagnosis                        Inadequate oral intake


      Etiology                                   SOB


      As Evidenced by Signs and Symptoms         pt requiring TF


     Is patient on ventilator?                   No


     Is Patient Ambulatory and/or Out of Bed     No


     REE-([facility]-confined to bed)      1611.840


     Kcal/Kg value to use for calculation        14


     Approximate Energy Requirements Using       1256


      kcal/Kg                                    


     Calculation Used for Recommendations        Kcal/kg


     Additional Notes                            Protein: 54-82g (0.8-1.2g/kg


                                                 using AdjBW 68kg)


                                                 Fluid 1ml/kcal or per MD


 Nutrition Intervention


     Change Diet Order:                          TF


     Nutrition Support:                          Nepro 1.8 at 30ml/hr


                                                 Flush 50ml q4h for


                                                 hyponatremia


                                                 Flush 130ml q4h once


                                                 hyponatremia resolves


     Kcal                                        1,296


     Protein (gm)                                58


     Fluid (mL)                                  523


     Goal #1                                     TF start/tolerance


     Anticipated Discharge Needs:                unable to determine at this


                                                 time


     Follow-Up By:                               05/22/20


     Additional Comments                         F/U for TF start/tolerance, Na

## 2020-05-21 LAB
ALBUMIN SERPL-MCNC: 3.3 G/DL (ref 3.9–5)
ALT SERPL-CCNC: 1547 UNITS/L (ref 7–56)
BAND NEUTROPHILS # (MANUAL): 0 K/MM3
BUN SERPL-MCNC: 49 MG/DL (ref 7–17)
BUN/CREAT SERPL: 14 %
CALCIUM SERPL-MCNC: 6.7 MG/DL (ref 8.4–10.2)
HCO3 BLDA-SCNC: 29.2 MMOL/L (ref 20–26)
HCT VFR BLD CALC: 42.6 % (ref 30.3–42.9)
HEMOLYSIS INDEX: 7
HGB BLD-MCNC: 13.5 GM/DL (ref 10.1–14.3)
MCHC RBC AUTO-ENTMCNC: 32 % (ref 30–34)
MCV RBC AUTO: 107 FL (ref 79–97)
MYELOCYTES # (MANUAL): 0 K/MM3
PCO2 BLDA: 71.5 MM HG
PH BLDA: 7.23 PH UNITS (ref 7.35–7.45)
PLATELET # BLD: 56 K/MM3 (ref 140–440)
PO2 BLDA: 79.8 MM HG (ref 80–90)
PROMYELOCYTES # (MANUAL): 0 K/MM3
RBC # BLD AUTO: 4 M/MM3 (ref 3.65–5.03)
TOTAL CELLS COUNTED BLD: 100

## 2020-05-21 PROCEDURE — 5A09357 ASSISTANCE WITH RESPIRATORY VENTILATION, LESS THAN 24 CONSECUTIVE HOURS, CONTINUOUS POSITIVE AIRWAY PRESSURE: ICD-10-PCS | Performed by: INTERNAL MEDICINE

## 2020-05-21 RX ADMIN — IPRATROPIUM BROMIDE AND ALBUTEROL SULFATE SCH AMPUL: .5; 3 SOLUTION RESPIRATORY (INHALATION) at 13:28

## 2020-05-21 RX ADMIN — BUDESONIDE SCH MG: 0.5 INHALANT RESPIRATORY (INHALATION) at 08:09

## 2020-05-21 RX ADMIN — FAMOTIDINE SCH MG: 10 INJECTION, SOLUTION INTRAVENOUS at 10:48

## 2020-05-21 RX ADMIN — METHYLPREDNISOLONE SODIUM SUCCINATE SCH MG: 40 INJECTION, POWDER, FOR SOLUTION INTRAMUSCULAR; INTRAVENOUS at 10:48

## 2020-05-21 RX ADMIN — NOREPINEPHRINE BITARTRATE SCH MLS/HR: 16 INJECTION, SOLUTION INTRAVENOUS at 03:15

## 2020-05-21 RX ADMIN — BUDESONIDE SCH MG: 0.5 INHALANT RESPIRATORY (INHALATION) at 20:35

## 2020-05-21 RX ADMIN — ARFORMOTEROL TARTRATE SCH MCG: 15 SOLUTION RESPIRATORY (INHALATION) at 20:35

## 2020-05-21 RX ADMIN — IPRATROPIUM BROMIDE AND ALBUTEROL SULFATE SCH AMPUL: .5; 3 SOLUTION RESPIRATORY (INHALATION) at 20:35

## 2020-05-21 RX ADMIN — ARFORMOTEROL TARTRATE SCH MCG: 15 SOLUTION RESPIRATORY (INHALATION) at 08:09

## 2020-05-21 RX ADMIN — NOREPINEPHRINE BITARTRATE SCH MLS/HR: 16 INJECTION, SOLUTION INTRAVENOUS at 09:50

## 2020-05-21 RX ADMIN — DEXTROSE AND SODIUM CHLORIDE SCH MLS/HR: 5; .9 INJECTION, SOLUTION INTRAVENOUS at 10:50

## 2020-05-21 RX ADMIN — METHYLPREDNISOLONE SODIUM SUCCINATE SCH MG: 40 INJECTION, POWDER, FOR SOLUTION INTRAMUSCULAR; INTRAVENOUS at 07:00

## 2020-05-21 RX ADMIN — HEPARIN SODIUM SCH MLS/HR: 5000 INJECTION, SOLUTION INTRAVENOUS at 00:48

## 2020-05-21 RX ADMIN — Medication SCH ML: at 10:48

## 2020-05-21 RX ADMIN — METHYLPREDNISOLONE SODIUM SUCCINATE SCH MG: 40 INJECTION, POWDER, FOR SOLUTION INTRAMUSCULAR; INTRAVENOUS at 21:53

## 2020-05-21 RX ADMIN — ASPIRIN SCH: 325 TABLET, COATED ORAL at 14:11

## 2020-05-21 RX ADMIN — IPRATROPIUM BROMIDE AND ALBUTEROL SULFATE SCH AMPUL: .5; 3 SOLUTION RESPIRATORY (INHALATION) at 08:09

## 2020-05-21 RX ADMIN — Medication SCH ML: at 21:53

## 2020-05-21 NOTE — PROGRESS NOTE
Assessment and Plan








Acute hypoxemic and hypercapnic respiratory failure


COPD


Severe pulmonary hypertension


Hyponatremia


Acute toxic-metabolic encephalaopthy


Tobacco use disorder/Nicotine dependence


Non-ST elevation MI 


Acute on chronic congestive heart failure-systolic, EF 20-25%


Acute kidney injury secondary to vasomotor nephropathy and diuresis


Bilateral lower extremity edema


Anemia of chronic disease 


Morbid obesity





- keep on continuous BIPAP X 24 more hours then reassess (no skin breakdown and 

pCO2 improving)


- continue enteral nutrition as tolerated


- continue aspiration precautions


- continue supplemental oxygen as needed to keep O2 sat's > 92%


- continue bronchodilators (SAIDA & LABA) with pulmonary hygiene per RT


- continue systemic steroids with taper


- continue inhaled corticosteroids


- empiric CAP AB's therapy with Levaquin


- PT/OT as tolerated


- mobility protocols for pressure ulcer prophylaxis


- accuchecks with glycemic control per SSI for target BG < 140-180 mg/dl


- soft restraints as pulling at NGT 


- NSTEMI per cardiology team


- Avoid nephrotoxins, adjust all medications for GFR and CrCL 


- heparin stopped re: thrombocytopenia


- HIT assay ordered


- ACS work-up & optimizatio of CHF management per cardiology


- wean levophed for target MAP >/= 65 mmHg


- mobility protocols for pressure ulcer prophylaxis


- avoid benzodiazepines acute re: delirium issues


- prn analgesia per CPOT score


- continue GI & VTE prophylaxis


- Flu & pneumovax addressed per protocol


- continue other care per attending / other consultants





.. re-evaluate in am & prn





CONDITION- CRITICAL


PROGNOSIS- GUARDED


CODE STATUS- FULL CODE





Life threatening condition: Severe pulmonary HTN, acute and chronic  hypoxic 

respiratory failure on high flow oxygen, acute on chronic systolic heart 

failure, hyponatremia, symptomatic hypoglycemia


Morbidity/Mortality: High


complexity of medical decision making: High





The high probability of a clinically significant, sudden or life-threatening 

deterioration of the [respiratory, cardiovascular,neurologic, renal] system(s) 

required my full and direct attention, intervention and personal management. The

aggregate critical care time was [35] minutes without overlap. Time includes 

spent on;





[x] Data Review and interpretation 





[x] Patient assessment and monitoring of vital signs 





[x] Documentation 





[x] Medication orders and management








Subjective


Date of service: 05/21/20


Principal diagnosis: Ac hypoxemic and hypercapnic resp failure; AE-COPD; NSTEMI;

 MILAN; HFrEF


Interval history: 





Patient is seen today for: Acute hypoxemic and hypercapnic respiratory failure; 

AE-COPD; Severe Pulm HTN; Acute toxic-metabolic encephalaopthy; NSTEMI; MILAN; 

HFrEF





Seen and examined at bedside; 24hour events reviewed; nursing and respiratory 

care staff consulted; no adverse overnight events reported to me; resting 

peacefully in bed; remains on continuous BIPAP wityh improving hypercapnia; 

still with hypersomnolence but tolerating enteral nutrition and no emesis





Objective


                               Vital Signs - 12hr











  05/21/20 05/21/20 05/21/20





  02:15 02:30 02:45


 


Temperature   


 


Pulse Rate 110 H 110 H 108 H


 


Pulse Rate [   





Bilateral   





Throughout]   


 


Pulse Rate [   





From Monitor]   


 


Respiratory 27 H 28 H 28 H





Rate   


 


Respiratory   





Rate [Bilateral   





Throughout]   


 


Blood Pressure 96/67 86/64 91/66


 


O2 Sat by Pulse 93 93 91





Oximetry   














  05/21/20 05/21/20 05/21/20





  03:00 03:15 03:30


 


Temperature   


 


Pulse Rate 110 H 110 H 110 H


 


Pulse Rate [   





Bilateral   





Throughout]   


 


Pulse Rate [   





From Monitor]   


 


Respiratory 28 H 28 H 17





Rate   


 


Respiratory   





Rate [Bilateral   





Throughout]   


 


Blood Pressure 91/66 96/72 96/72


 


O2 Sat by Pulse 90 91 92





Oximetry   














  05/21/20 05/21/20 05/21/20





  03:46 04:00 04:15


 


Temperature  96.9 F L 


 


Pulse Rate 109 H 110 H 110 H


 


Pulse Rate [   





Bilateral   





Throughout]   


 


Pulse Rate [  110 H 





From Monitor]   


 


Respiratory 13 28 H 25 H





Rate   


 


Respiratory   





Rate [Bilateral   





Throughout]   


 


Blood Pressure 64/48 92/66 84/60


 


O2 Sat by Pulse 90 93 





Oximetry   














  05/21/20 05/21/20 05/21/20





  04:30 04:40 04:46


 


Temperature   


 


Pulse Rate 110 H 110 H 110 H


 


Pulse Rate [   





Bilateral   





Throughout]   


 


Pulse Rate [   





From Monitor]   


 


Respiratory 28 H 28 H 21





Rate   


 


Respiratory   





Rate [Bilateral   





Throughout]   


 


Blood Pressure 90/51 92/65 92/65


 


O2 Sat by Pulse 93 93 95





Oximetry   














  05/21/20 05/21/20 05/21/20





  05:00 05:15 05:30


 


Temperature   


 


Pulse Rate 110 H 110 H 110 H


 


Pulse Rate [   





Bilateral   





Throughout]   


 


Pulse Rate [   





From Monitor]   


 


Respiratory 22 27 H 26 H





Rate   


 


Respiratory   





Rate [Bilateral   





Throughout]   


 


Blood Pressure 88/63 92/64 101/71


 


O2 Sat by Pulse 94 95 





Oximetry   














  05/21/20 05/21/20 05/21/20





  05:45 06:00 06:15


 


Temperature   


 


Pulse Rate 111 H 110 H 111 H


 


Pulse Rate [   





Bilateral   





Throughout]   


 


Pulse Rate [   





From Monitor]   


 


Respiratory 29 H 22 28 H





Rate   


 


Respiratory   





Rate [Bilateral   





Throughout]   


 


Blood Pressure 93/75 99/73 91/66


 


O2 Sat by Pulse   95





Oximetry   














  05/21/20 05/21/20 05/21/20





  06:30 06:45 07:00


 


Temperature   


 


Pulse Rate 110 H 110 H 107 H


 


Pulse Rate [   





Bilateral   





Throughout]   


 


Pulse Rate [   





From Monitor]   


 


Respiratory 28 H 28 H 29 H





Rate   


 


Respiratory   





Rate [Bilateral   





Throughout]   


 


Blood Pressure 95/57 104/68 94/59


 


O2 Sat by Pulse 96 94 95





Oximetry   














  05/21/20 05/21/20 05/21/20





  07:15 07:30 07:45


 


Temperature   


 


Pulse Rate 111 H 111 H 111 H


 


Pulse Rate [   





Bilateral   





Throughout]   


 


Pulse Rate [   





From Monitor]   


 


Respiratory 28 H 28 H 28 H





Rate   


 


Respiratory   





Rate [Bilateral   





Throughout]   


 


Blood Pressure 97/68 97/68 98/68


 


O2 Sat by Pulse  97 92





Oximetry   














  05/21/20 05/21/20 05/21/20





  08:00 08:09 08:15


 


Temperature 97.9 F  


 


Pulse Rate 98 H  108 H


 


Pulse Rate [  112 H 





Bilateral   





Throughout]   


 


Pulse Rate [ 104 H  





From Monitor]   


 


Respiratory 28 H  28 H





Rate   


 


Respiratory  24 





Rate [Bilateral   





Throughout]   


 


Blood Pressure 91/61  80/62


 


O2 Sat by Pulse 97  92





Oximetry   














  05/21/20 05/21/20 05/21/20





  08:30 08:45 08:46


 


Temperature   


 


Pulse Rate 92 H 98 H 100 H


 


Pulse Rate [   





Bilateral   





Throughout]   


 


Pulse Rate [   





From Monitor]   


 


Respiratory 28 H 28 H 28 H





Rate   


 


Respiratory   





Rate [Bilateral   





Throughout]   


 


Blood Pressure 105/63 91/61 79/53


 


O2 Sat by Pulse 92 96 97





Oximetry   














  05/21/20 05/21/20 05/21/20





  09:00 09:15 09:30


 


Temperature   


 


Pulse Rate 105 H 111 H 106 H


 


Pulse Rate [   





Bilateral   





Throughout]   


 


Pulse Rate [   





From Monitor]   


 


Respiratory 29 H 28 H 28 H





Rate   


 


Respiratory   





Rate [Bilateral   





Throughout]   


 


Blood Pressure 91/63 97/70 93/68


 


O2 Sat by Pulse  96 99





Oximetry   














  05/21/20 05/21/20 05/21/20





  09:45 10:00 10:15


 


Temperature   


 


Pulse Rate 97 H 95 H 103 H


 


Pulse Rate [   





Bilateral   





Throughout]   


 


Pulse Rate [   





From Monitor]   


 


Respiratory 28 H 28 H 28 H





Rate   


 


Respiratory   





Rate [Bilateral   





Throughout]   


 


Blood Pressure 91/67 97/66 92/70


 


O2 Sat by Pulse 95 95 95





Oximetry   














  05/21/20 05/21/20 05/21/20





  10:30 10:45 11:00


 


Temperature   


 


Pulse Rate 96 H 111 H 97 H


 


Pulse Rate [   





Bilateral   





Throughout]   


 


Pulse Rate [   





From Monitor]   


 


Respiratory 27 H 28 H 22





Rate   


 


Respiratory   





Rate [Bilateral   





Throughout]   


 


Blood Pressure 98/71 103/64 92/62


 


O2 Sat by Pulse 99 97 100





Oximetry   














  05/21/20 05/21/20 05/21/20





  11:15 11:30 11:37


 


Temperature   


 


Pulse Rate 103 H 96 H 97 H


 


Pulse Rate [   





Bilateral   





Throughout]   


 


Pulse Rate [   





From Monitor]   


 


Respiratory 22 23 28 H





Rate   


 


Respiratory   





Rate [Bilateral   





Throughout]   


 


Blood Pressure 95/65 104/71 92/62


 


O2 Sat by Pulse 96 95 100





Oximetry   














  05/21/20 05/21/20 05/21/20





  11:46 12:00 12:15


 


Temperature  97.9 F 


 


Pulse Rate 105 H 94 H 100 H


 


Pulse Rate [   





Bilateral   





Throughout]   


 


Pulse Rate [   





From Monitor]   


 


Respiratory 28 H 28 H 28 H





Rate   


 


Respiratory   





Rate [Bilateral   





Throughout]   


 


Blood Pressure 84/61 94/64 86/59


 


O2 Sat by Pulse 95 95 95





Oximetry   














  05/21/20 05/21/20 05/21/20





  12:30 12:45 13:00


 


Temperature   


 


Pulse Rate 91 H 97 H 98 H


 


Pulse Rate [   





Bilateral   





Throughout]   


 


Pulse Rate [   





From Monitor]   


 


Respiratory 28 H 28 H 28 H





Rate   


 


Respiratory   





Rate [Bilateral   





Throughout]   


 


Blood Pressure 95/66 84/61 95/64


 


O2 Sat by Pulse 96 95 





Oximetry   














  05/21/20 05/21/20





  13:16 13:28


 


Temperature  


 


Pulse Rate 98 H 


 


Pulse Rate [  93 H





Bilateral  





Throughout]  


 


Pulse Rate [  





From Monitor]  


 


Respiratory 28 H 





Rate  


 


Respiratory  28 H





Rate [Bilateral  





Throughout]  


 


Blood Pressure 84/60 


 


O2 Sat by Pulse 96 





Oximetry  











Constitutional: appears uncomfortable, other (obese  woman, grunting 

and moaning)


Eyes: non-icteric


ENT: oropharynx dry


Neck: supple, no lymphadenopathy


Effort: mildly labored


Ascultation: Bilateral: diminished breath sounds (poor AE bilaterally), rhonchi,

other (prolonged expiratory phase)


Percussion: Bilateral: not dull


Cardiovascular: regular rate and rhythm, other (irrreggular, S1,S2)


Gastrointestinal: normoactive bowel sounds, soft, non-tender, non-distended


Integumentary: rash, other (lower extemity edema +)


Extremities: no cyanosis, pink and warm, pulses normal, no ischemia or petechiae

, edema


Neurologic: non-focal exam (moves all extemities), pupils equal and round, CN 

II-XII normal, other (lethargic)


Psychiatric: other (unable to assess secondary to mental status)


CBC and BMP: 


                                 05/22/20 04:30





                                 05/22/20 04:30


ABG, PT/INR, D-dimer: 


ABG











ABG pH  7.230 pH Units (7.350-7.450)  L  05/21/20  00:08    


 


ABG pCO2  71.5 mm Hg  05/21/20  00:08    


 


ABG pO2  79.8 mm Hg (80.0-90.0)  L  05/21/20  00:08    


 


ABG O2 Saturation  94.3 % (95.0-99.0)  L  05/21/20  00:08    





PT/INR, D-dimer











PT  15.3 Sec. (12.2-14.9)  H  05/17/20  03:25    


 


INR  1.20  (0.87-1.13)  H  05/17/20  03:25    








Abnormal lab findings: 


                                  Abnormal Labs











  05/16/20 05/16/20 05/17/20





  22:13 22:13 00:00


 


WBC   


 


Hct  43.2 H   45.8 H


 


MCV  107 H   106 H


 


MCH  34 H   33 H


 


RDW  20.2 H   19.7 H


 


Plt Count   


 


Seg Neutrophils %  77.3 H   78.8 H


 


Seg Neuts % (Manual)   


 


Lymphocytes % (Manual)   


 


Nucleated RBC %   


 


Seg Neutrophils # Man   


 


Lymphocytes # (Manual)   


 


PT   


 


INR   


 


Heparin Anti-Xa Level   


 


ABG pH   


 


ABG pO2   


 


ABG HCO3   


 


ABG O2 Saturation   


 


Oxyhemoglobin   


 


Sodium   130 L 


 


Potassium   


 


Chloride   85.0 L 


 


Carbon Dioxide   35 H 


 


BUN   


 


Creatinine   


 


Glucose   112 H 


 


POC Glucose   


 


Calcium   


 


Phosphorus   


 


Total Bilirubin   


 


AST   


 


ALT   5 L 


 


Troponin T   


 


NT-Pro-B Natriuret Pep   3719 H 


 


Total Protein   


 


Albumin   3.4 L 


 


LDL Cholesterol Direct   


 


Urine Creatinine   


 


Urine Total Protein   














  05/17/20 05/17/20 05/17/20





  00:00 00:12 03:25


 


WBC   


 


Hct   


 


MCV   


 


MCH   


 


RDW   


 


Plt Count   


 


Seg Neutrophils %   


 


Seg Neuts % (Manual)   


 


Lymphocytes % (Manual)   


 


Nucleated RBC %   


 


Seg Neutrophils # Man   


 


Lymphocytes # (Manual)   


 


PT   


 


INR   


 


Heparin Anti-Xa Level   


 


ABG pH   


 


ABG pO2   


 


ABG HCO3   


 


ABG O2 Saturation   


 


Oxyhemoglobin   


 


Sodium  131 L  


 


Potassium   


 


Chloride  85.3 L  


 


Carbon Dioxide  35 H  


 


BUN   


 


Creatinine   


 


Glucose  108 H  


 


POC Glucose   


 


Calcium   


 


Phosphorus   


 


Total Bilirubin   


 


AST   


 


ALT   


 


Troponin T   0.051 H D  0.113 H* D


 


NT-Pro-B Natriuret Pep   


 


Total Protein   


 


Albumin   


 


LDL Cholesterol Direct   33 L 


 


Urine Creatinine   


 


Urine Total Protein   














  05/17/20 05/17/20 05/17/20





  03:25 03:25 03:25


 


WBC   


 


Hct   


 


MCV  107 H  


 


MCH  34 H  


 


RDW  20.1 H  


 


Plt Count   


 


Seg Neutrophils %  77.0 H  


 


Seg Neuts % (Manual)   


 


Lymphocytes % (Manual)   


 


Nucleated RBC %   


 


Seg Neutrophils # Man   


 


Lymphocytes # (Manual)   


 


PT   15.3 H 


 


INR   1.20 H 


 


Heparin Anti-Xa Level   


 


ABG pH   


 


ABG pO2   


 


ABG HCO3   


 


ABG O2 Saturation   


 


Oxyhemoglobin   


 


Sodium    133 L


 


Potassium   


 


Chloride    86.1 L


 


Carbon Dioxide    36 H


 


BUN   


 


Creatinine   


 


Glucose    116 H


 


POC Glucose   


 


Calcium   


 


Phosphorus   


 


Total Bilirubin   


 


AST   


 


ALT   


 


Troponin T   


 


NT-Pro-B Natriuret Pep   


 


Total Protein   


 


Albumin   


 


LDL Cholesterol Direct   


 


Urine Creatinine   


 


Urine Total Protein   














  05/17/20 05/17/20 05/18/20





  05:29 13:51 04:45


 


WBC   


 


Hct   


 


MCV    107 H


 


MCH    34 H


 


RDW    20.1 H


 


Plt Count    136 L


 


Seg Neutrophils %   


 


Seg Neuts % (Manual)   


 


Lymphocytes % (Manual)   


 


Nucleated RBC %   


 


Seg Neutrophils # Man   


 


Lymphocytes # (Manual)   


 


PT   


 


INR   


 


Heparin Anti-Xa Level   0.14 L 


 


ABG pH   


 


ABG pO2   


 


ABG HCO3   


 


ABG O2 Saturation   


 


Oxyhemoglobin   


 


Sodium   


 


Potassium   


 


Chloride   


 


Carbon Dioxide   


 


BUN   


 


Creatinine   


 


Glucose   


 


POC Glucose   


 


Calcium   


 


Phosphorus   


 


Total Bilirubin   


 


AST   


 


ALT   


 


Troponin T  0.126 H*  


 


NT-Pro-B Natriuret Pep   


 


Total Protein   


 


Albumin   


 


LDL Cholesterol Direct   


 


Urine Creatinine   


 


Urine Total Protein   














  05/18/20 05/18/20 05/18/20





  04:45 16:23 16:23


 


WBC   


 


Hct   


 


MCV   


 


MCH   


 


RDW   


 


Plt Count   


 


Seg Neutrophils %   


 


Seg Neuts % (Manual)   


 


Lymphocytes % (Manual)   


 


Nucleated RBC %   


 


Seg Neutrophils # Man   


 


Lymphocytes # (Manual)   


 


PT   


 


INR   


 


Heparin Anti-Xa Level   0.27 L 


 


ABG pH   


 


ABG pO2   


 


ABG HCO3   


 


ABG O2 Saturation   


 


Oxyhemoglobin   


 


Sodium  127 L   123 L


 


Potassium  5.1 H D  


 


Chloride  83.9 L  


 


Carbon Dioxide  32 H  


 


BUN   


 


Creatinine  1.3 H D  


 


Glucose  116 H  


 


POC Glucose   


 


Calcium  8.3 L  


 


Phosphorus   


 


Total Bilirubin   


 


AST   


 


ALT   


 


Troponin T   


 


NT-Pro-B Natriuret Pep   


 


Total Protein   


 


Albumin   


 


LDL Cholesterol Direct   


 


Urine Creatinine   


 


Urine Total Protein   














  05/18/20 05/19/20 05/19/20





  18:42 09:32 09:32


 


WBC   11.4 H 


 


Hct   


 


MCV   106 H 


 


MCH   33 H 


 


RDW   19.5 H 


 


Plt Count   131 L 


 


Seg Neutrophils %   


 


Seg Neuts % (Manual)   


 


Lymphocytes % (Manual)   


 


Nucleated RBC %   


 


Seg Neutrophils # Man   


 


Lymphocytes # (Manual)   


 


PT   


 


INR   


 


Heparin Anti-Xa Level   


 


ABG pH   


 


ABG pO2   


 


ABG HCO3   


 


ABG O2 Saturation   


 


Oxyhemoglobin   


 


Sodium    128 L


 


Potassium   


 


Chloride    82.4 L


 


Carbon Dioxide    31 H


 


BUN    25 H


 


Creatinine    1.9 H


 


Glucose    61 L


 


POC Glucose  135 H  


 


Calcium    8.2 L


 


Phosphorus   


 


Total Bilirubin   


 


AST   


 


ALT   


 


Troponin T   


 


NT-Pro-B Natriuret Pep   


 


Total Protein   


 


Albumin   


 


LDL Cholesterol Direct   


 


Urine Creatinine   


 


Urine Total Protein   














  05/19/20 05/19/20 05/19/20





  13:37 19:24 19:33


 


WBC   


 


Hct   


 


MCV   


 


MCH   


 


RDW   


 


Plt Count   


 


Seg Neutrophils %   


 


Seg Neuts % (Manual)   


 


Lymphocytes % (Manual)   


 


Nucleated RBC %   


 


Seg Neutrophils # Man   


 


Lymphocytes # (Manual)   


 


PT   


 


INR   


 


Heparin Anti-Xa Level  < 0.10 L  


 


ABG pH   


 


ABG pO2   


 


ABG HCO3   


 


ABG O2 Saturation   


 


Oxyhemoglobin   


 


Sodium   


 


Potassium   


 


Chloride   


 


Carbon Dioxide   


 


BUN   


 


Creatinine   


 


Glucose   


 


POC Glucose   < 40 L  > 500 H


 


Calcium   


 


Phosphorus   


 


Total Bilirubin   


 


AST   


 


ALT   


 


Troponin T   


 


NT-Pro-B Natriuret Pep   


 


Total Protein   


 


Albumin   


 


LDL Cholesterol Direct   


 


Urine Creatinine   


 


Urine Total Protein   














  05/19/20 05/19/20 05/19/20





  20:01 20:10 21:03


 


WBC   


 


Hct   


 


MCV   


 


MCH   


 


RDW   


 


Plt Count   


 


Seg Neutrophils %   


 


Seg Neuts % (Manual)   


 


Lymphocytes % (Manual)   


 


Nucleated RBC %   


 


Seg Neutrophils # Man   


 


Lymphocytes # (Manual)   


 


PT   


 


INR   


 


Heparin Anti-Xa Level  < 0.10 L  


 


ABG pH   


 


ABG pO2   


 


ABG HCO3   


 


ABG O2 Saturation   


 


Oxyhemoglobin   


 


Sodium   124 L 


 


Potassium   5.5 H D 


 


Chloride   82.4 L 


 


Carbon Dioxide   


 


BUN   31 H 


 


Creatinine   2.1 H 


 


Glucose   212 H 


 


POC Glucose    202 H


 


Calcium   8.0 L 


 


Phosphorus   


 


Total Bilirubin   


 


AST   


 


ALT   


 


Troponin T   


 


NT-Pro-B Natriuret Pep   


 


Total Protein   


 


Albumin   


 


LDL Cholesterol Direct   


 


Urine Creatinine   


 


Urine Total Protein   














  05/19/20 05/19/20 05/19/20





  21:53 23:18 Unknown


 


WBC   


 


Hct   


 


MCV   


 


MCH   


 


RDW   


 


Plt Count   


 


Seg Neutrophils %   


 


Seg Neuts % (Manual)   


 


Lymphocytes % (Manual)   


 


Nucleated RBC %   


 


Seg Neutrophils # Man   


 


Lymphocytes # (Manual)   


 


PT   


 


INR   


 


Heparin Anti-Xa Level   


 


ABG pH   


 


ABG pO2   


 


ABG HCO3   


 


ABG O2 Saturation   


 


Oxyhemoglobin   


 


Sodium   


 


Potassium   


 


Chloride   


 


Carbon Dioxide   


 


BUN   


 


Creatinine   


 


Glucose   


 


POC Glucose  177 H  203 H 


 


Calcium   


 


Phosphorus   


 


Total Bilirubin   


 


AST   


 


ALT   


 


Troponin T   


 


NT-Pro-B Natriuret Pep   


 


Total Protein   


 


Albumin   


 


LDL Cholesterol Direct   


 


Urine Creatinine    216.1 H


 


Urine Total Protein    62 H














  05/20/20 05/20/20 05/20/20





  00:15 02:20 04:34


 


WBC   


 


Hct   


 


MCV   


 


MCH   


 


RDW   


 


Plt Count   


 


Seg Neutrophils %   


 


Seg Neuts % (Manual)   


 


Lymphocytes % (Manual)   


 


Nucleated RBC %   


 


Seg Neutrophils # Man   


 


Lymphocytes # (Manual)   


 


PT   


 


INR   


 


Heparin Anti-Xa Level   


 


ABG pH   


 


ABG pO2   


 


ABG HCO3   


 


ABG O2 Saturation   


 


Oxyhemoglobin   


 


Sodium    129 L


 


Potassium   


 


Chloride    83.7 L


 


Carbon Dioxide   


 


BUN    36 H


 


Creatinine    2.8 H


 


Glucose    129 H


 


POC Glucose  156 H  149 H 


 


Calcium    7.5 L


 


Phosphorus   


 


Total Bilirubin    2.30 H


 


AST    3636 H


 


ALT    1241 H


 


Troponin T   


 


NT-Pro-B Natriuret Pep   


 


Total Protein   


 


Albumin    3.2 L


 


LDL Cholesterol Direct   


 


Urine Creatinine   


 


Urine Total Protein   














  05/20/20 05/20/20 05/20/20





  04:34 05:19 12:13


 


WBC   


 


Hct   


 


MCV   


 


MCH   


 


RDW   


 


Plt Count   


 


Seg Neutrophils %   


 


Seg Neuts % (Manual)   


 


Lymphocytes % (Manual)   


 


Nucleated RBC %   


 


Seg Neutrophils # Man   


 


Lymphocytes # (Manual)   


 


PT   


 


INR   


 


Heparin Anti-Xa Level  0.10 L  


 


ABG pH   


 


ABG pO2   


 


ABG HCO3   


 


ABG O2 Saturation   


 


Oxyhemoglobin   


 


Sodium   


 


Potassium   


 


Chloride   


 


Carbon Dioxide   


 


BUN   


 


Creatinine   


 


Glucose   


 


POC Glucose   115 H  69 L


 


Calcium   


 


Phosphorus   


 


Total Bilirubin   


 


AST   


 


ALT   


 


Troponin T   


 


NT-Pro-B Natriuret Pep   


 


Total Protein   


 


Albumin   


 


LDL Cholesterol Direct   


 


Urine Creatinine   


 


Urine Total Protein   














  05/20/20 05/20/20 05/20/20





  15:15 17:37 18:44


 


WBC   


 


Hct   


 


MCV   


 


MCH   


 


RDW   


 


Plt Count   


 


Seg Neutrophils %   


 


Seg Neuts % (Manual)   


 


Lymphocytes % (Manual)   


 


Nucleated RBC %   


 


Seg Neutrophils # Man   


 


Lymphocytes # (Manual)   


 


PT   


 


INR   


 


Heparin Anti-Xa Level   


 


ABG pH  7.180 L*  


 


ABG pO2  70.1 L  


 


ABG HCO3  31.5 H  


 


ABG O2 Saturation  89.6 L  


 


Oxyhemoglobin  87.7 L  


 


Sodium   


 


Potassium   


 


Chloride   


 


Carbon Dioxide   


 


BUN   


 


Creatinine   


 


Glucose   


 


POC Glucose   68 L  160 H


 


Calcium   


 


Phosphorus   


 


Total Bilirubin   


 


AST   


 


ALT   


 


Troponin T   


 


NT-Pro-B Natriuret Pep   


 


Total Protein   


 


Albumin   


 


LDL Cholesterol Direct   


 


Urine Creatinine   


 


Urine Total Protein   














  05/20/20 05/20/20 05/20/20





  20:16 21:00 Unknown


 


WBC   


 


Hct   


 


MCV   


 


MCH   


 


RDW   


 


Plt Count   


 


Seg Neutrophils %   


 


Seg Neuts % (Manual)   


 


Lymphocytes % (Manual)   


 


Nucleated RBC %   


 


Seg Neutrophils # Man   


 


Lymphocytes # (Manual)   


 


PT   


 


INR   


 


Heparin Anti-Xa Level   0.20 L  0.12 L


 


ABG pH  7.184 L*  


 


ABG pO2  77.9 L  


 


ABG HCO3  30.5 H  


 


ABG O2 Saturation  92.8 L  


 


Oxyhemoglobin  90.7 L  


 


Sodium   


 


Potassium   


 


Chloride   


 


Carbon Dioxide   


 


BUN   


 


Creatinine   


 


Glucose   


 


POC Glucose   


 


Calcium   


 


Phosphorus   


 


Total Bilirubin   


 


AST   


 


ALT   


 


Troponin T   


 


NT-Pro-B Natriuret Pep   


 


Total Protein   


 


Albumin   


 


LDL Cholesterol Direct   


 


Urine Creatinine   


 


Urine Total Protein   














  05/21/20 05/21/20 05/21/20





  00:06 00:08 05:35


 


WBC    15.0 H


 


Hct   


 


MCV    107 H


 


MCH    34 H


 


RDW    19.5 H


 


Plt Count    56 L


 


Seg Neutrophils %   


 


Seg Neuts % (Manual)    92.0 H


 


Lymphocytes % (Manual)    5.0 L


 


Nucleated RBC %    4.0 H


 


Seg Neutrophils # Man    0.0 L


 


Lymphocytes # (Manual)    0.0 L


 


PT   


 


INR   


 


Heparin Anti-Xa Level   


 


ABG pH   7.230 L 


 


ABG pO2   79.8 L 


 


ABG HCO3   29.2 H 


 


ABG O2 Saturation   94.3 L 


 


Oxyhemoglobin   92.3 L 


 


Sodium   


 


Potassium   


 


Chloride   


 


Carbon Dioxide   


 


BUN   


 


Creatinine   


 


Glucose   


 


POC Glucose  185 H  


 


Calcium   


 


Phosphorus   


 


Total Bilirubin   


 


AST   


 


ALT   


 


Troponin T   


 


NT-Pro-B Natriuret Pep   


 


Total Protein   


 


Albumin   


 


LDL Cholesterol Direct   


 


Urine Creatinine   


 


Urine Total Protein   














  05/21/20 05/21/20 05/21/20





  05:35 06:07 12:33


 


WBC   


 


Hct   


 


MCV   


 


MCH   


 


RDW   


 


Plt Count   


 


Seg Neutrophils %   


 


Seg Neuts % (Manual)   


 


Lymphocytes % (Manual)   


 


Nucleated RBC %   


 


Seg Neutrophils # Man   


 


Lymphocytes # (Manual)   


 


PT   


 


INR   


 


Heparin Anti-Xa Level   


 


ABG pH   


 


ABG pO2   


 


ABG HCO3   


 


ABG O2 Saturation   


 


Oxyhemoglobin   


 


Sodium  130 L  


 


Potassium   


 


Chloride  85.2 L  


 


Carbon Dioxide   


 


BUN  49 H  


 


Creatinine  3.4 H  


 


Glucose  157 H  


 


POC Glucose   158 H  170 H


 


Calcium  6.7 L  


 


Phosphorus  7.00 H  


 


Total Bilirubin  2.10 H  


 


AST  2625 H  


 


ALT  1547 H  


 


Troponin T   


 


NT-Pro-B Natriuret Pep   


 


Total Protein  6.2 L  


 


Albumin  3.3 L  


 


LDL Cholesterol Direct   


 


Urine Creatinine   


 


Urine Total Protein   











Chest x-ray: image reviewed

## 2020-05-21 NOTE — XRAY REPORT
ABDOMEN 1 VIEW(S)



INDICATION / CLINICAL INFORMATION:  NGT placement confirmation.



COMPARISON:  None available.



FINDINGS:



TUBES / LINES: The nasogastric tube is coiled in the distal stomach.

BOWEL GAS PATTERN: No significant abnormality. 

FREE AIR / EXTRALUMINAL GAS: None seen.



ADDITIONAL FINDINGS: No significant additional findings.



IMPRESSION:

No significant abnormality. Nasogastric tube as described. 



Signer Name: Serjio Abraham Jr, MD 

Signed: 5/21/2020 2:59 PM

Workstation Name: [id number]

## 2020-05-21 NOTE — PROGRESS NOTE
Assessment and Plan


Assessment and plan: 


--Acute hypoxic respiratory failure; on BiPAP


Titrate O2 sats to more than 90%, nebulizers


IV steroids, pulmonary following,ABG


Intubate as needed





--Hypotension shock/on vasopressors


Titrate systolic blood pressure to more than 110/map more than 65





--Severe pulmonary hypertension; on echo


Continue current management, pulmonary following





--Cor pulmonale/severe pulmonary hypertension;


Management per pulmonary





--Acute exacerbation of COPD; on home oxygen


Oxygen, nebulizers, IV steroids, pulmonary following





--Acute on chronic systolic congestive heart failure; EF 20 to 25%


Continue heart failure medications, input output monitoring


Low-sodium diet, fluid restriction, cardiology following





--Atrial flutter with variable conduction;


On Cardizem and sotalol and heparin drip


Sotalol discontinued due to bradycardia by cardiology





--Acute transaminitis; shock liver /to liver congestion


Due to cardiomyopathy.  And hypotension 


Transaminases trending down





--Acute kidney injury; vasomotor nephropathy


Management per nephrology, avoid nephrotoxins





--Hyponatremia; mild improvement


Secondary to fluid overload, current management


Diuretic therapy, follow electrolytes, nephrology following





--Symptomatic hypoglycemia; [5/19/2020]


Received D50, D10 IV fluids, significantly improved


Closely monitor blood sugars adjust as needed





--Metabolic encephalopathy;lethergy


Due to underlying disease process


Treat the underlying cause, supportive care





--Generalized anasarca/edema


Due to cardiomyopathy, severe pulmonary hypertension


renal failure.  Treat the underlying cause





--Morbid obesity; BMI 38.6


Partly due to fluid overload


Continue supportive care





--DVT prophylaxis;


Patient is on heparin drip





--Full CODE STATUS.





Patient has multiple medical problems


Critically ill, poor prognosis





Plan of care reviewed with the patient'S nurse


And intensivist


Tube feeding per protocol





Critical care time 35 minutes














History


Interval history: 


Patient seen and examined at the bedside in ICU this morning


Patient's chart and medications reviewed


Overnight events, consultants recommendations noted


Patient remains hypotensive on vasopressors on BiPAP


Lethargic , noncommunicative


Vital signs noted











Hospitalist Physical





- Constitutional


Vitals: 


                                        











Temp Pulse Resp BP Pulse Ox


 


 97.9 F   95 H  28 H  95/66   95 


 


 05/21/20 12:00  05/21/20 16:00  05/21/20 16:00  05/21/20 16:00  05/21/20 16:00











General appearance: Present: mild distress, well-nourished, obese (Morbidly 

obese), other (Lethargic, noncommunicative, on BiPAP)





- EENT


Eyes: Present: PERRL, EOM intact





- Neck


Neck: Present: supple





- Respiratory


Respiratory effort: labored


Respiratory: bilateral: diminished, rhonchi, negative: rales, wheezing





- Cardiovascular


Rhythm: regular


Heart Sounds: Present: S1 & S2





- Extremities


Extremities: no ischemia


Extremity abnormal: edema





- Abdominal


General gastrointestinal: soft, non-tender, non-distended, normal bowel sounds





- Integumentary


Integumentary: Present: clear, warm





- Psychiatric


Psychiatric: other (Noncommunicative lethargic)





- Neurologic


Neurologic: moves all extremities





HEART Score





- HEART Score


EKG: Non-specific


Age: > 65


Risk factors: > 3 risk factors or hx of atherosclerotic disease


Troponin: 


                                        











Troponin T  0.126 ng/mL (0.00-0.029)  H*  05/17/20  05:29    











Troponin: < normal limit





- Critical Actions


Critical Actions: 4-6 pts:12-16.6% risk of adverse cardiac event. Should be 

admitted





Results





- Labs


CBC & Chem 7: 


                                 05/21/20 05:35





                                 05/21/20 05:35


Labs: 


                             Laboratory Last Values











WBC  15.0 K/mm3 (4.5-11.0)  H  05/21/20  05:35    


 


RBC  4.00 M/mm3 (3.65-5.03)   05/21/20  05:35    


 


Hgb  13.5 gm/dl (10.1-14.3)   05/21/20  05:35    


 


Hct  42.6 % (30.3-42.9)   05/21/20  05:35    


 


MCV  107 fl (79-97)  H  05/21/20  05:35    


 


MCH  34 pg (28-32)  H  05/21/20  05:35    


 


MCHC  32 % (30-34)   05/21/20  05:35    


 


RDW  19.5 % (13.2-15.2)  H  05/21/20  05:35    


 


Plt Count  56 K/mm3 (140-440)  L  05/21/20  05:35    


 


Lymph % (Auto)  16.4 % (13.4-35.0)   05/17/20  03:25    


 


Mono % (Auto)  5.8 % (0.0-7.3)   05/17/20  03:25    


 


Eos % (Auto)  0.3 % (0.0-4.3)   05/17/20  03:25    


 


Baso % (Auto)  0.5 % (0.0-1.8)   05/17/20  03:25    


 


Lymph #  1.4 K/mm3 (1.2-5.4)   05/17/20  03:25    


 


Mono #  0.5 K/mm3 (0.0-0.8)   05/17/20  03:25    


 


Eos #  0.0 K/mm3 (0.0-0.4)   05/17/20  03:25    


 


Baso #  0.0 K/mm3 (0.0-0.1)   05/17/20  03:25    


 


Add Manual Diff  Complete   05/21/20  05:35    


 


Total Counted  100   05/21/20  05:35    


 


Seg Neutrophils %  Np   05/21/20  05:35    


 


Seg Neuts % (Manual)  92.0 % (40.0-70.0)  H  05/21/20  05:35    


 


Band Neutrophils %  0 %  05/21/20  05:35    


 


Lymphocytes % (Manual)  5.0 % (13.4-35.0)  L  05/21/20  05:35    


 


Reactive Lymphs % (Man)  0 %  05/21/20  05:35    


 


Monocytes % (Manual)  3.0 % (0.0-7.3)   05/21/20  05:35    


 


Eosinophils % (Manual)  0 % (0.0-4.3)   05/21/20  05:35    


 


Basophils % (Manual)  0 % (0.0-1.8)   05/21/20  05:35    


 


Metamyelocytes %  0 %  05/21/20  05:35    


 


Myelocytes %  0 %  05/21/20  05:35    


 


Promyelocytes %  0 %  05/21/20  05:35    


 


Blast Cells %  0 %  05/21/20  05:35    


 


Nucleated RBC %  4.0 % (0.0-0.9)  H  05/21/20  05:35    


 


Seg Neutrophils #  6.7 K/mm3 (1.8-7.7)   05/17/20  03:25    


 


Seg Neutrophils # Man  0.0 K/mm3 (1.8-7.7)  L  05/21/20  05:35    


 


Band Neutrophils #  0.0 K/mm3  05/21/20  05:35    


 


Lymphocytes # (Manual)  0.0 K/mm3 (1.2-5.4)  L  05/21/20  05:35    


 


Abs React Lymphs (Man)  0.0 K/mm3  05/21/20  05:35    


 


Monocytes # (Manual)  0.0 K/mm3 (0.0-0.8)   05/21/20  05:35    


 


Eosinophils # (Manual)  0.0 K/mm3 (0.0-0.4)   05/21/20  05:35    


 


Basophils # (Manual)  0.0 K/mm3 (0.0-0.1)   05/21/20  05:35    


 


Metamyelocytes #  0.0 K/mm3  05/21/20  05:35    


 


Myelocytes #  0.0 K/mm3  05/21/20  05:35    


 


Promyelocytes #  0.0 K/mm3  05/21/20  05:35    


 


Blast Cells #  0.0 K/mm3  05/21/20  05:35    


 


WBC Morphology  Not Reportable   05/21/20  05:35    


 


Hypersegmented Neuts  Not Reportable   05/21/20  05:35    


 


Hyposegmented Neuts  Not Reportable   05/21/20  05:35    


 


Hypogranular Neuts  Not Reportable   05/21/20  05:35    


 


Smudge Cells  Not Reportable   05/21/20  05:35    


 


Toxic Granulation  Not Reportable   05/21/20  05:35    


 


Toxic Vacuolation  Not Reportable   05/21/20  05:35    


 


Dohle Bodies  Not Reportable   05/21/20  05:35    


 


Pelger-Huet Anomaly  Not Reportable   05/21/20  05:35    


 


Chitra Rods  Not Reportable   05/21/20  05:35    


 


Platelet Estimate  Consistent w auto   05/21/20  05:35    


 


Clumped Platelets  Not Reportable   05/21/20  05:35    


 


Plt Clumps, EDTA  Not Reportable   05/21/20  05:35    


 


Large Platelets  Not Reportable   05/21/20  05:35    


 


Giant Platelets  Not Reportable   05/21/20  05:35    


 


Platelet Satelliting  Not Reportable   05/21/20  05:35    


 


Plt Morphology Comment  Not Reportable   05/21/20  05:35    


 


RBC Morphology  Not Reportable   05/21/20  05:35    


 


Dimorphic RBCs  Not Reportable   05/21/20  05:35    


 


Polychromasia  Not Reportable   05/21/20  05:35    


 


Hypochromasia  Not Reportable   05/21/20  05:35    


 


Poikilocytosis  Not Reportable   05/21/20  05:35    


 


Anisocytosis  Not Reportable   05/21/20  05:35    


 


Microcytosis  Not Reportable   05/21/20  05:35    


 


Macrocytosis  Not Reportable   05/21/20  05:35    


 


Spherocytes  Not Reportable   05/21/20  05:35    


 


Pappenheimer Bodies  Not Reportable   05/21/20  05:35    


 


Sickle Cells  Not Reportable   05/21/20  05:35    


 


Target Cells  Not Reportable   05/21/20  05:35    


 


Tear Drop Cells  Rare   05/21/20  05:35    


 


Ovalocytes  Not Reportable   05/21/20  05:35    


 


Helmet Cells  Not Reportable   05/21/20  05:35    


 


Yeager-Currie Bodies  Not Reportable   05/21/20  05:35    


 


Cabot Rings  Not Reportable   05/21/20  05:35    


 


Riley Cells  Rare   05/21/20  05:35    


 


Bite Cells  Not Reportable   05/21/20  05:35    


 


Crenated Cell  Not Reportable   05/21/20  05:35    


 


Elliptocytes  1+   05/21/20  05:35    


 


Acanthocytes (Spur)  Not Reportable   05/21/20  05:35    


 


Rouleaux  Not Reportable   05/21/20  05:35    


 


Hemoglobin C Crystals  Not Reportable   05/21/20  05:35    


 


Schistocytes  Not Reportable   05/21/20  05:35    


 


Malaria parasites  Not Reportable   05/21/20  05:35    


 


Wes Bodies  Not Reportable   05/21/20  05:35    


 


Hem Pathologist Commnt  No   05/21/20  05:35    


 


PT  15.3 Sec. (12.2-14.9)  H  05/17/20  03:25    


 


INR  1.20  (0.87-1.13)  H  05/17/20  03:25    


 


APTT  29.0 Sec. (24.2-36.6)   05/17/20  03:25    


 


Heparin Anti-Xa Level  0.12 U.I./ml (0.3-0.7)  L  05/20/20  Unknown


 


ABG pH  7.230 pH Units (7.350-7.450)  L  05/21/20  00:08    


 


ABG pCO2  71.5 mm Hg  05/21/20  00:08    


 


ABG pO2  79.8 mm Hg (80.0-90.0)  L  05/21/20  00:08    


 


ABG HCO3  29.2 mmol/L (20.0-26.0)  H  05/21/20  00:08    


 


ABG O2 Saturation  94.3 % (95.0-99.0)  L  05/21/20  00:08    


 


ABG O2 Content  18.0  (0.0-44)   05/21/20  00:08    


 


ABG Base Excess  -0.2 mmol/L (-2.0-3.0)   05/21/20  00:08    


 


ABG Hemoglobin  13.9 gm/dl (12.0-16.0)   05/21/20  00:08    


 


ABG Carboxyhemoglobin  1.7 % (0.0-5.0)   05/21/20  00:08    


 


ABG Methemoglobin  0.5 % (0.0-1.5)   05/21/20  00:08    


 


Oxyhemoglobin  92.3 % (95.0-99.0)  L  05/21/20  00:08    


 


FiO2  75 %  05/21/20  00:08    


 


Sodium  130 mmol/L (137-145)  L  05/21/20  05:35    


 


Potassium  4.7 mmol/L (3.6-5.0)   05/21/20  05:35    


 


Chloride  85.2 mmol/L ()  L  05/21/20  05:35    


 


Carbon Dioxide  30 mmol/L (22-30)   05/21/20  05:35    


 


Anion Gap  20 mmol/L  05/21/20  05:35    


 


BUN  49 mg/dL (7-17)  H  05/21/20  05:35    


 


Creatinine  3.4 mg/dL (0.7-1.2)  H  05/21/20  05:35    


 


Estimated GFR  16 ml/min  05/21/20  05:35    


 


BUN/Creatinine Ratio  14 %  05/21/20  05:35    


 


Glucose  157 mg/dL ()  H  05/21/20  05:35    


 


POC Glucose  170  ()  H  05/21/20  12:33    


 


Calcium  6.7 mg/dL (8.4-10.2)  L  05/21/20  05:35    


 


Phosphorus  7.00 mg/dL (2.5-4.5)  H  05/21/20  05:35    


 


Magnesium  1.90 mg/dL (1.7-2.3)   05/21/20  05:35    


 


Total Bilirubin  2.10 mg/dL (0.1-1.2)  H  05/21/20  05:35    


 


AST  2625 units/L (5-40)  H  05/21/20  05:35    


 


ALT  1547 units/L (7-56)  H  05/21/20  05:35    


 


Alkaline Phosphatase  103 units/L ()   05/21/20  05:35    


 


Total Creatine Kinase  112 units/L ()   05/18/20  16:23    


 


CK-MB (CK-2)  3.5 ng/mL (0.0-4.0)   05/18/20  16:23    


 


Troponin T  0.126 ng/mL (0.00-0.029)  H*  05/17/20  05:29    


 


NT-Pro-B Natriuret Pep  3719 pg/mL (0-900)  H  05/16/20  22:13    


 


Total Protein  6.2 g/dL (6.3-8.2)  L  05/21/20  05:35    


 


Albumin  3.3 g/dL (3.9-5)  L  05/21/20  05:35    


 


Albumin/Globulin Ratio  1.1 %  05/21/20  05:35    


 


Triglycerides  68 mg/dL (2-149)   05/17/20  00:12    


 


Cholesterol  83 mg/dL ()   05/17/20  00:12    


 


LDL Cholesterol Direct  33 mg/dL ()  L  05/17/20  00:12    


 


HDL Cholesterol  49 mg/dL (40-59)   05/17/20  00:12    


 


Cholesterol/HDL Ratio  1.69 %  05/17/20  00:12    


 


Urine Color  Mandi  (Yellow)   05/19/20  Unknown


 


Urine Turbidity  Slightly-cloudy  (Clear)   05/19/20  Unknown


 


Urine pH  5.0  (5.0-7.0)   05/19/20  Unknown


 


Ur Specific Gravity  1.014  (1.003-1.030)   05/19/20  Unknown


 


Urine Protein  30 mg/dl mg/dL (Negative)   05/19/20  Unknown


 


Urine Glucose (UA)  Neg mg/dL (Negative)   05/19/20  Unknown


 


Urine Ketones  Neg mg/dL (Negative)   05/19/20  Unknown


 


Urine Blood  Neg  (Negative)   05/19/20  Unknown


 


Urine Nitrite  Neg  (Negative)   05/19/20  Unknown


 


Urine Bilirubin  Neg  (Negative)   05/19/20  Unknown


 


Urine Urobilinogen  2.0 mg/dL (<2.0)   05/19/20  Unknown


 


Ur Leukocyte Esterase  Sm  (Negative)   05/19/20  Unknown


 


Urine WBC (Auto)  2.0 /HPF (0.0-6.0)   05/19/20  Unknown


 


Urine RBC (Auto)  2.0 /HPF (0.0-6.0)   05/19/20  Unknown


 


U Epithel Cells (Auto)  2.0 /HPF (0-13.0)   05/19/20  Unknown


 


Urine Bacteria (Auto)  1+ /HPF (Negative)   05/19/20  Unknown


 


Urine Osmolality  319 Mosm/kg  05/19/20  Unknown


 


Urine Creatinine  216.1 mg/dL (0.1-20.0)  H  05/19/20  Unknown


 


Protein/Creatinin Ratio  0.29   05/19/20  Unknown


 


Urine Sodium  10 mmol/L  05/19/20  Unknown


 


Urine Total Protein  62 mg/dL (5-11.8)  H  05/19/20  Unknown














- Diagnostic Impressions


Diagnostic Impressions: 








Echocardiogram  05/16/20 23:50


Transthoracic Echocardiogram


 


Indication:


CHF


BP:           84/59


HR:           117


 


Conclusions


 *4-chamber dilated cardiomyopathy.


 *The right heart chambers are both severely dilated. There is


moderately severe tricuspid regurgitation, and severe pulmonary HTN,


with PASP of 82mmHg.


 *Left heart chambers are moderately dilated.


 *Global left ventricular systolic function is severely decreased.


 *The estimated ejection fraction is 20-25%. 


 *There is mild mitral regurgitation. 


 


Findings     


Left Ventricle:


The left ventricular chamber size is moderately dilated. There is no


left ventricular hypertrophy. Severe global hypokinesis of the left


ventricle is observed. Global left ventricular systolic function is


severely decreased. The estimated ejection fraction is 20-25%.  Abnormal


left ventricular diastolic function is observed. 


 


Left Atrium:


The left atrium is moderate to severely dilated.  


 


Right Ventricle:


The right ventricle is severely dilated.  The right ventricular global


systolic function is severely reduced. 


 


Right Atrium:


The right atrial cavity size is severely dilated. 


 


Aortic Valve:


The aortic valve leaflets are moderately thickened. There is trace of


aortic regurgitation. There is no evidence of aortic stenosis. 


 


Mitral Valve:


The mitral valve leaflets are moderately thickened. There is mild mitral


regurgitation.  There is no evidence of mitral stenosis. 


 


Tricuspid Valve:


The tricuspid valve leaflets are normal.  There is moderate to severe


tricuspid regurgitation. The right ventricular systolic pressure is


calculated at 82 mmHg.  There is evidence of severe pulmonary


hypertension. There is no tricuspid stenosis. 


 


Pulmonic Valve:


The pulmonic valve appears normal. There is mild pulmonic regurgitation.


 


 


Pericardium:


A trivial pericardial effusion is visualized. 


 


Aorta:


There is no dilatation of the ascending aorta. There is no dilatation of


the aortic root. 


 


Venous:


The inferior vena cava is dilated.  There is less than 50% respiratory


change in the inferior vena cava dimension. 


 


Measurements     


Chambers 2D


Name                    Value         Normal Range            


IVSd (2D)               1.56 cm       (0.6 - 1.1)             


LVPWd (2D)              1.56 cm       (0.6 - 1.1)             


LVIDd (2D)              5.05 cm       (3.7 - 5.6)             


LVIDs (2D)              4.47 cm       (2 - 3.8)               


LV FS (2D)              11.64 %       -                        


EF Teichholz (2D)       25.1 %        -                        


Ao root diameter (2D)   3.06 cm       (2 - 3.7)               


 


Volumes/Mass


Name                    Value         Normal Range            


LA ESV SP 4CH (A/L)     76.11 ml      -                        


LA ESV SP 2CH (A/L)     48.94 ml      -                        


LA ESV BP (A/L)         62.38 ml      -                        


LA ESV SP 4CH (MOD)     70.31 ml      -                        


LA ESV SP 2CH (MOD)     46.04 ml      -                        


LA ESV BP (MOD)         58.03 ml      -                        


LA ESV BP (MOD) index   28.87 ml/m2   -                        


LV EDV SP 4CH (MOD)     81.51 ml      -                        


LV ESV SP 4CH (MOD)     46.31 ml      -                        


EF SP 4CH (MOD)         43.18 %       -                        


LV EDV SP 2CH (MOD)     84.04 ml      -                        


LV ESV SP 2CH (MOD)     60.72 ml      -                        


EF SP 2CH (MOD)         27.76 %       -                        


LV EDV BP               84.52 ml      -                        


LV ESV BP               53.64 ml      -                        


BP EF (MOD)             36.54 %       -                        


 


Aortic Valve


Name                    Value         Normal Range            


LVOT diameter           2.01 cm       -                        


LVOT Vmax               0.64 m/sec    -                        


LVOT VTI                8.01 cm       -                        


LVOT peak gradient      1.62 mmHg     -                        


LVOT mean gradient      0.79 mmHg     -                        


SV LVOT                 25.42 ml      -                        


Ascending Ao            2.81 cm       -                        


 


Tricuspid Valve


Name                    Value         Normal Range            


TR Vmax                 4.09 m/sec    -                        


TR peak gradient        66.78 mmHg    -                        


RAP                     15 mmHg       -                        


RVSP                    82 mmHg       -                        


 


Pulmonic Valve/Qp:Qs


Name                    Value         Normal Range            


PV Vmax                 0.87 m/sec    -                        


PV peak gradient        3.06 mmHg     -                        


MS end-diastolic Vmax   2.66 m/sec    -                        


RVOT Vmax               0.7 m/sec     -                        


RVOT peak gradient      1.96 mmHg     -                        


PV acceleration time    53.28 msec    -                        


 


Electronically signed by: Shade Vela MD on 05/17/2020 15:34:04











Min/IV: 


                                        





Voiding Method                   External Female Catheter


IV Catheter Type [Right          Triple Lumen Cath


Femoral]                         


IV Catheter Type [Right Foot]    INT / Saline Lock


IV Catheter Type [Right          INT / Saline Lock


Forearm]                         


IV Catheter Type [Left Wrist]    INT / Saline Lock


IV Catheter Type [Left Forearm   INT / Saline Lock


]                                











Active Medications





- Current Medications


Current Medications: 














Generic Name Dose Route Start Last Admin





  Trade Name Freq  PRN Reason Stop Dose Admin


 


Acetaminophen  650 mg  05/16/20 23:45 





  Tylenol  PO  





  Q4H PRN  





  Pain MILD(1-3)/Fever >100.5/HA  


 


Albuterol  2.5 mg  05/17/20 14:57 





  Proventil  IH  





  Q4HRT PRN  





  Shortness Of Breath  


 


Albuterol/Ipratropium  1 ampul  05/17/20 20:00  05/21/20 13:28





  Duoneb *Not For Prn Use*  IH   1 ampul





  TIDRT AMANDA   Administration


 


Lipase/Protease/Amylase  1 each  05/20/20 15:33 





  Pancreaze Dr 10,500 Unit  FEEDTUBE  





  PRN PRN  





  For Clogged Feeding Tube  


 


Arformoterol Tartrate  15 mcg  05/17/20 20:00  05/21/20 08:09





  Brovana Nebu  IH   15 mcg





  Q12HRT AMANDA   Administration


 


Aspirin  325 mg  05/17/20 10:00  05/21/20 14:11





  Ecotrin  PO   Not Given





  QDAY AMANDA  


 


Atorvastatin Calcium  40 mg  05/17/20 22:00  05/20/20 21:11





  Lipitor  PO   Not Given





  QHS AMANDA  


 


Budesonide  0.5 mg  05/17/20 20:00  05/21/20 08:09





  Pulmicort  IH   0.5 mg





  Q12HRT AMANDA   Administration


 


Dextrose  50 ml  05/20/20 17:44 





  D50w (25gm) Syringe  IV  





  Q30MIN PRN  





  Hypoglycemia  





  Protocol  


 


Diltiazem HCl  30 mg  05/17/20 18:00  05/21/20 13:16





  Cardizem  PO   Not Given





  Q6HR AMANDA  


 


Famotidine  20 mg  05/21/20 10:00  05/21/20 10:48





  Pepcid  IV   20 mg





  QDAY AMANDA   Administration


 


Dextrose/Sodium Chloride  1,000 mls @ 75 mls/hr  05/20/20 18:00  05/21/20 10:50





  D5ns  IV   75 mls/hr





  AS DIRECT AMANDA   Administration


 


Norepinephrine  4 mg in 250 mls @ 7.5 mls/hr  05/20/20 18:00  05/21/20 16:00





  Levophed Drip 4 Mg/Ns 250 Ml  IV   0 mcg/min





  TITR AMANDA   0 mls/hr





    Titration





  Protocol  





  2 MCG/MIN  


 


Vasopressin 20 unit/ Sodium  101 mls @ 9.09 mls/hr  05/20/20 18:00  05/20/20 

18:30





  Chloride  IV   0 units/min





  TITR AMANDA   0 mls/hr





    Titration





  Protocol  





  0.03 UNITS/MIN  


 


Magnesium Hydroxide  30 ml  05/16/20 23:45 





  Milk Of Magnesia  PO  





  Q4H PRN  





  Constipation  


 


Methylprednisolone Sodium Succinate  40 mg  05/21/20 10:00  05/21/20 10:48





  Solu-Medrol  IV   40 mg





  Q12HR AMANDA   Administration


 


Morphine Sulfate  2 mg  05/16/20 23:45 





  Morphine  IV  





  Q5MIN PRN  





  Chest Pain unrelieved by NTG  


 


Nitroglycerin  0.4 mg  05/16/20 23:45 





  Nitrostat  SL  





  .Q5MIN PRN  





  Chest Pain  


 


Ondansetron HCl  4 mg  05/16/20 23:45 





  Zofran  IV  





  Q8H PRN  





  Nausea And Vomiting  


 


Simple Syrup  15 ml  05/20/20 15:33 





  Simple Syrup  FEEDTUBE  





  PRN PRN  





  Hypoglycemia  


 


Simple Syrup  30 ml  05/20/20 15:33 





  Simple Syrup  FEEDTUBE  





  PRN PRN  





  Hypoglycemia  


 


Sodium Bicarbonate  325 mg  05/20/20 15:33 





  Sodium Bicarbonate  FEEDTUBE  





  PRN PRN  





  For Clogged Feeding Tube  


 


Sodium Chloride  10 ml  05/17/20 10:00  05/21/20 10:48





  Sodium Chloride Flush Syringe 10 Ml  IV   10 ml





  BID AMANDA   Administration


 


Sodium Chloride  10 ml  05/16/20 23:45 





  Sodium Chloride Flush Syringe 10 Ml  IV  





  PRN PRN  





  LINE FLUSH  














Nutrition/Malnutrition Assess





- Dietary Evaluation


Nutrition/Malnutrition Findings: 


                                        





Nutrition Notes                                            Start:  05/20/20 

15:23


Freq:                                                      Status: Active       




Protocol:                                                                       




 Document     05/20/20 15:24  LM  (Rec: 05/20/20 15:33  LM  [id number])


 Nutrition Notes


     Need for Assessment generated from:         MD Order


     Initial or Follow up                        Assessment


     Current Diagnosis                           Acute Kidney Injury,COPD,


                                                 Hypertension,Heart Failure


     Other Pertinent Diagnosis                   chest pain, volume overload


     Current Diet                                cardiac


     Labs/Tests                                  Na 129


                                                 BUN 36


                                                 Cr 2.8


                                                 


                                                 Bili 2.3


     Pertinent Medications                       Reviewed


     Height                                      5 ft


     Weight                                      89.7 kg


     Ideal Body Weight (kg)                      45.45


     BMI                                         38.6


     Weight Status                               Obese


     Subjective/Other Information                MD consult for TF. Pt in


                                                 restraints and restlss per RN.


                                                 Pt s/p code met (yesterday).


     Burn                                        Absent


     Trauma                                      Absent


     Minimum of two criteria                     No physical signs of


                                                 malnutrition


     #1


      Nutrition Diagnosis                        Inadequate oral intake


      Etiology                                   SOB


      As Evidenced by Signs and Symptoms         pt requiring TF


     Is patient on ventilator?                   No


     Is Patient Ambulatory and/or Out of Bed     No


     REE-([place]-confined to bed)      1611.840


     Kcal/Kg value to use for calculation        14


     Approximate Energy Requirements Using       1256


      kcal/Kg                                    


     Calculation Used for Recommendations        Kcal/kg


     Additional Notes                            Protein: 54-82g (0.8-1.2g/kg


                                                 using AdjBW 68kg)


                                                 Fluid 1ml/kcal or per MD


 Nutrition Intervention


     Change Diet Order:                          TF


     Nutrition Support:                          Nepro 1.8 at 30ml/hr


                                                 Flush 50ml q4h for


                                                 hyponatremia


                                                 Flush 130ml q4h once


                                                 hyponatremia resolves


     Kcal                                        1,296


     Protein (gm)                                58


     Fluid (mL)                                  523


     Goal #1                                     TF start/tolerance


     Anticipated Discharge Needs:                unable to determine at this


                                                 time


     Follow-Up By:                               05/22/20


     Additional Comments                         F/U for TF start/tolerance, Na

## 2020-05-21 NOTE — PROGRESS NOTE
Assessment and Plan





Elevated troponin


Atrial flutter with variable conduction rate, the chronicity is uncertain 


   on cardizem. sotalol discontinued due to acute renal failure


Dilated cardiomyopathy, uncertain duration


   LVEF 20-25%


Cor-pulmonale


   severe pulmonary hypertension with pulmonary artery systolic pressure of 82, 

consistent with severe cor pulmonale.


Elevated liver enzymes


Acute renal failure


COPD exacerbation


   on home oxygen


Hyponatremia





Continue Diltiazem for persistent atrial flutter.





Subjective


Date of service: 05/21/20


Interval history: 





Patient transferred to CCU overnight, currently on Bipap and pressors for 

support.





Objective


                                   Vital Signs











  Temp Pulse Pulse Pulse Resp Resp BP


 


 05/21/20 11:00   97 H    22   92/62


 


 05/21/20 10:45   111 H    28 H   103/64


 


 05/21/20 10:30   96 H    27 H   98/71


 


 05/21/20 10:15   103 H    28 H   92/70


 


 05/21/20 10:00   95 H    28 H   97/66


 


 05/21/20 09:45   97 H    28 H   91/67


 


 05/21/20 09:30   106 H    28 H   93/68


 


 05/21/20 09:15   111 H    28 H   97/70


 


 05/21/20 09:00   105 H    29 H   91/63


 


 05/21/20 08:46   100 H    28 H   79/53


 


 05/21/20 08:45   98 H    28 H   91/61


 


 05/21/20 08:30   92 H    28 H   105/63


 


 05/21/20 08:15   108 H    28 H   80/62


 


 05/21/20 08:09    112 H    24 


 


 05/21/20 08:00  97.9 F  98 H    28 H   91/61


 


 05/21/20 07:45   111 H    28 H   98/68


 


 05/21/20 07:30   111 H    28 H   97/68


 


 05/21/20 07:15   111 H    28 H   97/68


 


 05/21/20 07:00   107 H    29 H   94/59


 


 05/21/20 06:45   110 H    28 H   104/68


 


 05/21/20 06:30   110 H    28 H   95/57


 


 05/21/20 06:15   111 H    28 H   91/66


 


 05/21/20 06:00   110 H    22   99/73


 


 05/21/20 05:45   111 H    29 H   93/75


 


 05/21/20 05:30   110 H    26 H   101/71


 


 05/21/20 05:15   110 H    27 H   92/64


 


 05/21/20 05:00   110 H    22   88/63


 


 05/21/20 04:46   110 H    21   92/65


 


 05/21/20 04:40   110 H    28 H   92/65


 


 05/21/20 04:30   110 H    28 H   90/51


 


 05/21/20 04:15   110 H    25 H   84/60


 


 05/21/20 04:00  96.9 F L  110 H   110 H  28 H   92/66


 


 05/21/20 03:46   109 H    13   64/48


 


 05/21/20 03:30   110 H    17   96/72


 


 05/21/20 03:15   110 H    28 H   96/72


 


 05/21/20 03:00   110 H    28 H   91/66 05/21/20 02:45   108 H    28 H   91/66 05/21/20 02:30   110 H    28 H   86/64


 


 05/21/20 02:15   110 H    27 H   96/67


 


 05/21/20 02:00   110 H    28 H   86/60


 


 05/21/20 01:45   109 H    25 H   91/66 05/21/20 01:30   109 H    28 H   96/68 05/21/20 01:15   110 H    27 H   96/68


 


 05/21/20 01:00   109 H    28 H   98/74


 


 05/21/20 00:48   109 H      97/72


 


 05/21/20 00:45   109 H    28 H   97/72


 


 05/21/20 00:30   109 H    28 H   89/65


 


 05/21/20 00:16   109 H    28 H   100/62


 


 05/21/20 00:09   109 H    28 H   107/78


 


 05/21/20 00:01   109 H    28 H   107/78


 


 05/21/20 00:00  98.8 F  109 H   109 H  22   98/71


 


 05/20/20 23:52   109 H    25 H   106/75


 


 05/20/20 23:45   109 H    25 H   106/75


 


 05/20/20 23:30   109 H    28 H   98/71


 


 05/20/20 23:16   110 H    28 H   97/65


 


 05/20/20 23:00   109 H    28 H   97/70


 


 05/20/20 22:46   110 H    28 H  


 


 05/20/20 22:30   109 H    29 H   106/68


 


 05/20/20 22:15   109 H    28 H   106/68


 


 05/20/20 22:00   109 H    27 H   105/74


 


 05/20/20 21:45   109 H    28 H   112/68


 


 05/20/20 21:30   109 H    29 H   101/57


 


 05/20/20 21:16   109 H    25 H   104/72


 


 05/20/20 21:00   109 H    29 H   101/72


 


 05/20/20 20:46   109 H    28 H   99/66


 


 05/20/20 20:30   109 H    26 H   104/72


 


 05/20/20 20:25    110 H    24 


 


 05/20/20 20:18   109 H    24   105/59


 


 05/20/20 20:15   109 H    26 H   105/59


 


 05/20/20 20:00  98.9 F  109 H   109 H  29 H   119/100


 


 05/20/20 19:45   109 H    18   104/64


 


 05/20/20 19:30   109 H    26 H   100/75


 


 05/20/20 19:16   109 H    23   55/31


 


 05/20/20 19:00   108 H    20   71/41


 


 05/20/20 18:46   108 H    21   55/31


 


 05/20/20 18:30   107 H    21   84/63


 


 05/20/20 18:16   107 H    22   75/57


 


 05/20/20 18:00   105 H    31 H   87/49


 


 05/20/20 17:45   106 H    31 H   78/36


 


 05/20/20 17:30   106 H    34 H   41/22


 


 05/20/20 17:20       


 


 05/20/20 17:15   107 H    23   49/19


 


 05/20/20 17:00   108 H    31 H   121/55


 


 05/20/20 16:46   108 H    40 H   121/55


 


 05/20/20 16:30   108 H    37 H   121/55


 


 05/20/20 16:21   108 H    41 H  


 


 05/20/20 16:20   108 H    18  


 


 05/20/20 15:36  98.0 F  106 H    20   151/100


 


 05/20/20 15:00    126 H    26 H 


 


 05/20/20 12:05  98.0 F  106 H    18   139/108


 


 05/20/20 12:00   42 L     














  Pulse Ox


 


 05/21/20 11:00  100


 


 05/21/20 10:45  97


 


 05/21/20 10:30  99


 


 05/21/20 10:15  95


 


 05/21/20 10:00  95


 


 05/21/20 09:45  95


 


 05/21/20 09:30  99


 


 05/21/20 09:15  96


 


 05/21/20 09:00 


 


 05/21/20 08:46  97


 


 05/21/20 08:45  96


 


 05/21/20 08:30  92


 


 05/21/20 08:15  92


 


 05/21/20 08:09 


 


 05/21/20 08:00 


 


 05/21/20 07:45  92


 


 05/21/20 07:30  97


 


 05/21/20 07:15 


 


 05/21/20 07:00  95


 


 05/21/20 06:45  94


 


 05/21/20 06:30  96


 


 05/21/20 06:15  95


 


 05/21/20 06:00 


 


 05/21/20 05:45 


 


 05/21/20 05:30 


 


 05/21/20 05:15  95


 


 05/21/20 05:00  94


 


 05/21/20 04:46  95


 


 05/21/20 04:40  93


 


 05/21/20 04:30  93


 


 05/21/20 04:15 


 


 05/21/20 04:00  93


 


 05/21/20 03:46  90


 


 05/21/20 03:30  92


 


 05/21/20 03:15  91


 


 05/21/20 03:00  90


 


 05/21/20 02:45  91


 


 05/21/20 02:30  93


 


 05/21/20 02:15  93


 


 05/21/20 02:00  93


 


 05/21/20 01:45  93


 


 05/21/20 01:30  92


 


 05/21/20 01:15  100


 


 05/21/20 01:00  93


 


 05/21/20 00:48 


 


 05/21/20 00:45  92


 


 05/21/20 00:30  94


 


 05/21/20 00:16  96


 


 05/21/20 00:09  96


 


 05/21/20 00:01  93


 


 05/21/20 00:00  95


 


 05/20/20 23:52  95


 


 05/20/20 23:45  96


 


 05/20/20 23:30  93


 


 05/20/20 23:16  92


 


 05/20/20 23:00  92


 


 05/20/20 22:46  95


 


 05/20/20 22:30  89


 


 05/20/20 22:15  86


 


 05/20/20 22:00 


 


 05/20/20 21:45  92


 


 05/20/20 21:30  94


 


 05/20/20 21:16  96


 


 05/20/20 21:00  99


 


 05/20/20 20:46  98


 


 05/20/20 20:30  98


 


 05/20/20 20:25  97


 


 05/20/20 20:18  98


 


 05/20/20 20:15  96


 


 05/20/20 20:00  929 H


 


 05/20/20 19:45  100


 


 05/20/20 19:30  99


 


 05/20/20 19:16  97


 


 05/20/20 19:00  98


 


 05/20/20 18:46  97


 


 05/20/20 18:30  98


 


 05/20/20 18:16  100


 


 05/20/20 18:00  95


 


 05/20/20 17:45  93


 


 05/20/20 17:30  92


 


 05/20/20 17:20  93


 


 05/20/20 17:15  99


 


 05/20/20 17:00  88


 


 05/20/20 16:46  83 L


 


 05/20/20 16:30  94


 


 05/20/20 16:21  78 L


 


 05/20/20 16:20  95


 


 05/20/20 15:36  95


 


 05/20/20 15:00 


 


 05/20/20 12:05  93


 


 05/20/20 12:00 














- Physical Examination


General: No Apparent Distress


HEENT: Positive: PERRL


Neck: Positive: neck supple


Cardiac: Positive: irregularly irregular


Neuro: Positive: Grossly Intact


Abdomen: Positive: Soft


Skin: Positive: Clear


Extremities: Absent: edema





- Labs and Meds


                                 Cardiac Enzymes











  05/21/20 Range/Units





  05:35 


 


AST  2625 H  (5-40)  units/L








                                       CBC











  05/21/20 Range/Units





  05:35 


 


WBC  15.0 H  (4.5-11.0)  K/mm3


 


RBC  4.00  (3.65-5.03)  M/mm3


 


Hgb  13.5  (10.1-14.3)  gm/dl


 


Hct  42.6  (30.3-42.9)  %


 


Plt Count  56 L  (140-440)  K/mm3








                          Comprehensive Metabolic Panel











  05/21/20 Range/Units





  05:35 


 


Sodium  130 L  (137-145)  mmol/L


 


Potassium  4.7  (3.6-5.0)  mmol/L


 


Chloride  85.2 L  ()  mmol/L


 


Carbon Dioxide  30  (22-30)  mmol/L


 


BUN  49 H  (7-17)  mg/dL


 


Creatinine  3.4 H  (0.7-1.2)  mg/dL


 


Glucose  157 H  ()  mg/dL


 


Calcium  6.7 L  (8.4-10.2)  mg/dL


 


AST  2625 H  (5-40)  units/L


 


ALT  1547 H  (7-56)  units/L


 


Alkaline Phosphatase  103  ()  units/L


 


Total Protein  6.2 L  (6.3-8.2)  g/dL


 


Albumin  3.3 L  (3.9-5)  g/dL

## 2020-05-21 NOTE — PROGRESS NOTE
Assessment and Plan





This is a 70 year old woman with CHF, COPD who presents with shortness of 

breath, chest pain now with MILAN, hyponatremia





# Acute Kidney Injury: complex case noted; creatinine on admission 

0.7->1.3->1.9->2.8->3.4 as of this AM.  Suspect this is related to tubular 

injury in setting of hypotension, with changes due to fluid shifting with di

uretic therapy and cardiorenal physiology.  Now off sotalol. Need to be cautious

with diuretic therapy and defer to pulm/cardiology to use prn based on 

respiratory status.  No indication for renal replacement therapy at this time, 

will follow closely given clinical status; is at high risk for requiring renal 

replacement therapy.


- daily renal labs


- avoid nephrotoxins


- low salt diet


- strict Is/Os


- maintain MAP >70, continue pressors prn 


- supportive measures per ICU





# Hyponatremia: sodium has been labile, but suspect hyponatremia related to 

volume overload, improved this AM to 130.  Ok to use furosemide for volume prn 

as noted above; furosemide should promote more water loss than sodium, but still

need to monitor closelyl for worsening of hyponatremia





# Metabolic Alkalosis: suspect related to underlying pulmonary disease; will 

worsen with excessive diuretic use, stable this AM





# NSTEMI/ CHF/ atrial flutter: appreciate cardiology input





# Shortness of Breath, Respiratory Distress, Pulmonary Hypertension, COPD: 

appreciate pulmonary input





# Anemia of chronic disease 





Thank you for this interesting consult; we will continue to follow closely with 

a guarded renal prognosis.











Subjective


Date of service: 05/21/20


Interval history: 





Transferred to ICU, now on Bipap and pressors.  Remains altered.





Objective





- Exam


Narrative Exam: 





Constitutional: mild acute distress, laying in bed on Bipap, minimally 

responsive


Head: NC/AT


Neck: supple


Lungs: on Bipap, coarse breath sounds


CV: RRR, no M/R/G


Abdomen: soft, non-tender, bowel sounds present


Back: nontender


Extremities: no edema, pulses WNL


Skin: intact


Neuro: confused





- Vital Signs


Vital signs: 


                               Vital Signs - 12hr











  05/21/20 05/21/20 05/21/20





  01:30 01:45 02:00


 


Temperature   


 


Pulse Rate 109 H 109 H 110 H


 


Pulse Rate [   





Bilateral   





Throughout]   


 


Pulse Rate [   





From Monitor]   


 


Respiratory 28 H 25 H 28 H





Rate   


 


Respiratory   





Rate [Bilateral   





Throughout]   


 


Blood Pressure 96/68 91/66 86/60


 


O2 Sat by Pulse 92 93 93





Oximetry   














  05/21/20 05/21/20 05/21/20





  02:15 02:30 02:45


 


Temperature   


 


Pulse Rate 110 H 110 H 108 H


 


Pulse Rate [   





Bilateral   





Throughout]   


 


Pulse Rate [   





From Monitor]   


 


Respiratory 27 H 28 H 28 H





Rate   


 


Respiratory   





Rate [Bilateral   





Throughout]   


 


Blood Pressure 96/67 86/64 91/66


 


O2 Sat by Pulse 93 93 91





Oximetry   














  05/21/20 05/21/20 05/21/20





  03:00 03:15 03:30


 


Temperature   


 


Pulse Rate 110 H 110 H 110 H


 


Pulse Rate [   





Bilateral   





Throughout]   


 


Pulse Rate [   





From Monitor]   


 


Respiratory 28 H 28 H 17





Rate   


 


Respiratory   





Rate [Bilateral   





Throughout]   


 


Blood Pressure 91/66 96/72 96/72


 


O2 Sat by Pulse 90 91 92





Oximetry   














  05/21/20 05/21/20 05/21/20





  03:46 04:00 04:15


 


Temperature  96.9 F L 


 


Pulse Rate 109 H 110 H 110 H


 


Pulse Rate [   





Bilateral   





Throughout]   


 


Pulse Rate [  110 H 





From Monitor]   


 


Respiratory 13 28 H 25 H





Rate   


 


Respiratory   





Rate [Bilateral   





Throughout]   


 


Blood Pressure 64/48 92/66 84/60


 


O2 Sat by Pulse 90 93 





Oximetry   














  05/21/20 05/21/20 05/21/20





  04:30 04:40 04:46


 


Temperature   


 


Pulse Rate 110 H 110 H 110 H


 


Pulse Rate [   





Bilateral   





Throughout]   


 


Pulse Rate [   





From Monitor]   


 


Respiratory 28 H 28 H 21





Rate   


 


Respiratory   





Rate [Bilateral   





Throughout]   


 


Blood Pressure 90/51 92/65 92/65


 


O2 Sat by Pulse 93 93 95





Oximetry   














  05/21/20 05/21/20 05/21/20





  05:00 05:15 05:30


 


Temperature   


 


Pulse Rate 110 H 110 H 110 H


 


Pulse Rate [   





Bilateral   





Throughout]   


 


Pulse Rate [   





From Monitor]   


 


Respiratory 22 27 H 26 H





Rate   


 


Respiratory   





Rate [Bilateral   





Throughout]   


 


Blood Pressure 88/63 92/64 101/71


 


O2 Sat by Pulse 94 95 





Oximetry   














  05/21/20 05/21/20 05/21/20





  05:45 06:00 06:15


 


Temperature   


 


Pulse Rate 111 H 110 H 111 H


 


Pulse Rate [   





Bilateral   





Throughout]   


 


Pulse Rate [   





From Monitor]   


 


Respiratory 29 H 22 28 H





Rate   


 


Respiratory   





Rate [Bilateral   





Throughout]   


 


Blood Pressure 93/75 99/73 91/66


 


O2 Sat by Pulse   95





Oximetry   














  05/21/20 05/21/20 05/21/20





  06:30 06:45 07:00


 


Temperature   


 


Pulse Rate 110 H 110 H 107 H


 


Pulse Rate [   





Bilateral   





Throughout]   


 


Pulse Rate [   





From Monitor]   


 


Respiratory 28 H 28 H 29 H





Rate   


 


Respiratory   





Rate [Bilateral   





Throughout]   


 


Blood Pressure 95/57 104/68 94/59


 


O2 Sat by Pulse 96 94 95





Oximetry   














  05/21/20 05/21/20 05/21/20





  07:15 07:30 07:45


 


Temperature   


 


Pulse Rate 111 H 111 H 111 H


 


Pulse Rate [   





Bilateral   





Throughout]   


 


Pulse Rate [   





From Monitor]   


 


Respiratory 28 H 28 H 28 H





Rate   


 


Respiratory   





Rate [Bilateral   





Throughout]   


 


Blood Pressure 97/68 97/68 98/68


 


O2 Sat by Pulse  97 92





Oximetry   














  05/21/20 05/21/20 05/21/20





  08:00 08:09 08:15


 


Temperature 97.9 F  


 


Pulse Rate 98 H  108 H


 


Pulse Rate [  112 H 





Bilateral   





Throughout]   


 


Pulse Rate [ 104 H  





From Monitor]   


 


Respiratory 28 H  28 H





Rate   


 


Respiratory  24 





Rate [Bilateral   





Throughout]   


 


Blood Pressure 91/61  80/62


 


O2 Sat by Pulse 97  92





Oximetry   














  05/21/20 05/21/20 05/21/20





  08:30 08:45 08:46


 


Temperature   


 


Pulse Rate 92 H 98 H 100 H


 


Pulse Rate [   





Bilateral   





Throughout]   


 


Pulse Rate [   





From Monitor]   


 


Respiratory 28 H 28 H 28 H





Rate   


 


Respiratory   





Rate [Bilateral   





Throughout]   


 


Blood Pressure 105/63 91/61 79/53


 


O2 Sat by Pulse 92 96 97





Oximetry   














  05/21/20 05/21/20 05/21/20





  09:00 09:15 09:30


 


Temperature   


 


Pulse Rate 105 H 111 H 106 H


 


Pulse Rate [   





Bilateral   





Throughout]   


 


Pulse Rate [   





From Monitor]   


 


Respiratory 29 H 28 H 28 H





Rate   


 


Respiratory   





Rate [Bilateral   





Throughout]   


 


Blood Pressure 91/63 97/70 93/68


 


O2 Sat by Pulse  96 99





Oximetry   














  05/21/20 05/21/20 05/21/20





  09:45 10:00 10:15


 


Temperature   


 


Pulse Rate 97 H 95 H 103 H


 


Pulse Rate [   





Bilateral   





Throughout]   


 


Pulse Rate [   





From Monitor]   


 


Respiratory 28 H 28 H 28 H





Rate   


 


Respiratory   





Rate [Bilateral   





Throughout]   


 


Blood Pressure 91/67 97/66 92/70


 


O2 Sat by Pulse 95 95 95





Oximetry   














  05/21/20 05/21/20 05/21/20





  10:30 10:45 11:00


 


Temperature   


 


Pulse Rate 96 H 111 H 97 H


 


Pulse Rate [   





Bilateral   





Throughout]   


 


Pulse Rate [   





From Monitor]   


 


Respiratory 27 H 28 H 22





Rate   


 


Respiratory   





Rate [Bilateral   





Throughout]   


 


Blood Pressure 98/71 103/64 92/62


 


O2 Sat by Pulse 99 97 100





Oximetry   














  05/21/20 05/21/20 05/21/20





  11:15 11:30 11:37


 


Temperature   


 


Pulse Rate 103 H 96 H 97 H


 


Pulse Rate [   





Bilateral   





Throughout]   


 


Pulse Rate [   





From Monitor]   


 


Respiratory 22 23 28 H





Rate   


 


Respiratory   





Rate [Bilateral   





Throughout]   


 


Blood Pressure 95/65 104/71 92/62


 


O2 Sat by Pulse 96 95 100





Oximetry   














  05/21/20 05/21/20 05/21/20





  11:46 12:00 12:15


 


Temperature  97.9 F 


 


Pulse Rate 105 H 94 H 100 H


 


Pulse Rate [   





Bilateral   





Throughout]   


 


Pulse Rate [   





From Monitor]   


 


Respiratory 28 H 28 H 28 H





Rate   


 


Respiratory   





Rate [Bilateral   





Throughout]   


 


Blood Pressure 84/61 94/64 86/59


 


O2 Sat by Pulse 95 95 95





Oximetry   














  05/21/20 05/21/20 05/21/20





  12:30 12:45 13:00


 


Temperature   


 


Pulse Rate 91 H 97 H 98 H


 


Pulse Rate [   





Bilateral   





Throughout]   


 


Pulse Rate [   





From Monitor]   


 


Respiratory 28 H 28 H 28 H





Rate   


 


Respiratory   





Rate [Bilateral   





Throughout]   


 


Blood Pressure 95/66 84/61 95/64


 


O2 Sat by Pulse 96 95 





Oximetry   














  05/21/20





  13:16


 


Temperature 


 


Pulse Rate 98 H


 


Pulse Rate [ 





Bilateral 





Throughout] 


 


Pulse Rate [ 





From Monitor] 


 


Respiratory 28 H





Rate 


 


Respiratory 





Rate [Bilateral 





Throughout] 


 


Blood Pressure 84/60


 


O2 Sat by Pulse 96





Oximetry 














- Lab





                                 05/21/20 05:35





                                 05/21/20 05:35


                             Most recent lab results











ABG pH  7.230 pH Units (7.350-7.450)  L  05/21/20  00:08    


 


ABG pCO2  71.5 mm Hg  05/21/20  00:08    


 


ABG pO2  79.8 mm Hg (80.0-90.0)  L  05/21/20  00:08    


 


ABG HCO3  29.2 mmol/L (20.0-26.0)  H  05/21/20  00:08    


 


ABG O2 Saturation  94.3 % (95.0-99.0)  L  05/21/20  00:08    


 


Calcium  6.7 mg/dL (8.4-10.2)  L  05/21/20  05:35    


 


Phosphorus  7.00 mg/dL (2.5-4.5)  H  05/21/20  05:35    


 


Magnesium  1.90 mg/dL (1.7-2.3)   05/21/20  05:35    


 


Urine Creatinine  216.1 mg/dL (0.1-20.0)  H  05/19/20  Unknown


 


Urine Sodium  10 mmol/L  05/19/20  Unknown


 


Urine Total Protein  62 mg/dL (5-11.8)  H  05/19/20  Unknown














Medications & Allergies





- Medications


Allergies/Adverse Reactions: 


                                    Allergies





No Known Allergies Allergy (Unverified 05/13/18 11:23)


   








Home Medications: 


                                Home Medications











 Medication  Instructions  Recorded  Confirmed  Last Taken  Type


 


No Known Home Medications [No  05/16/20 05/16/20 Unknown History





Reported Home Medications]     











Active Medications: 














Generic Name Dose Route Start Last Admin





  Trade Name Freq  PRN Reason Stop Dose Admin


 


Acetaminophen  650 mg  05/16/20 23:45 





  Tylenol  PO  





  Q4H PRN  





  Pain MILD(1-3)/Fever >100.5/HA  


 


Albuterol  2.5 mg  05/17/20 14:57 





  Proventil  IH  





  Q4HRT PRN  





  Shortness Of Breath  


 


Albuterol/Ipratropium  1 ampul  05/17/20 20:00  05/21/20 08:09





  Duoneb *Not For Prn Use*  IH   1 ampul





  TIDRT AMANDA   Administration


 


Lipase/Protease/Amylase  1 each  05/20/20 15:33 





  Pancreaze Dr 10,500 Unit  FEEDTUBE  





  PRN PRN  





  For Clogged Feeding Tube  


 


Arformoterol Tartrate  15 mcg  05/17/20 20:00  05/21/20 08:09





  Brovana Nebu  IH   15 mcg





  Q12HRT AMANDA   Administration


 


Aspirin  325 mg  05/17/20 10:00  05/20/20 10:42





  Ecotrin  PO   325 mg





  QDAY AMANDA   Administration


 


Atorvastatin Calcium  40 mg  05/17/20 22:00  05/20/20 21:11





  Lipitor  PO   Not Given





  QHS AMANDA  


 


Budesonide  0.5 mg  05/17/20 20:00  05/21/20 08:09





  Pulmicort  IH   0.5 mg





  Q12HRT AMANDA   Administration


 


Dextrose  50 ml  05/20/20 17:44 





  D50w (25gm) Syringe  IV  





  Q30MIN PRN  





  Hypoglycemia  





  Protocol  


 


Diltiazem HCl  30 mg  05/17/20 18:00  05/21/20 13:16





  Cardizem  PO   Not Given





  Q6HR AMANDA  


 


Famotidine  20 mg  05/21/20 10:00  05/21/20 10:48





  Pepcid  IV   20 mg





  QDAY AMANDA   Administration


 


Heparin Sodium/Sodium Chloride  25,000 unit in 500 mls @ 20 mls/hr  05/17/20 

06:00  05/21/20 00:48





  Heparin/ 0.45% Nacl-25,000 Unit/500 Ml  IV   1,650 units/hr





  TITRATE AMANDA   33 mls/hr





    Administration





  Protocol  





  1,000 UNITS/HR  


 


Dextrose/Sodium Chloride  1,000 mls @ 75 mls/hr  05/20/20 18:00  05/21/20 10:50





  D5ns  IV   75 mls/hr





  AS DIRECT AMANDA   Administration


 


Norepinephrine  4 mg in 250 mls @ 7.5 mls/hr  05/20/20 18:00  05/21/20 11:30





  Levophed Drip 4 Mg/Ns 250 Ml  IV   2 mcg/min





  TITR AMANDA   7.5 mls/hr





    Titration





  Protocol  





  2 MCG/MIN  


 


Vasopressin 20 unit/ Sodium  101 mls @ 9.09 mls/hr  05/20/20 18:00  05/20/20 

18:30





  Chloride  IV   0 units/min





  TITR AMANDA   0 mls/hr





    Titration





  Protocol  





  0.03 UNITS/MIN  


 


Magnesium Hydroxide  30 ml  05/16/20 23:45 





  Milk Of Magnesia  PO  





  Q4H PRN  





  Constipation  


 


Methylprednisolone Sodium Succinate  40 mg  05/21/20 10:00  05/21/20 10:48





  Solu-Medrol  IV   40 mg





  Q12HR AMANDA   Administration


 


Morphine Sulfate  2 mg  05/16/20 23:45 





  Morphine  IV  





  Q5MIN PRN  





  Chest Pain unrelieved by NTG  


 


Nitroglycerin  0.4 mg  05/16/20 23:45 





  Nitrostat  SL  





  .Q5MIN PRN  





  Chest Pain  


 


Ondansetron HCl  4 mg  05/16/20 23:45 





  Zofran  IV  





  Q8H PRN  





  Nausea And Vomiting  


 


Simple Syrup  15 ml  05/20/20 15:33 





  Simple Syrup  FEEDTUBE  





  PRN PRN  





  Hypoglycemia  


 


Simple Syrup  30 ml  05/20/20 15:33 





  Simple Syrup  FEEDTUBE  





  PRN PRN  





  Hypoglycemia  


 


Sodium Bicarbonate  325 mg  05/20/20 15:33 





  Sodium Bicarbonate  FEEDTUBE  





  PRN PRN  





  For Clogged Feeding Tube  


 


Sodium Chloride  10 ml  05/17/20 10:00  05/21/20 10:48





  Sodium Chloride Flush Syringe 10 Ml  IV   10 ml





  BID AMANDA   Administration


 


Sodium Chloride  10 ml  05/16/20 23:45 





  Sodium Chloride Flush Syringe 10 Ml  IV  





  PRN PRN  





  LINE FLUSH

## 2020-05-22 LAB
ALBUMIN SERPL-MCNC: 2.9 G/DL (ref 3.9–5)
ALT SERPL-CCNC: 1094 UNITS/L (ref 7–56)
ANISOCYTOSIS BLD QL SMEAR: (no result)
BAND NEUTROPHILS # (MANUAL): 0.1 K/MM3
BUN SERPL-MCNC: 61 MG/DL (ref 7–17)
BUN/CREAT SERPL: 21 %
CALCIUM SERPL-MCNC: 6.9 MG/DL (ref 8.4–10.2)
HCO3 BLDA-SCNC: 27.2 MMOL/L (ref 20–26)
HCT VFR BLD CALC: 43.6 % (ref 30.3–42.9)
HEMOLYSIS INDEX: 30
HGB BLD-MCNC: 13.8 GM/DL (ref 10.1–14.3)
MACROCYTES BLD QL SMEAR: (no result)
MCHC RBC AUTO-ENTMCNC: 32 % (ref 30–34)
MCV RBC AUTO: 104 FL (ref 79–97)
MYELOCYTES # (MANUAL): 0 K/MM3
PCO2 BLDA: 55.3 MM HG
PH BLDA: 7.31 PH UNITS (ref 7.35–7.45)
PLATELET # BLD: 44 K/MM3 (ref 140–440)
PO2 BLDA: 73.7 MM HG (ref 80–90)
PROMYELOCYTES # (MANUAL): 0 K/MM3
RBC # BLD AUTO: 4.2 M/MM3 (ref 3.65–5.03)
TOTAL CELLS COUNTED BLD: 100

## 2020-05-22 PROCEDURE — 5A09357 ASSISTANCE WITH RESPIRATORY VENTILATION, LESS THAN 24 CONSECUTIVE HOURS, CONTINUOUS POSITIVE AIRWAY PRESSURE: ICD-10-PCS | Performed by: INTERNAL MEDICINE

## 2020-05-22 RX ADMIN — Medication SCH ML: at 23:30

## 2020-05-22 RX ADMIN — BUDESONIDE SCH MG: 0.5 INHALANT RESPIRATORY (INHALATION) at 19:30

## 2020-05-22 RX ADMIN — Medication SCH ML: at 09:35

## 2020-05-22 RX ADMIN — DEXTROSE AND SODIUM CHLORIDE SCH MLS/HR: 5; .9 INJECTION, SOLUTION INTRAVENOUS at 01:05

## 2020-05-22 RX ADMIN — IPRATROPIUM BROMIDE AND ALBUTEROL SULFATE SCH AMPUL: .5; 3 SOLUTION RESPIRATORY (INHALATION) at 08:28

## 2020-05-22 RX ADMIN — IPRATROPIUM BROMIDE AND ALBUTEROL SULFATE SCH: .5; 3 SOLUTION RESPIRATORY (INHALATION) at 19:30

## 2020-05-22 RX ADMIN — ARFORMOTEROL TARTRATE SCH MCG: 15 SOLUTION RESPIRATORY (INHALATION) at 08:28

## 2020-05-22 RX ADMIN — METHYLPREDNISOLONE SODIUM SUCCINATE SCH MG: 40 INJECTION, POWDER, FOR SOLUTION INTRAMUSCULAR; INTRAVENOUS at 09:35

## 2020-05-22 RX ADMIN — METHYLPREDNISOLONE SODIUM SUCCINATE SCH MG: 40 INJECTION, POWDER, FOR SOLUTION INTRAMUSCULAR; INTRAVENOUS at 23:29

## 2020-05-22 RX ADMIN — FAMOTIDINE SCH MG: 10 INJECTION, SOLUTION INTRAVENOUS at 09:34

## 2020-05-22 RX ADMIN — IPRATROPIUM BROMIDE AND ALBUTEROL SULFATE SCH AMPUL: .5; 3 SOLUTION RESPIRATORY (INHALATION) at 17:14

## 2020-05-22 RX ADMIN — BUDESONIDE SCH MG: 0.5 INHALANT RESPIRATORY (INHALATION) at 08:28

## 2020-05-22 RX ADMIN — ASPIRIN SCH MG: 325 TABLET, COATED ORAL at 09:34

## 2020-05-22 RX ADMIN — ARFORMOTEROL TARTRATE SCH MCG: 15 SOLUTION RESPIRATORY (INHALATION) at 19:30

## 2020-05-22 RX ADMIN — DEXTROSE AND SODIUM CHLORIDE SCH MLS/HR: 5; .9 INJECTION, SOLUTION INTRAVENOUS at 16:08

## 2020-05-22 NOTE — PROGRESS NOTE
Assessment and Plan





This is a 70 year old woman with CHF, COPD who presents with shortness of 

breath, chest pain now with MILAN, hyponatremia





# Acute Kidney Injury: complex case noted; creatinine on admission 

0.7->1.3->1.9->2.8->3.4->2.9 as of this AM.  Suspect this is related to tubular 

injury in setting of hypotension, with changes due to fluid shifting with 

diuretic therapy and cardiorenal physiology.  Off sotalol. Need to be cautious 

with diuretic therapy and defer to pulm/cardiology to use prn based on 

respiratory status.  No indication for renal replacement therapy at this time, 

will follow closely given clinical status; is at high risk for requiring renal 

replacement therapy but stable with improved MAP.


- daily renal labs


- avoid nephrotoxins


- low salt diet


- strict Is/Os


- maintain MAP >70, continue pressors prn 


- supportive measures per ICU





# Hyponatremia: sodium has been labile, but suspect hyponatremia related to 

volume overload, improved this AM to 132.  Ok to use furosemide for volume prn 

as noted above; furosemide should promote more water loss than sodium, but still

need to monitor closely for worsening of hyponatremia





# Respiratory Acidosis: suspect related to underlying pulmonary disease; will 

worsen with excessive diuretic use, improved this AM on Bipap





# NSTEMI/ CHF/ atrial flutter: appreciate cardiology input





# Shortness of Breath, Respiratory Distress, Pulmonary Hypertension, COPD: 

appreciate pulmonary input





# Anemia of chronic disease 





Thank you for this interesting consult; we will continue to follow closely with 

a guarded renal prognosis.











Subjective


Date of service: 05/22/20


Principal diagnosis: Ac hypoxemic and hypercapnic resp failure; AE-COPD; NSTEMI;

MILAN; HFrEF


Interval history: 





Remains in ICU, now on Bipap and pressors.  Remains altered.





Objective





- Exam


Narrative Exam: 





Constitutional: mild acute distress, laying in bed on Bipap, minimally 

responsive


Head: NC/AT


Neck: supple


Lungs: on Bipap, coarse breath sounds


CV: RRR, no M/R/G


Abdomen: soft, non-tender, bowel sounds present


Back: nontender


Extremities: no edema, pulses WNL


Skin: intact


Neuro: confused





- Vital Signs


Vital signs: 


                               Vital Signs - 12hr











  05/22/20 05/22/20 05/22/20





  03:45 04:00 04:11


 


Temperature   


 


Pulse Rate 64 63 73


 


Pulse Rate [   





Bilateral   





Throughout]   


 


Pulse Rate [  73 





From Monitor]   


 


Respiratory 28 H 28 H 28 H





Rate   


 


Respiratory   





Rate [Bilateral   





Throughout]   


 


Blood Pressure 113/78 109/73 109/73


 


O2 Sat by Pulse 94 96 95





Oximetry   














  05/22/20 05/22/20 05/22/20





  04:15 04:30 04:45


 


Temperature   


 


Pulse Rate 75 66 85


 


Pulse Rate [   





Bilateral   





Throughout]   


 


Pulse Rate [   





From Monitor]   


 


Respiratory 22 21 22





Rate   


 


Respiratory   





Rate [Bilateral   





Throughout]   


 


Blood Pressure 108/67 116/68 123/73


 


O2 Sat by Pulse 96 95 94





Oximetry   














  05/22/20 05/22/20 05/22/20





  05:00 05:16 05:30


 


Temperature   


 


Pulse Rate 76 74 58 L


 


Pulse Rate [   





Bilateral   





Throughout]   


 


Pulse Rate [   





From Monitor]   


 


Respiratory 21 28 H 28 H





Rate   


 


Respiratory   





Rate [Bilateral   





Throughout]   


 


Blood Pressure 123/73 96/64 99/64


 


O2 Sat by Pulse 97 96 97





Oximetry   














  05/22/20 05/22/20 05/22/20





  05:45 06:00 06:15


 


Temperature   


 


Pulse Rate 59 L 57 L 67


 


Pulse Rate [   





Bilateral   





Throughout]   


 


Pulse Rate [   





From Monitor]   


 


Respiratory 28 H 28 H 28 H





Rate   


 


Respiratory   





Rate [Bilateral   





Throughout]   


 


Blood Pressure 98/69 98/69 97/63


 


O2 Sat by Pulse 99 98 96





Oximetry   














  05/22/20 05/22/20 05/22/20





  06:30 06:45 07:00


 


Temperature   


 


Pulse Rate 90 70 71


 


Pulse Rate [   





Bilateral   





Throughout]   


 


Pulse Rate [   





From Monitor]   


 


Respiratory 28 H 24 28 H





Rate   


 


Respiratory   





Rate [Bilateral   





Throughout]   


 


Blood Pressure 104/72 106/66 106/66


 


O2 Sat by Pulse 96 95 96





Oximetry   














  05/22/20 05/22/20 05/22/20





  07:15 07:30 07:45


 


Temperature   


 


Pulse Rate 80 73 79


 


Pulse Rate [   





Bilateral   





Throughout]   


 


Pulse Rate [   





From Monitor]   


 


Respiratory 28 H 20 28 H





Rate   


 


Respiratory   





Rate [Bilateral   





Throughout]   


 


Blood Pressure 112/70 112/70 113/73


 


O2 Sat by Pulse 97 98 97





Oximetry   














  05/22/20 05/22/20 05/22/20





  08:00 09:24 12:00


 


Temperature 98.8 F  97.7 F


 


Pulse Rate 77  


 


Pulse Rate [ 92 H  





Bilateral   





Throughout]   


 


Pulse Rate [ 77  78





From Monitor]   


 


Respiratory 20  20





Rate   


 


Respiratory 29 H  





Rate [Bilateral   





Throughout]   


 


Blood Pressure 97/63  


 


O2 Sat by Pulse 96 96 





Oximetry   














- Lab





                                 05/22/20 04:30





                                 05/22/20 04:30


                             Most recent lab results











ABG pH  7.230 pH Units (7.350-7.450)  L  05/21/20  00:08    


 


ABG pCO2  71.5 mm Hg  05/21/20  00:08    


 


ABG pO2  79.8 mm Hg (80.0-90.0)  L  05/21/20  00:08    


 


ABG HCO3  29.2 mmol/L (20.0-26.0)  H  05/21/20  00:08    


 


ABG O2 Saturation  94.3 % (95.0-99.0)  L  05/21/20  00:08    


 


Calcium  6.9 mg/dL (8.4-10.2)  L  05/22/20  04:30    


 


Phosphorus  7.00 mg/dL (2.5-4.5)  H  05/21/20  05:35    


 


Magnesium  1.80 mg/dL (1.7-2.3)   05/22/20  04:30    


 


Urine Creatinine  216.1 mg/dL (0.1-20.0)  H  05/19/20  Unknown


 


Urine Sodium  10 mmol/L  05/19/20  Unknown


 


Urine Total Protein  62 mg/dL (5-11.8)  H  05/19/20  Unknown














Medications & Allergies





- Medications


Allergies/Adverse Reactions: 


                                    Allergies





No Known Allergies Allergy (Unverified 05/13/18 11:23)


   








Home Medications: 


                                Home Medications











 Medication  Instructions  Recorded  Confirmed  Last Taken  Type


 


No Known Home Medications [No  05/16/20 05/16/20 Unknown History





Reported Home Medications]     











Active Medications: 














Generic Name Dose Route Start Last Admin





  Trade Name Ld  PRN Reason Stop Dose Admin


 


Acetaminophen  650 mg  05/16/20 23:45 





  Tylenol  PO  





  Q4H PRN  





  Pain MILD(1-3)/Fever >100.5/HA  


 


Albuterol  2.5 mg  05/17/20 14:57 





  Proventil  IH  





  Q4HRT PRN  





  Shortness Of Breath  


 


Albuterol/Ipratropium  1 ampul  05/17/20 20:00  05/22/20 08:28





  Duoneb *Not For Prn Use*  IH   1 ampul





  TIDRT AMANDA   Administration


 


Lipase/Protease/Amylase  1 each  05/20/20 15:33 





  Pancreaze Dr 10,500 Unit  FEEDTUBE  





  PRN PRN  





  For Clogged Feeding Tube  


 


Arformoterol Tartrate  15 mcg  05/17/20 20:00  05/22/20 08:28





  Brovana Nebu  IH   15 mcg





  Q12HRT AMANDA   Administration


 


Aspirin  325 mg  05/17/20 10:00  05/22/20 09:34





  Ecotrin  PO   325 mg





  QDAY AMANDA   Administration


 


Atorvastatin Calcium  40 mg  05/17/20 22:00  05/21/20 21:53





  Lipitor  PO   40 mg





  QHS AMANDA   Administration


 


Budesonide  0.5 mg  05/17/20 20:00  05/22/20 08:28





  Pulmicort  IH   0.5 mg





  Q12HRT AMANDA   Administration


 


Dextrose  50 ml  05/20/20 17:44 





  D50w (25gm) Syringe  IV  





  Q30MIN PRN  





  Hypoglycemia  





  Protocol  


 


Diltiazem HCl  30 mg  05/17/20 18:00  05/22/20 13:35





  Cardizem  PO   Not Given





  Q6HR AMANDA  


 


Famotidine  20 mg  05/21/20 10:00  05/22/20 09:34





  Pepcid  IV   20 mg





  QDAY AMANDA   Administration


 


Dextrose/Sodium Chloride  1,000 mls @ 75 mls/hr  05/20/20 18:00  05/22/20 01:05





  D5ns  IV   75 mls/hr





  AS DIRECT AMANDA   Administration


 


Norepinephrine  4 mg in 250 mls @ 7.5 mls/hr  05/20/20 18:00  05/21/20 16:00





  Levophed Drip 4 Mg/Ns 250 Ml  IV   0 mcg/min





  TITR AMANDA   0 mls/hr





    Titration





  Protocol  





  2 MCG/MIN  


 


Vasopressin 20 unit/ Sodium  101 mls @ 9.09 mls/hr  05/20/20 18:00  05/20/20 

18:30





  Chloride  IV   0 units/min





  TITR AMANDA   0 mls/hr





    Titration





  Protocol  





  0.03 UNITS/MIN  


 


Magnesium Hydroxide  30 ml  05/16/20 23:45 





  Milk Of Magnesia  PO  





  Q4H PRN  





  Constipation  


 


Methylprednisolone Sodium Succinate  40 mg  05/21/20 10:00  05/22/20 09:35





  Solu-Medrol  IV   40 mg





  Q12HR AMANDA   Administration


 


Morphine Sulfate  2 mg  05/16/20 23:45 





  Morphine  IV  





  Q5MIN PRN  





  Chest Pain unrelieved by NTG  


 


Nitroglycerin  0.4 mg  05/16/20 23:45 





  Nitrostat  SL  





  .Q5MIN PRN  





  Chest Pain  


 


Ondansetron HCl  4 mg  05/16/20 23:45 





  Zofran  IV  





  Q8H PRN  





  Nausea And Vomiting  


 


Simple Syrup  15 ml  05/20/20 15:33 





  Simple Syrup  FEEDTUBE  





  PRN PRN  





  Hypoglycemia  


 


Simple Syrup  30 ml  05/20/20 15:33 





  Simple Syrup  FEEDTUBE  





  PRN PRN  





  Hypoglycemia  


 


Sodium Bicarbonate  325 mg  05/20/20 15:33 





  Sodium Bicarbonate  FEEDTUBE  





  PRN PRN  





  For Clogged Feeding Tube  


 


Sodium Chloride  10 ml  05/17/20 10:00  05/22/20 09:35





  Sodium Chloride Flush Syringe 10 Ml  IV   10 ml





  BID AMANDA   Administration


 


Sodium Chloride  10 ml  05/16/20 23:45 





  Sodium Chloride Flush Syringe 10 Ml  IV  





  PRN PRN  





  LINE FLUSH

## 2020-05-22 NOTE — PROGRESS NOTE
Assessment and Plan





Elevated troponin


Atrial flutter with variable conduction rate, the chronicity is uncertain 


   on cardizem. sotalol discontinued due to acute renal failure


Dilated cardiomyopathy, uncertain duration


   LVEF 20-25%


Cor-pulmonale


   severe pulmonary hypertension with pulmonary artery systolic pressure of 82, 

consistent with severe cor pulmonale.


Elevated liver enzymes


Acute renal failure


COPD exacerbation


   on home oxygen


Hyponatremia


Thrombocytopenia





Continue Diltiazem as tolerated, for persistent atrial flutter.





Subjective


Date of service: 05/22/20


Principal diagnosis: Ac hypoxemic and hypercapnic resp failure; AE-COPD; NSTEMI;

MILAN; HFrEF


Interval history: 





Aflutter with a well controlled rate on telemetry.





Objective


                                   Vital Signs











  Temp Pulse Pulse Pulse Resp Resp BP


 


 05/22/20 09:24       


 


 05/22/20 08:00   77  92 H   29 H  29 H  97/63


 


 05/22/20 06:15   67    28 H   97/63


 


 05/22/20 06:00   57 L    28 H   98/69


 


 05/22/20 05:45   59 L    28 H   98/69


 


 05/22/20 05:30   58 L    28 H   99/64


 


 05/22/20 05:16   74    28 H   96/64


 


 05/22/20 05:00   76    21   123/73


 


 05/22/20 04:45   85    22   123/73


 


 05/22/20 04:30   66    21   116/68


 


 05/22/20 04:15   75    22   108/67


 


 05/22/20 04:11   73    28 H   109/73


 


 05/22/20 04:00   63   73  28 H   109/73


 


 05/22/20 03:45   64    28 H   113/78


 


 05/22/20 03:32  98.9 F      


 


 05/22/20 03:30   69    16   109/69


 


 05/22/20 03:16   65    27 H   104/64


 


 05/22/20 03:00   66    24   111/70


 


 05/22/20 02:45   67    28 H   109/78


 


 05/22/20 02:30   82    24   115/77


 


 05/22/20 02:15   88    22  


 


 05/22/20 02:00   85    27 H   112/80


 


 05/22/20 01:45   84    27 H   103/76


 


 05/22/20 01:30   75    24   104/75


 


 05/22/20 01:15   76    28 H   117/78


 


 05/22/20 01:05   85      116/70


 


 05/22/20 01:00   85    28 H   111/77


 


 05/22/20 00:45   76    28 H   111/77


 


 05/22/20 00:30   90    24   113/77


 


 05/22/20 00:15   85    28 H   111/77


 


 05/22/20 00:06   92 H    35 H   110/69


 


 05/22/20 00:00   85   76  28 H   110/69


 


 05/21/20 23:45   81    28 H   112/70


 


 05/21/20 23:36   75    24   112/81


 


 05/21/20 23:30   86    28 H   112/81


 


 05/21/20 23:15   85    28 H   117/77


 


 05/21/20 23:14  98.4 F      


 


 05/21/20 23:00   84    28 H   117/85


 


 05/21/20 22:45   75    28 H   107/78


 


 05/21/20 22:30   85    28 H   116/74


 


 05/21/20 22:15   80    28 H   112/75


 


 05/21/20 22:00   75    28 H   114/79


 


 05/21/20 21:45   81    25 H   108/74


 


 05/21/20 21:30   75    28 H   111/72


 


 05/21/20 21:15   75    28 H   110/75


 


 05/21/20 21:00   76    24   113/73


 


 05/21/20 20:45   75    28 H   113/73


 


 05/21/20 20:37    75    28 H 


 


 05/21/20 20:33   77    28 H   110/67


 


 05/21/20 20:30   75    28 H   110/67


 


 05/21/20 20:15   75    28 H   111/67


 


 05/21/20 20:00  97.3 F L  75   82  28 H   106/73


 


 05/21/20 19:45   75    28 H   97/65


 


 05/21/20 19:30   75    28 H   99/66


 


 05/21/20 19:16   82    28 H   105/54


 


 05/21/20 19:00   91 H    28 H   115/59


 


 05/21/20 18:46   100 H    28 H   111/79


 


 05/21/20 18:30   110 H    25 H   112/70


 


 05/21/20 18:28   102 H      103/54


 


 05/21/20 18:16   98 H    28 H   103/54


 


 05/21/20 18:00   98 H    28 H   115/86


 


 05/21/20 17:46   90    28 H   105/76


 


 05/21/20 17:30   97 H    28 H   109/77


 


 05/21/20 17:15   98 H    28 H   109/77


 


 05/21/20 17:00   92 H    28 H   98/56


 


 05/21/20 16:46   97 H    28 H   96/61


 


 05/21/20 16:30   94 H    28 H   101/71


 


 05/21/20 16:15   101 H    28 H   95/75


 


 05/21/20 16:00  97.8 F  103 H   95 H  28 H   95/66


 


 05/21/20 15:46   97 H    28 H   96/66


 


 05/21/20 15:30   94 H    28 H   96/66


 


 05/21/20 15:17   112 H    28 H   89/59


 


 05/21/20 15:16   95 H    28 H   104/54


 


 05/21/20 15:00   92 H    27 H   109/57


 


 05/21/20 14:46   97 H    28 H   100/73


 


 05/21/20 14:30   97 H    28 H   91/66


 


 05/21/20 14:15   100 H    22   90/71


 


 05/21/20 14:00   112 H    28 H   89/59


 


 05/21/20 13:45   108 H    16   89/59


 


 05/21/20 13:30   95 H    28 H   88/58


 


 05/21/20 13:28    93 H    28 H 


 


 05/21/20 13:16   98 H    28 H   84/60


 


 05/21/20 13:00   98 H    28 H   95/64


 


 05/21/20 12:45   97 H    28 H   84/61


 


 05/21/20 12:30   91 H    28 H   95/66


 


 05/21/20 12:15   100 H    28 H   86/59


 


 05/21/20 12:00  97.9 F  94 H   99 H  28 H   94/64


 


 05/21/20 11:46   105 H    28 H   84/61


 


 05/21/20 11:37   97 H    28 H   92/62


 


 05/21/20 11:30   96 H    23   104/71


 


 05/21/20 11:15   103 H    22   95/65


 


 05/21/20 11:00   97 H    22   92/62


 


 05/21/20 10:45   111 H    28 H   103/64


 


 05/21/20 10:30   96 H    27 H   98/71














  Pulse Ox


 


 05/22/20 09:24  96


 


 05/22/20 08:00  96


 


 05/22/20 06:15  96


 


 05/22/20 06:00  98


 


 05/22/20 05:45  99


 


 05/22/20 05:30  97


 


 05/22/20 05:16  96


 


 05/22/20 05:00  97


 


 05/22/20 04:45  94


 


 05/22/20 04:30  95


 


 05/22/20 04:15  96


 


 05/22/20 04:11  95


 


 05/22/20 04:00  96


 


 05/22/20 03:45  94


 


 05/22/20 03:32 


 


 05/22/20 03:30  94


 


 05/22/20 03:16  97


 


 05/22/20 03:00  96


 


 05/22/20 02:45  95


 


 05/22/20 02:30  95


 


 05/22/20 02:15  95


 


 05/22/20 02:00  95


 


 05/22/20 01:45  95


 


 05/22/20 01:30  95


 


 05/22/20 01:15  96


 


 05/22/20 01:05 


 


 05/22/20 01:00  96


 


 05/22/20 00:45  95


 


 05/22/20 00:30  95


 


 05/22/20 00:15  97


 


 05/22/20 00:06  95


 


 05/22/20 00:00  95


 


 05/21/20 23:45  96


 


 05/21/20 23:36  95


 


 05/21/20 23:30  95


 


 05/21/20 23:15  94


 


 05/21/20 23:14 


 


 05/21/20 23:00  95


 


 05/21/20 22:45  97


 


 05/21/20 22:30  97


 


 05/21/20 22:15  98


 


 05/21/20 22:00  95


 


 05/21/20 21:45  97


 


 05/21/20 21:30  96


 


 05/21/20 21:15  96


 


 05/21/20 21:00  97


 


 05/21/20 20:45  96


 


 05/21/20 20:37 


 


 05/21/20 20:33  97


 


 05/21/20 20:30  96


 


 05/21/20 20:15  96


 


 05/21/20 20:00  95


 


 05/21/20 19:45  95


 


 05/21/20 19:30  95


 


 05/21/20 19:16  96


 


 05/21/20 19:00  96


 


 05/21/20 18:46  96


 


 05/21/20 18:30  97


 


 05/21/20 18:28 


 


 05/21/20 18:16  96


 


 05/21/20 18:00  97


 


 05/21/20 17:46  97


 


 05/21/20 17:30  96


 


 05/21/20 17:15  97


 


 05/21/20 17:00  96


 


 05/21/20 16:46  97


 


 05/21/20 16:30  96


 


 05/21/20 16:15 


 


 05/21/20 16:00  95


 


 05/21/20 15:46  85


 


 05/21/20 15:30  88


 


 05/21/20 15:17  96


 


 05/21/20 15:16  91


 


 05/21/20 15:00  81 L


 


 05/21/20 14:46  96


 


 05/21/20 14:30  90


 


 05/21/20 14:15  90


 


 05/21/20 14:00  96


 


 05/21/20 13:45  92


 


 05/21/20 13:30  96


 


 05/21/20 13:28 


 


 05/21/20 13:16  96


 


 05/21/20 13:00 


 


 05/21/20 12:45  95


 


 05/21/20 12:30  96


 


 05/21/20 12:15  95


 


 05/21/20 12:00  92


 


 05/21/20 11:46  95


 


 05/21/20 11:37  100


 


 05/21/20 11:30  95


 


 05/21/20 11:15  96


 


 05/21/20 11:00  100


 


 05/21/20 10:45  97


 


 05/21/20 10:30  99














- Physical Examination


Cardiac: Positive: irregularly irregular





- Labs and Meds


                                 Cardiac Enzymes











  05/22/20 Range/Units





  04:30 


 


AST  967 H  (5-40)  units/L








                                       CBC











  05/22/20 Range/Units





  04:30 


 


WBC  12.3 H  (4.5-11.0)  K/mm3


 


RBC  4.20  (3.65-5.03)  M/mm3


 


Hgb  13.8  (10.1-14.3)  gm/dl


 


Hct  43.6 H  (30.3-42.9)  %


 


Plt Count  44 L  (140-440)  K/mm3








                          Comprehensive Metabolic Panel











  05/22/20 Range/Units





  04:30 


 


Sodium  132 L  (137-145)  mmol/L


 


Potassium  4.0  (3.6-5.0)  mmol/L


 


Chloride  88.4 L  ()  mmol/L


 


Carbon Dioxide  27  (22-30)  mmol/L


 


BUN  61 H  (7-17)  mg/dL


 


Creatinine  2.9 H  (0.7-1.2)  mg/dL


 


Glucose  168 H  ()  mg/dL


 


Calcium  6.9 L  (8.4-10.2)  mg/dL


 


AST  967 H  (5-40)  units/L


 


ALT  1094 H  (7-56)  units/L


 


Alkaline Phosphatase  100  ()  units/L


 


Total Protein  6.1 L  (6.3-8.2)  g/dL


 


Albumin  2.9 L  (3.9-5)  g/dL

## 2020-05-22 NOTE — PROGRESS NOTE
Assessment and Plan


Assessment and plan: 


--Acute hypoxic respiratory failure; on BiPAP


Titrate O2 sats to more than 90%, nebulizers


IV steroids, pulmonary following,ABG


Intubate as needed





--Hypotension shock/on vasopressors;


Off vasopressors, reasonable blood pressures


Closely monitor





--Severe pulmonary hypertension; on echo


Continue current management, pulmonary following





--Cor pulmonale/severe pulmonary hypertension;


Management per pulmonary





--Acute exacerbation of COPD; on home oxygen


Oxygen, nebulizers, IV steroids, pulmonary following





--Acute on chronic systolic congestive heart failure; EF 20 to 25%


Continue heart failure medications, input output monitoring


Low-sodium diet, fluid restriction, cardiology following





--Atrial flutter with variable conduction;


On Cardizem and sotalol and heparin drip


Sotalol discontinued due to bradycardia by cardiology





--Acute transaminitis; shock liver /to liver congestion


Due to cardiomyopathy.  And hypotension 


Trending down





--Acute kidney injury; vasomotor nephropathy


Management per nephrology, avoid nephrotoxins





--Hyponatremia; mild improvement


Secondary to fluid overload, current management


Diuretic therapy, follow electrolytes, nephrology following





--Symptomatic hypoglycemia; [5/19/2020]


Received D50, D10 IV fluids, significantly improved


Closely monitor blood sugars adjust as needed





--Metabolic encephalopathy;lethergy


Due to underlying disease process


Treat the underlying cause, supportive care





--Generalized anasarca/edema


Due to cardiomyopathy, severe pulmonary hypertension


renal failure.  Treat the underlying cause





--Morbid obesity; BMI 38.6


Partly due to fluid overload


Continue supportive care





--DVT prophylaxis;


Patient is on heparin drip





--Full CODE STATUS.





Patient has multiple medical problems


Critically ill, poor prognosis





Plan of care reviewed with the patient'S nurse


And intensivist


Tube feeding per protocol





Critical care time 34 minutes











History


Interval history: 


Patient seen and examined at bedside in ICU


Overnight events medications reviewed


Patient is on continuous BiPAP saturating well


In mild distress


Vital signs noted


Patient is off vasopressors








Hospitalist Physical





- Constitutional


Vitals: 


                                        











Temp Pulse Resp BP Pulse Ox


 


 98.9 F   92 H  29 H  97/63   96 


 


 05/22/20 03:32  05/22/20 08:00  05/22/20 08:00  05/22/20 08:00  05/22/20 09:24











General appearance: Present: severe distress, well-nourished, obese (Morbidly 

obese), other (On high flow oxygen, lethergic)





- EENT


Eyes: Present: PERRL, EOM intact





- Neck


Neck: Present: supple, normal ROM





- Respiratory


Respiratory effort: normal


Respiratory: bilateral: diminished, rhonchi, negative: rales, wheezing





- Cardiovascular


Rhythm: regular


Heart Sounds: Present: S1 & S2





- Extremities


Extremities: no ischemia


Extremity abnormal: edema





- Abdominal


General gastrointestinal: soft, non-tender, non-distended, normal bowel sounds





- Integumentary


Integumentary: Present: clear, warm





- Psychiatric


Psychiatric: other (Noncommunicative on BiPAP)





- Neurologic


Neurologic: CNII-XII intact, moves all extremities, other (Noncommunicative)





HEART Score





- HEART Score


EKG: Non-specific


Age: > 65


Risk factors: > 3 risk factors or hx of atherosclerotic disease


Troponin: 


                                        











Troponin T  0.126 ng/mL (0.00-0.029)  H*  05/17/20  05:29    











Troponin: < normal limit





- Critical Actions


Critical Actions: 4-6 pts:12-16.6% risk of adverse cardiac event. Should be 

admitted





Results





- Labs


CBC & Chem 7: 


                                 05/22/20 04:30





                                 05/22/20 04:30


Labs: 


                             Laboratory Last Values











WBC  12.3 K/mm3 (4.5-11.0)  H  05/22/20  04:30    


 


RBC  4.20 M/mm3 (3.65-5.03)   05/22/20  04:30    


 


Hgb  13.8 gm/dl (10.1-14.3)   05/22/20  04:30    


 


Hct  43.6 % (30.3-42.9)  H  05/22/20  04:30    


 


MCV  104 fl (79-97)  H  05/22/20  04:30    


 


MCH  33 pg (28-32)  H  05/22/20  04:30    


 


MCHC  32 % (30-34)   05/22/20  04:30    


 


RDW  20.4 % (13.2-15.2)  H  05/22/20  04:30    


 


Plt Count  44 K/mm3 (140-440)  L  05/22/20  04:30    


 


Lymph % (Auto)  16.4 % (13.4-35.0)   05/17/20  03:25    


 


Mono % (Auto)  5.8 % (0.0-7.3)   05/17/20  03:25    


 


Eos % (Auto)  0.3 % (0.0-4.3)   05/17/20  03:25    


 


Baso % (Auto)  0.5 % (0.0-1.8)   05/17/20  03:25    


 


Lymph #  1.4 K/mm3 (1.2-5.4)   05/17/20  03:25    


 


Mono #  0.5 K/mm3 (0.0-0.8)   05/17/20  03:25    


 


Eos #  0.0 K/mm3 (0.0-0.4)   05/17/20  03:25    


 


Baso #  0.0 K/mm3 (0.0-0.1)   05/17/20  03:25    


 


Add Manual Diff  Complete   05/22/20  04:30    


 


Total Counted  100   05/22/20  04:30    


 


Seg Neutrophils %  Np   05/22/20  04:30    


 


Seg Neuts % (Manual)  89.0 % (40.0-70.0)  H  05/22/20  04:30    


 


Band Neutrophils %  1.0 %  05/22/20  04:30    


 


Lymphocytes % (Manual)  7.0 % (13.4-35.0)  L  05/22/20  04:30    


 


Reactive Lymphs % (Man)  0 %  05/22/20  04:30    


 


Monocytes % (Manual)  3.0 % (0.0-7.3)   05/22/20  04:30    


 


Eosinophils % (Manual)  0 % (0.0-4.3)   05/22/20  04:30    


 


Basophils % (Manual)  0 % (0.0-1.8)   05/22/20  04:30    


 


Metamyelocytes %  0 %  05/22/20  04:30    


 


Myelocytes %  0 %  05/22/20  04:30    


 


Promyelocytes %  0 %  05/22/20  04:30    


 


Blast Cells %  0 %  05/22/20  04:30    


 


Nucleated RBC %  Not Reportable   05/22/20  04:30    


 


Seg Neutrophils #  6.7 K/mm3 (1.8-7.7)   05/17/20  03:25    


 


Seg Neutrophils # Man  10.9 K/mm3 (1.8-7.7)  H  05/22/20  04:30    


 


Band Neutrophils #  0.1 K/mm3  05/22/20  04:30    


 


Lymphocytes # (Manual)  0.9 K/mm3 (1.2-5.4)  L  05/22/20  04:30    


 


Abs React Lymphs (Man)  0.0 K/mm3  05/22/20  04:30    


 


Monocytes # (Manual)  0.4 K/mm3 (0.0-0.8)   05/22/20  04:30    


 


Eosinophils # (Manual)  0.0 K/mm3 (0.0-0.4)   05/22/20  04:30    


 


Basophils # (Manual)  0.0 K/mm3 (0.0-0.1)   05/22/20  04:30    


 


Metamyelocytes #  0.0 K/mm3  05/22/20  04:30    


 


Myelocytes #  0.0 K/mm3  05/22/20  04:30    


 


Promyelocytes #  0.0 K/mm3  05/22/20  04:30    


 


Blast Cells #  0.0 K/mm3  05/22/20  04:30    


 


WBC Morphology  Not Reportable   05/22/20  04:30    


 


Hypersegmented Neuts  Not Reportable   05/22/20  04:30    


 


Hyposegmented Neuts  Not Reportable   05/22/20  04:30    


 


Hypogranular Neuts  Not Reportable   05/22/20  04:30    


 


Smudge Cells  Not Reportable   05/22/20  04:30    


 


Toxic Granulation  Not Reportable   05/22/20  04:30    


 


Toxic Vacuolation  Not Reportable   05/22/20  04:30    


 


Dohle Bodies  Not Reportable   05/22/20  04:30    


 


Pelger-Huet Anomaly  Not Reportable   05/22/20  04:30    


 


Chitra Rods  Not Reportable   05/22/20  04:30    


 


Platelet Estimate  Consistent w auto   05/22/20  04:30    


 


Clumped Platelets  Not Reportable   05/22/20  04:30    


 


Plt Clumps, EDTA  Not Reportable   05/22/20  04:30    


 


Large Platelets  Not Reportable   05/22/20  04:30    


 


Giant Platelets  Not Reportable   05/22/20  04:30    


 


Platelet Satelliting  Not Reportable   05/22/20  04:30    


 


Plt Morphology Comment  Not Reportable   05/22/20  04:30    


 


RBC Morphology  Not Reportable   05/22/20  04:30    


 


Dimorphic RBCs  Not Reportable   05/22/20  04:30    


 


Polychromasia  Not Reportable   05/22/20  04:30    


 


Hypochromasia  Not Reportable   05/22/20  04:30    


 


Poikilocytosis  Not Reportable   05/22/20  04:30    


 


Anisocytosis  1+   05/22/20  04:30    


 


Microcytosis  Not Reportable   05/22/20  04:30    


 


Macrocytosis  1+   05/22/20  04:30    


 


Spherocytes  Few   05/22/20  04:30    


 


Pappenheimer Bodies  Not Reportable   05/22/20  04:30    


 


Sickle Cells  Not Reportable   05/22/20  04:30    


 


Target Cells  Not Reportable   05/22/20  04:30    


 


Tear Drop Cells  Not Reportable   05/22/20  04:30    


 


Ovalocytes  Few   05/22/20  04:30    


 


Helmet Cells  Not Reportable   05/22/20  04:30    


 


Yeager-Jarales Bodies  Not Reportable   05/22/20  04:30    


 


Cabot Rings  Not Reportable   05/22/20  04:30    


 


Riley Cells  Few   05/22/20  04:30    


 


Bite Cells  Not Reportable   05/22/20  04:30    


 


Crenated Cell  Not Reportable   05/22/20  04:30    


 


Elliptocytes  Not Reportable   05/22/20  04:30    


 


Acanthocytes (Spur)  Not Reportable   05/22/20  04:30    


 


Rouleaux  Not Reportable   05/22/20  04:30    


 


Hemoglobin C Crystals  Not Reportable   05/22/20  04:30    


 


Schistocytes  Not Reportable   05/22/20  04:30    


 


Malaria parasites  Not Reportable   05/22/20  04:30    


 


Wes Bodies  Not Reportable   05/22/20  04:30    


 


Hem Pathologist Commnt  No   05/22/20  04:30    


 


PT  15.3 Sec. (12.2-14.9)  H  05/17/20  03:25    


 


INR  1.20  (0.87-1.13)  H  05/17/20  03:25    


 


APTT  29.0 Sec. (24.2-36.6)   05/17/20  03:25    


 


Heparin Anti-Xa Level  0.12 U.I./ml (0.3-0.7)  L  05/20/20  Unknown


 


ABG pH  7.230 pH Units (7.350-7.450)  L  05/21/20  00:08    


 


ABG pCO2  71.5 mm Hg  05/21/20  00:08    


 


ABG pO2  79.8 mm Hg (80.0-90.0)  L  05/21/20  00:08    


 


ABG HCO3  29.2 mmol/L (20.0-26.0)  H  05/21/20  00:08    


 


ABG O2 Saturation  94.3 % (95.0-99.0)  L  05/21/20  00:08    


 


ABG O2 Content  18.0  (0.0-44)   05/21/20  00:08    


 


ABG Base Excess  -0.2 mmol/L (-2.0-3.0)   05/21/20  00:08    


 


ABG Hemoglobin  13.9 gm/dl (12.0-16.0)   05/21/20  00:08    


 


ABG Carboxyhemoglobin  1.7 % (0.0-5.0)   05/21/20  00:08    


 


ABG Methemoglobin  0.5 % (0.0-1.5)   05/21/20  00:08    


 


Oxyhemoglobin  92.3 % (95.0-99.0)  L  05/21/20  00:08    


 


FiO2  75 %  05/21/20  00:08    


 


Sodium  132 mmol/L (137-145)  L  05/22/20  04:30    


 


Potassium  4.0 mmol/L (3.6-5.0)   05/22/20  04:30    


 


Chloride  88.4 mmol/L ()  L  05/22/20  04:30    


 


Carbon Dioxide  27 mmol/L (22-30)   05/22/20  04:30    


 


Anion Gap  21 mmol/L  05/22/20  04:30    


 


BUN  61 mg/dL (7-17)  H  05/22/20  04:30    


 


Creatinine  2.9 mg/dL (0.7-1.2)  H  05/22/20  04:30    


 


Estimated GFR  19 ml/min  05/22/20  04:30    


 


BUN/Creatinine Ratio  21 %  05/22/20  04:30    


 


Glucose  168 mg/dL ()  H  05/22/20  04:30    


 


POC Glucose  179  ()  H  05/22/20  06:16    


 


Calcium  6.9 mg/dL (8.4-10.2)  L  05/22/20  04:30    


 


Phosphorus  7.00 mg/dL (2.5-4.5)  H  05/21/20  05:35    


 


Magnesium  1.80 mg/dL (1.7-2.3)   05/22/20  04:30    


 


Total Bilirubin  1.80 mg/dL (0.1-1.2)  H  05/22/20  04:30    


 


AST  967 units/L (5-40)  H  05/22/20  04:30    


 


ALT  1094 units/L (7-56)  H  05/22/20  04:30    


 


Alkaline Phosphatase  100 units/L ()   05/22/20  04:30    


 


Total Creatine Kinase  112 units/L ()   05/18/20  16:23    


 


CK-MB (CK-2)  3.5 ng/mL (0.0-4.0)   05/18/20  16:23    


 


Troponin T  0.126 ng/mL (0.00-0.029)  H*  05/17/20  05:29    


 


NT-Pro-B Natriuret Pep  3719 pg/mL (0-900)  H  05/16/20  22:13    


 


Total Protein  6.1 g/dL (6.3-8.2)  L  05/22/20  04:30    


 


Albumin  2.9 g/dL (3.9-5)  L  05/22/20  04:30    


 


Albumin/Globulin Ratio  0.9 %  05/22/20  04:30    


 


Triglycerides  68 mg/dL (2-149)   05/17/20  00:12    


 


Cholesterol  83 mg/dL ()   05/17/20  00:12    


 


LDL Cholesterol Direct  33 mg/dL ()  L  05/17/20  00:12    


 


HDL Cholesterol  49 mg/dL (40-59)   05/17/20  00:12    


 


Cholesterol/HDL Ratio  1.69 %  05/17/20  00:12    


 


Urine Color  Mandi  (Yellow)   05/19/20  Unknown


 


Urine Turbidity  Slightly-cloudy  (Clear)   05/19/20  Unknown


 


Urine pH  5.0  (5.0-7.0)   05/19/20  Unknown


 


Ur Specific Gravity  1.014  (1.003-1.030)   05/19/20  Unknown


 


Urine Protein  30 mg/dl mg/dL (Negative)   05/19/20  Unknown


 


Urine Glucose (UA)  Neg mg/dL (Negative)   05/19/20  Unknown


 


Urine Ketones  Neg mg/dL (Negative)   05/19/20  Unknown


 


Urine Blood  Neg  (Negative)   05/19/20  Unknown


 


Urine Nitrite  Neg  (Negative)   05/19/20  Unknown


 


Urine Bilirubin  Neg  (Negative)   05/19/20  Unknown


 


Urine Urobilinogen  2.0 mg/dL (<2.0)   05/19/20  Unknown


 


Ur Leukocyte Esterase  Sm  (Negative)   05/19/20  Unknown


 


Urine WBC (Auto)  2.0 /HPF (0.0-6.0)   05/19/20  Unknown


 


Urine RBC (Auto)  2.0 /HPF (0.0-6.0)   05/19/20  Unknown


 


U Epithel Cells (Auto)  2.0 /HPF (0-13.0)   05/19/20  Unknown


 


Urine Bacteria (Auto)  1+ /HPF (Negative)   05/19/20  Unknown


 


Urine Osmolality  319 Mosm/kg  05/19/20  Unknown


 


Urine Creatinine  216.1 mg/dL (0.1-20.0)  H  05/19/20  Unknown


 


Protein/Creatinin Ratio  0.29   05/19/20  Unknown


 


Urine Sodium  10 mmol/L  05/19/20  Unknown


 


Urine Total Protein  62 mg/dL (5-11.8)  H  05/19/20  Unknown














- Diagnostic Impressions


Diagnostic Impressions: 








Echocardiogram  05/16/20 23:50


Transthoracic Echocardiogram


 


Indication:


CHF


BP:           84/59


HR:           117


 


Conclusions


 *4-chamber dilated cardiomyopathy.


 *The right heart chambers are both severely dilated. There is


moderately severe tricuspid regurgitation, and severe pulmonary HTN,


with PASP of 82mmHg.


 *Left heart chambers are moderately dilated.


 *Global left ventricular systolic function is severely decreased.


 *The estimated ejection fraction is 20-25%. 


 *There is mild mitral regurgitation. 


 


Findings     


Left Ventricle:


The left ventricular chamber size is moderately dilated. There is no


left ventricular hypertrophy. Severe global hypokinesis of the left


ventricle is observed. Global left ventricular systolic function is


severely decreased. The estimated ejection fraction is 20-25%.  Abnormal


left ventricular diastolic function is observed. 


 


Left Atrium:


The left atrium is moderate to severely dilated.  


 


Right Ventricle:


The right ventricle is severely dilated.  The right ventricular global


systolic function is severely reduced. 


 


Right Atrium:


The right atrial cavity size is severely dilated. 


 


Aortic Valve:


The aortic valve leaflets are moderately thickened. There is trace of


aortic regurgitation. There is no evidence of aortic stenosis. 


 


Mitral Valve:


The mitral valve leaflets are moderately thickened. There is mild mitral


regurgitation.  There is no evidence of mitral stenosis. 


 


Tricuspid Valve:


The tricuspid valve leaflets are normal.  There is moderate to severe


tricuspid regurgitation. The right ventricular systolic pressure is


calculated at 82 mmHg.  There is evidence of severe pulmonary


hypertension. There is no tricuspid stenosis. 


 


Pulmonic Valve:


The pulmonic valve appears normal. There is mild pulmonic regurgitation.


 


 


Pericardium:


A trivial pericardial effusion is visualized. 


 


Aorta:


There is no dilatation of the ascending aorta. There is no dilatation of


the aortic root. 


 


Venous:


The inferior vena cava is dilated.  There is less than 50% respiratory


change in the inferior vena cava dimension. 


 


Measurements     


Chambers 2D


Name                    Value         Normal Range            


IVSd (2D)               1.56 cm       (0.6 - 1.1)             


LVPWd (2D)              1.56 cm       (0.6 - 1.1)             


LVIDd (2D)              5.05 cm       (3.7 - 5.6)             


LVIDs (2D)              4.47 cm       (2 - 3.8)               


LV FS (2D)              11.64 %       -                        


EF Teichholz (2D)       25.1 %        -                        


Ao root diameter (2D)   3.06 cm       (2 - 3.7)               


 


Volumes/Mass


Name                    Value         Normal Range            


LA ESV SP 4CH (A/L)     76.11 ml      -                        


LA ESV SP 2CH (A/L)     48.94 ml      -                        


LA ESV BP (A/L)         62.38 ml      -                        


LA ESV SP 4CH (MOD)     70.31 ml      -                        


LA ESV SP 2CH (MOD)     46.04 ml      -                        


LA ESV BP (MOD)         58.03 ml      -                        


LA ESV BP (MOD) index   28.87 ml/m2   -                        


LV EDV SP 4CH (MOD)     81.51 ml      -                        


LV ESV SP 4CH (MOD)     46.31 ml      -                        


EF SP 4CH (MOD)         43.18 %       -                        


LV EDV SP 2CH (MOD)     84.04 ml      -                        


LV ESV SP 2CH (MOD)     60.72 ml      -                        


EF SP 2CH (MOD)         27.76 %       -                        


LV EDV BP               84.52 ml      -                        


LV ESV BP               53.64 ml      -                        


BP EF (MOD)             36.54 %       -                        


 


Aortic Valve


Name                    Value         Normal Range            


LVOT diameter           2.01 cm       -                        


LVOT Vmax               0.64 m/sec    -                        


LVOT VTI                8.01 cm       -                        


LVOT peak gradient      1.62 mmHg     -                        


LVOT mean gradient      0.79 mmHg     -                        


SV LVOT                 25.42 ml      -                        


Ascending Ao            2.81 cm       -                        


 


Tricuspid Valve


Name                    Value         Normal Range            


TR Vmax                 4.09 m/sec    -                        


TR peak gradient        66.78 mmHg    -                        


RAP                     15 mmHg       -                        


RVSP                    82 mmHg       -                        


 


Pulmonic Valve/Qp:Qs


Name                    Value         Normal Range            


PV Vmax                 0.87 m/sec    -                        


PV peak gradient        3.06 mmHg     -                        


NE end-diastolic Vmax   2.66 m/sec    -                        


RVOT Vmax               0.7 m/sec     -                        


RVOT peak gradient      1.96 mmHg     -                        


PV acceleration time    53.28 msec    -                        


 


Electronically signed by: Shade Vela MD on 05/17/2020 15:34:04











Min/IV: 


                                        





Voiding Method                   Incontinent


IV Catheter Type [Right          Triple Lumen Cath


Femoral]                         


IV Catheter Type [Right Foot]    INT / Saline Lock


IV Catheter Type [Right          INT / Saline Lock


Forearm]                         


IV Catheter Type [Left Wrist]    INT / Saline Lock


IV Catheter Type [Left Forearm   INT / Saline Lock


]                                











Active Medications





- Current Medications


Current Medications: 














Generic Name Dose Route Start Last Admin





  Trade Name Freq  PRN Reason Stop Dose Admin


 


Acetaminophen  650 mg  05/16/20 23:45 





  Tylenol  PO  





  Q4H PRN  





  Pain MILD(1-3)/Fever >100.5/HA  


 


Albuterol  2.5 mg  05/17/20 14:57 





  Proventil  IH  





  Q4HRT PRN  





  Shortness Of Breath  


 


Albuterol/Ipratropium  1 ampul  05/17/20 20:00  05/22/20 08:28





  Duoneb *Not For Prn Use*  IH   1 ampul





  TIDRT AMANDA   Administration


 


Lipase/Protease/Amylase  1 each  05/20/20 15:33 





  Pancrewillie Cloud 10,500 Unit  FEEDTUBE  





  PRN PRN  





  For Clogged Feeding Tube  


 


Arformoterol Tartrate  15 mcg  05/17/20 20:00  05/22/20 08:28





  Brovana Nebu  IH   15 mcg





  Q12HRT AMANDA   Administration


 


Aspirin  325 mg  05/17/20 10:00  05/22/20 09:34





  Ecotrin  PO   325 mg





  QDAY AMANDA   Administration


 


Atorvastatin Calcium  40 mg  05/17/20 22:00  05/21/20 21:53





  Lipitor  PO   40 mg





  QHS AMANDA   Administration


 


Budesonide  0.5 mg  05/17/20 20:00  05/22/20 08:28





  Pulmicort  IH   0.5 mg





  Q12HRT AMANDA   Administration


 


Dextrose  50 ml  05/20/20 17:44 





  D50w (25gm) Syringe  IV  





  Q30MIN PRN  





  Hypoglycemia  





  Protocol  


 


Diltiazem HCl  30 mg  05/17/20 18:00  05/22/20 01:05





  Cardizem  PO   30 mg





  Q6HR AMANDA   Administration


 


Famotidine  20 mg  05/21/20 10:00  05/22/20 09:34





  Pepcid  IV   20 mg





  QDAY AMANDA   Administration


 


Dextrose/Sodium Chloride  1,000 mls @ 75 mls/hr  05/20/20 18:00  05/22/20 01:05





  D5ns  IV   75 mls/hr





  AS DIRECT AMANDA   Administration


 


Norepinephrine  4 mg in 250 mls @ 7.5 mls/hr  05/20/20 18:00  05/21/20 16:00





  Levophed Drip 4 Mg/Ns 250 Ml  IV   0 mcg/min





  TITR AMANDA   0 mls/hr





    Titration





  Protocol  





  2 MCG/MIN  


 


Vasopressin 20 unit/ Sodium  101 mls @ 9.09 mls/hr  05/20/20 18:00  05/20/20 

18:30





  Chloride  IV   0 units/min





  TITR AMANDA   0 mls/hr





    Titration





  Protocol  





  0.03 UNITS/MIN  


 


Magnesium Hydroxide  30 ml  05/16/20 23:45 





  Milk Of Magnesia  PO  





  Q4H PRN  





  Constipation  


 


Methylprednisolone Sodium Succinate  40 mg  05/21/20 10:00  05/22/20 09:35





  Solu-Medrol  IV   40 mg





  Q12HR AMANDA   Administration


 


Morphine Sulfate  2 mg  05/16/20 23:45 





  Morphine  IV  





  Q5MIN PRN  





  Chest Pain unrelieved by NTG  


 


Nitroglycerin  0.4 mg  05/16/20 23:45 





  Nitrostat  SL  





  .Q5MIN PRN  





  Chest Pain  


 


Ondansetron HCl  4 mg  05/16/20 23:45 





  Zofran  IV  





  Q8H PRN  





  Nausea And Vomiting  


 


Simple Syrup  15 ml  05/20/20 15:33 





  Simple Syrup  FEEDTUBE  





  PRN PRN  





  Hypoglycemia  


 


Simple Syrup  30 ml  05/20/20 15:33 





  Simple Syrup  FEEDTUBE  





  PRN PRN  





  Hypoglycemia  


 


Sodium Bicarbonate  325 mg  05/20/20 15:33 





  Sodium Bicarbonate  FEEDTUBE  





  PRN PRN  





  For Clogged Feeding Tube  


 


Sodium Chloride  10 ml  05/17/20 10:00  05/22/20 09:35





  Sodium Chloride Flush Syringe 10 Ml  IV   10 ml





  BID AMANDA   Administration


 


Sodium Chloride  10 ml  05/16/20 23:45 





  Sodium Chloride Flush Syringe 10 Ml  IV  





  PRN PRN  





  LINE FLUSH  














Nutrition/Malnutrition Assess





- Dietary Evaluation


Nutrition/Malnutrition Findings: 


                                        





Nutrition Notes                                            Start:  05/20/20 

15:23


Freq:                                                      Status: Active       




Protocol:                                                                       




 Document     05/22/20 08:16  LP  (Rec: 05/22/20 08:21  LP  [id number])


 Nutrition Notes


     Initial or Follow up                        Reassessment


     Current Diagnosis                           Acute Kidney Injury,COPD,


                                                 Hypertension,Heart Failure


     Other Pertinent Diagnosis                   chest pain, volume overload


     Current Diet                                Nepro at 30ml/hr


     Labs/Tests                                  Na 132


     Pertinent Medications                       Solumedrol


                                                 D5 NS at 75ml/hr


     Height                                      5 ft


     Weight                                      89.7 kg


     Ideal Body Weight (kg)                      45.45


     BMI                                         38.6


     Weight Status                               Obese


     Subjective/Other Information                Consult for TF but TF was


                                                 already ordered.


     Burn                                        Absent


     Trauma                                      Absent


     Minimum of two criteria                     No


     Fluid Accumulation                          Moderate to Severe (severe)


     #1


      Nutrition Diagnosis                        Inadequate oral intake


      Etiology                                   SOB


      As Evidenced by Signs and Symptoms         Pt unable to consume estimated


                                                 energy needs via PO


      Diagnosis Progress(for reassessment        Continues


       documentation)                            


     Is patient on ventilator?                   No


     Is Patient Ambulatory and/or Out of Bed     No


     REE-([facility]-confined to bed)      1611.840


     Kcal/Kg value to use for calculation        14


     Approximate Energy Requirements Using       1256


      kcal/Kg                                    


     Calculation Used for Recommendations        Kcal/kg


     Additional Notes                            Protein: 54-82g (0.8-1.2g/kg


                                                 using AdjBW 68kg)


                                                 Fluid 1ml/kcal or per MD


 Nutrition Intervention


     Change Diet Order:                          TF


     Nutrition Support:                          Nepro 1.8 at 30ml/hr


                                                 Flush 100ml q4h


     Kcal                                        1,296


     Protein (gm)                                58


     Fluid (mL)                                  523


     Goal #1                                     Meet at least 80% of kcal and


                                                 protein needs


     Anticipated Discharge Needs:                Unable to determine at this


                                                 time


     Follow-Up By:                               05/25/20


     Additional Comments                         Follow for TF start/tolerance

## 2020-05-22 NOTE — PROGRESS NOTE
Assessment and Plan








Acute hypoxemic and hypercapnic respiratory failure


COPD


Severe pulmonary hypertension


Hyponatremia


Acute toxic-metabolic encephalaopthy


Tobacco use disorder/Nicotine dependence


Non-ST elevation MI 


Acute on chronic congestive heart failure-systolic, EF 20-25%


Acute kidney injury secondary to vasomotor nephropathy and diuresis


Bilateral lower extremity edema


Anemia of chronic disease 


Morbid obesity





- give 1-2 hour break off BIPAP as tolerated


- repeat CXR & ABG in am


- consider provigil


- follow HIT assay report








- keep on continuous BIPAP X 24 more hours then reassess (no skin breakdown and 

pCO2 improving)


- continue enteral nutrition as tolerated


- continue aspiration precautions


- continue supplemental oxygen as needed to keep O2 sat's > 92%


- continue bronchodilators (SAIDA & LABA) with pulmonary hygiene per RT


- continue systemic steroids with taper


- continue inhaled corticosteroids


- empiric CAP AB's therapy with Levaquin


- PT/OT as tolerated


- mobility protocols for pressure ulcer prophylaxis


- accuchecks with glycemic control per SSI for target BG < 140-180 mg/dl


- soft restraints as pulling at NGT 


- NSTEMI per cardiology team


- Avoid nephrotoxins, adjust all medications for GFR and CrCL 


- heparin stopped re: thrombocytopenia


- HIT assay ordered


- ACS work-up & optimizatio of CHF management per cardiology


- wean levophed for target MAP >/= 65 mmHg


- mobility protocols for pressure ulcer prophylaxis


- avoid benzodiazepines acute re: delirium issues


- prn analgesia per CPOT score


- continue GI & VTE prophylaxis


- Flu & pneumovax addressed per protocol


- continue other care per attending / other consultants





.. re-evaluate in am & prn





CONDITION- CRITICAL


PROGNOSIS- GUARDED


CODE STATUS- FULL CODE





Life threatening condition: Severe pulmonary HTN, acute and chronic  hypoxic 

respiratory failure on high flow oxygen, acute on chronic systolic heart 

failure, hyponatremia, symptomatic hypoglycemia


Morbidity/Mortality: High


complexity of medical decision making: High





The high probability of a clinically significant, sudden or life-threatening 

deterioration of the [respiratory, cardiovascular,neurologic, renal] system(s) 

required my full and direct attention, intervention and personal management. The

aggregate critical care time was [32] minutes without overlap. Time includes 

spent on;





[x] Data Review and interpretation 





[x] Patient assessment and monitoring of vital signs 





[x] Documentation 





[x] Medication orders and management











Subjective


Date of service: 05/22/20


Principal diagnosis: Ac hypoxemic and hypercapnic resp failure; AE-COPD; NSTEMI;

 MILAN; HFrEF


Interval history: 





Patient is seen today for: Acute hypoxemic and hypercapnic respiratory failure; 

AE-COPD; Severe Pulm HTN; Acute toxic-metabolic encephalaopthy; NSTEMI; MILAN; 

HFrEF





Seen and examined at bedside; 24hour events reviewed; nursing and respiratory 

care staff consulted; no adverse overnight events reported to me; resting 

peacefully in bed; remains somnolent; remains hypoxemic; pH better compensated 

but still suboptimal; No emesis or opvert aspiration





Objective


                               Vital Signs - 12hr











  05/22/20 05/22/20 05/22/20





  06:15 06:30 06:45


 


Temperature   


 


Pulse Rate 67 90 70


 


Pulse Rate [   





Bilateral   





Throughout]   


 


Pulse Rate [   





From Monitor]   


 


Respiratory 28 H 28 H 24





Rate   


 


Respiratory   





Rate [Bilateral   





Throughout]   


 


Blood Pressure 97/63 104/72 106/66


 


O2 Sat by Pulse 96 96 95





Oximetry   














  05/22/20 05/22/20 05/22/20





  07:00 07:15 07:30


 


Temperature   


 


Pulse Rate 71 80 73


 


Pulse Rate [   





Bilateral   





Throughout]   


 


Pulse Rate [   





From Monitor]   


 


Respiratory 28 H 28 H 20





Rate   


 


Respiratory   





Rate [Bilateral   





Throughout]   


 


Blood Pressure 106/66 112/70 112/70


 


O2 Sat by Pulse 96 97 98





Oximetry   














  05/22/20 05/22/20 05/22/20





  07:45 08:00 09:24


 


Temperature  98.8 F 


 


Pulse Rate 79 77 


 


Pulse Rate [  92 H 





Bilateral   





Throughout]   


 


Pulse Rate [  77 





From Monitor]   


 


Respiratory 28 H 20 





Rate   


 


Respiratory  29 H 





Rate [Bilateral   





Throughout]   


 


Blood Pressure 113/73 97/63 


 


O2 Sat by Pulse 97 96 96





Oximetry   














  05/22/20 05/22/20 05/22/20





  12:00 14:00 16:00


 


Temperature 97.7 F  98.3 F


 


Pulse Rate   


 


Pulse Rate [  86 





Bilateral   





Throughout]   


 


Pulse Rate [ 78  78





From Monitor]   


 


Respiratory 20  20





Rate   


 


Respiratory  19 





Rate [Bilateral   





Throughout]   


 


Blood Pressure   


 


O2 Sat by Pulse   





Oximetry   











Constitutional: appears uncomfortable, other (obese  woman, grunting 

and moaning)


Eyes: non-icteric


ENT: oropharynx dry


Neck: supple, no lymphadenopathy


Effort: mildly labored


Ascultation: Bilateral: diminished breath sounds (poor AE bilaterally), rhonchi,

other (prolonged expiratory phase)


Percussion: Bilateral: not dull


Cardiovascular: regular rate and rhythm, other (irrreggular, S1,S2)


Gastrointestinal: normoactive bowel sounds, soft, non-tender, non-distended


Integumentary: rash, other (lower extemity edema +)


Extremities: no cyanosis, pink and warm, pulses normal, no ischemia or 

petechiae, edema


Neurologic: non-focal exam (moves all extemities), pupils equal and round, CN 

II-XII normal, other (lethargic)


Psychiatric: other (unable to assess secondary to mental status)


CBC and BMP: 


                                 05/25/20 04:23





                                 05/25/20 04:23


ABG, PT/INR, D-dimer: 


ABG











ABG pH  7.310 pH Units (7.350-7.450)  L  05/22/20  15:45    


 


ABG pCO2  55.3 mm Hg  05/22/20  15:45    


 


ABG pO2  73.7 mm Hg (80.0-90.0)  L  05/22/20  15:45    


 


ABG O2 Saturation  94.2 % (95.0-99.0)  L  05/22/20  15:45    





PT/INR, D-dimer











PT  15.3 Sec. (12.2-14.9)  H  05/17/20  03:25    


 


INR  1.20  (0.87-1.13)  H  05/17/20  03:25    








Abnormal lab findings: 


                                  Abnormal Labs











  05/16/20 05/16/20 05/17/20





  22:13 22:13 00:00


 


WBC   


 


Hct  43.2 H   45.8 H


 


MCV  107 H   106 H


 


MCH  34 H   33 H


 


RDW  20.2 H   19.7 H


 


Plt Count   


 


Seg Neutrophils %  77.3 H   78.8 H


 


Seg Neuts % (Manual)   


 


Lymphocytes % (Manual)   


 


Nucleated RBC %   


 


Seg Neutrophils # Man   


 


Lymphocytes # (Manual)   


 


PT   


 


INR   


 


Heparin Anti-Xa Level   


 


ABG pH   


 


ABG pO2   


 


ABG HCO3   


 


ABG O2 Saturation   


 


Oxyhemoglobin   


 


Sodium   130 L 


 


Potassium   


 


Chloride   85.0 L 


 


Carbon Dioxide   35 H 


 


BUN   


 


Creatinine   


 


Glucose   112 H 


 


POC Glucose   


 


Calcium   


 


Phosphorus   


 


Total Bilirubin   


 


AST   


 


ALT   5 L 


 


Troponin T   


 


NT-Pro-B Natriuret Pep   3719 H 


 


Total Protein   


 


Albumin   3.4 L 


 


LDL Cholesterol Direct   


 


Urine Creatinine   


 


Urine Total Protein   














  05/17/20 05/17/20 05/17/20





  00:00 00:12 03:25


 


WBC   


 


Hct   


 


MCV   


 


MCH   


 


RDW   


 


Plt Count   


 


Seg Neutrophils %   


 


Seg Neuts % (Manual)   


 


Lymphocytes % (Manual)   


 


Nucleated RBC %   


 


Seg Neutrophils # Man   


 


Lymphocytes # (Manual)   


 


PT   


 


INR   


 


Heparin Anti-Xa Level   


 


ABG pH   


 


ABG pO2   


 


ABG HCO3   


 


ABG O2 Saturation   


 


Oxyhemoglobin   


 


Sodium  131 L  


 


Potassium   


 


Chloride  85.3 L  


 


Carbon Dioxide  35 H  


 


BUN   


 


Creatinine   


 


Glucose  108 H  


 


POC Glucose   


 


Calcium   


 


Phosphorus   


 


Total Bilirubin   


 


AST   


 


ALT   


 


Troponin T   0.051 H D  0.113 H* D


 


NT-Pro-B Natriuret Pep   


 


Total Protein   


 


Albumin   


 


LDL Cholesterol Direct   33 L 


 


Urine Creatinine   


 


Urine Total Protein   














  05/17/20 05/17/20 05/17/20





  03:25 03:25 03:25


 


WBC   


 


Hct   


 


MCV  107 H  


 


MCH  34 H  


 


RDW  20.1 H  


 


Plt Count   


 


Seg Neutrophils %  77.0 H  


 


Seg Neuts % (Manual)   


 


Lymphocytes % (Manual)   


 


Nucleated RBC %   


 


Seg Neutrophils # Man   


 


Lymphocytes # (Manual)   


 


PT   15.3 H 


 


INR   1.20 H 


 


Heparin Anti-Xa Level   


 


ABG pH   


 


ABG pO2   


 


ABG HCO3   


 


ABG O2 Saturation   


 


Oxyhemoglobin   


 


Sodium    133 L


 


Potassium   


 


Chloride    86.1 L


 


Carbon Dioxide    36 H


 


BUN   


 


Creatinine   


 


Glucose    116 H


 


POC Glucose   


 


Calcium   


 


Phosphorus   


 


Total Bilirubin   


 


AST   


 


ALT   


 


Troponin T   


 


NT-Pro-B Natriuret Pep   


 


Total Protein   


 


Albumin   


 


LDL Cholesterol Direct   


 


Urine Creatinine   


 


Urine Total Protein   














  05/17/20 05/17/20 05/18/20





  05:29 13:51 04:45


 


WBC   


 


Hct   


 


MCV    107 H


 


MCH    34 H


 


RDW    20.1 H


 


Plt Count    136 L


 


Seg Neutrophils %   


 


Seg Neuts % (Manual)   


 


Lymphocytes % (Manual)   


 


Nucleated RBC %   


 


Seg Neutrophils # Man   


 


Lymphocytes # (Manual)   


 


PT   


 


INR   


 


Heparin Anti-Xa Level   0.14 L 


 


ABG pH   


 


ABG pO2   


 


ABG HCO3   


 


ABG O2 Saturation   


 


Oxyhemoglobin   


 


Sodium   


 


Potassium   


 


Chloride   


 


Carbon Dioxide   


 


BUN   


 


Creatinine   


 


Glucose   


 


POC Glucose   


 


Calcium   


 


Phosphorus   


 


Total Bilirubin   


 


AST   


 


ALT   


 


Troponin T  0.126 H*  


 


NT-Pro-B Natriuret Pep   


 


Total Protein   


 


Albumin   


 


LDL Cholesterol Direct   


 


Urine Creatinine   


 


Urine Total Protein   














  05/18/20 05/18/20 05/18/20





  04:45 16:23 16:23


 


WBC   


 


Hct   


 


MCV   


 


MCH   


 


RDW   


 


Plt Count   


 


Seg Neutrophils %   


 


Seg Neuts % (Manual)   


 


Lymphocytes % (Manual)   


 


Nucleated RBC %   


 


Seg Neutrophils # Man   


 


Lymphocytes # (Manual)   


 


PT   


 


INR   


 


Heparin Anti-Xa Level   0.27 L 


 


ABG pH   


 


ABG pO2   


 


ABG HCO3   


 


ABG O2 Saturation   


 


Oxyhemoglobin   


 


Sodium  127 L   123 L


 


Potassium  5.1 H D  


 


Chloride  83.9 L  


 


Carbon Dioxide  32 H  


 


BUN   


 


Creatinine  1.3 H D  


 


Glucose  116 H  


 


POC Glucose   


 


Calcium  8.3 L  


 


Phosphorus   


 


Total Bilirubin   


 


AST   


 


ALT   


 


Troponin T   


 


NT-Pro-B Natriuret Pep   


 


Total Protein   


 


Albumin   


 


LDL Cholesterol Direct   


 


Urine Creatinine   


 


Urine Total Protein   














  05/18/20 05/19/20 05/19/20





  18:42 09:32 09:32


 


WBC   11.4 H 


 


Hct   


 


MCV   106 H 


 


MCH   33 H 


 


RDW   19.5 H 


 


Plt Count   131 L 


 


Seg Neutrophils %   


 


Seg Neuts % (Manual)   


 


Lymphocytes % (Manual)   


 


Nucleated RBC %   


 


Seg Neutrophils # Man   


 


Lymphocytes # (Manual)   


 


PT   


 


INR   


 


Heparin Anti-Xa Level   


 


ABG pH   


 


ABG pO2   


 


ABG HCO3   


 


ABG O2 Saturation   


 


Oxyhemoglobin   


 


Sodium    128 L


 


Potassium   


 


Chloride    82.4 L


 


Carbon Dioxide    31 H


 


BUN    25 H


 


Creatinine    1.9 H


 


Glucose    61 L


 


POC Glucose  135 H  


 


Calcium    8.2 L


 


Phosphorus   


 


Total Bilirubin   


 


AST   


 


ALT   


 


Troponin T   


 


NT-Pro-B Natriuret Pep   


 


Total Protein   


 


Albumin   


 


LDL Cholesterol Direct   


 


Urine Creatinine   


 


Urine Total Protein   














  05/19/20 05/19/20 05/19/20





  13:37 19:24 19:33


 


WBC   


 


Hct   


 


MCV   


 


MCH   


 


RDW   


 


Plt Count   


 


Seg Neutrophils %   


 


Seg Neuts % (Manual)   


 


Lymphocytes % (Manual)   


 


Nucleated RBC %   


 


Seg Neutrophils # Man   


 


Lymphocytes # (Manual)   


 


PT   


 


INR   


 


Heparin Anti-Xa Level  < 0.10 L  


 


ABG pH   


 


ABG pO2   


 


ABG HCO3   


 


ABG O2 Saturation   


 


Oxyhemoglobin   


 


Sodium   


 


Potassium   


 


Chloride   


 


Carbon Dioxide   


 


BUN   


 


Creatinine   


 


Glucose   


 


POC Glucose   < 40 L  > 500 H


 


Calcium   


 


Phosphorus   


 


Total Bilirubin   


 


AST   


 


ALT   


 


Troponin T   


 


NT-Pro-B Natriuret Pep   


 


Total Protein   


 


Albumin   


 


LDL Cholesterol Direct   


 


Urine Creatinine   


 


Urine Total Protein   














  05/19/20 05/19/20 05/19/20





  20:01 20:10 21:03


 


WBC   


 


Hct   


 


MCV   


 


MCH   


 


RDW   


 


Plt Count   


 


Seg Neutrophils %   


 


Seg Neuts % (Manual)   


 


Lymphocytes % (Manual)   


 


Nucleated RBC %   


 


Seg Neutrophils # Man   


 


Lymphocytes # (Manual)   


 


PT   


 


INR   


 


Heparin Anti-Xa Level  < 0.10 L  


 


ABG pH   


 


ABG pO2   


 


ABG HCO3   


 


ABG O2 Saturation   


 


Oxyhemoglobin   


 


Sodium   124 L 


 


Potassium   5.5 H D 


 


Chloride   82.4 L 


 


Carbon Dioxide   


 


BUN   31 H 


 


Creatinine   2.1 H 


 


Glucose   212 H 


 


POC Glucose    202 H


 


Calcium   8.0 L 


 


Phosphorus   


 


Total Bilirubin   


 


AST   


 


ALT   


 


Troponin T   


 


NT-Pro-B Natriuret Pep   


 


Total Protein   


 


Albumin   


 


LDL Cholesterol Direct   


 


Urine Creatinine   


 


Urine Total Protein   














  05/19/20 05/19/20 05/19/20





  21:53 23:18 Unknown


 


WBC   


 


Hct   


 


MCV   


 


MCH   


 


RDW   


 


Plt Count   


 


Seg Neutrophils %   


 


Seg Neuts % (Manual)   


 


Lymphocytes % (Manual)   


 


Nucleated RBC %   


 


Seg Neutrophils # Man   


 


Lymphocytes # (Manual)   


 


PT   


 


INR   


 


Heparin Anti-Xa Level   


 


ABG pH   


 


ABG pO2   


 


ABG HCO3   


 


ABG O2 Saturation   


 


Oxyhemoglobin   


 


Sodium   


 


Potassium   


 


Chloride   


 


Carbon Dioxide   


 


BUN   


 


Creatinine   


 


Glucose   


 


POC Glucose  177 H  203 H 


 


Calcium   


 


Phosphorus   


 


Total Bilirubin   


 


AST   


 


ALT   


 


Troponin T   


 


NT-Pro-B Natriuret Pep   


 


Total Protein   


 


Albumin   


 


LDL Cholesterol Direct   


 


Urine Creatinine    216.1 H


 


Urine Total Protein    62 H














  05/20/20 05/20/20 05/20/20





  00:15 02:20 04:34


 


WBC   


 


Hct   


 


MCV   


 


MCH   


 


RDW   


 


Plt Count   


 


Seg Neutrophils %   


 


Seg Neuts % (Manual)   


 


Lymphocytes % (Manual)   


 


Nucleated RBC %   


 


Seg Neutrophils # Man   


 


Lymphocytes # (Manual)   


 


PT   


 


INR   


 


Heparin Anti-Xa Level   


 


ABG pH   


 


ABG pO2   


 


ABG HCO3   


 


ABG O2 Saturation   


 


Oxyhemoglobin   


 


Sodium    129 L


 


Potassium   


 


Chloride    83.7 L


 


Carbon Dioxide   


 


BUN    36 H


 


Creatinine    2.8 H


 


Glucose    129 H


 


POC Glucose  156 H  149 H 


 


Calcium    7.5 L


 


Phosphorus   


 


Total Bilirubin    2.30 H


 


AST    3636 H


 


ALT    1241 H


 


Troponin T   


 


NT-Pro-B Natriuret Pep   


 


Total Protein   


 


Albumin    3.2 L


 


LDL Cholesterol Direct   


 


Urine Creatinine   


 


Urine Total Protein   














  05/20/20 05/20/20 05/20/20





  04:34 05:19 12:13


 


WBC   


 


Hct   


 


MCV   


 


MCH   


 


RDW   


 


Plt Count   


 


Seg Neutrophils %   


 


Seg Neuts % (Manual)   


 


Lymphocytes % (Manual)   


 


Nucleated RBC %   


 


Seg Neutrophils # Man   


 


Lymphocytes # (Manual)   


 


PT   


 


INR   


 


Heparin Anti-Xa Level  0.10 L  


 


ABG pH   


 


ABG pO2   


 


ABG HCO3   


 


ABG O2 Saturation   


 


Oxyhemoglobin   


 


Sodium   


 


Potassium   


 


Chloride   


 


Carbon Dioxide   


 


BUN   


 


Creatinine   


 


Glucose   


 


POC Glucose   115 H  69 L


 


Calcium   


 


Phosphorus   


 


Total Bilirubin   


 


AST   


 


ALT   


 


Troponin T   


 


NT-Pro-B Natriuret Pep   


 


Total Protein   


 


Albumin   


 


LDL Cholesterol Direct   


 


Urine Creatinine   


 


Urine Total Protein   














  05/20/20 05/20/20 05/20/20





  15:15 17:37 18:44


 


WBC   


 


Hct   


 


MCV   


 


MCH   


 


RDW   


 


Plt Count   


 


Seg Neutrophils %   


 


Seg Neuts % (Manual)   


 


Lymphocytes % (Manual)   


 


Nucleated RBC %   


 


Seg Neutrophils # Man   


 


Lymphocytes # (Manual)   


 


PT   


 


INR   


 


Heparin Anti-Xa Level   


 


ABG pH  7.180 L*  


 


ABG pO2  70.1 L  


 


ABG HCO3  31.5 H  


 


ABG O2 Saturation  89.6 L  


 


Oxyhemoglobin  87.7 L  


 


Sodium   


 


Potassium   


 


Chloride   


 


Carbon Dioxide   


 


BUN   


 


Creatinine   


 


Glucose   


 


POC Glucose   68 L  160 H


 


Calcium   


 


Phosphorus   


 


Total Bilirubin   


 


AST   


 


ALT   


 


Troponin T   


 


NT-Pro-B Natriuret Pep   


 


Total Protein   


 


Albumin   


 


LDL Cholesterol Direct   


 


Urine Creatinine   


 


Urine Total Protein   














  05/20/20 05/20/20 05/20/20





  20:16 21:00 Unknown


 


WBC   


 


Hct   


 


MCV   


 


MCH   


 


RDW   


 


Plt Count   


 


Seg Neutrophils %   


 


Seg Neuts % (Manual)   


 


Lymphocytes % (Manual)   


 


Nucleated RBC %   


 


Seg Neutrophils # Man   


 


Lymphocytes # (Manual)   


 


PT   


 


INR   


 


Heparin Anti-Xa Level   0.20 L  0.12 L


 


ABG pH  7.184 L*  


 


ABG pO2  77.9 L  


 


ABG HCO3  30.5 H  


 


ABG O2 Saturation  92.8 L  


 


Oxyhemoglobin  90.7 L  


 


Sodium   


 


Potassium   


 


Chloride   


 


Carbon Dioxide   


 


BUN   


 


Creatinine   


 


Glucose   


 


POC Glucose   


 


Calcium   


 


Phosphorus   


 


Total Bilirubin   


 


AST   


 


ALT   


 


Troponin T   


 


NT-Pro-B Natriuret Pep   


 


Total Protein   


 


Albumin   


 


LDL Cholesterol Direct   


 


Urine Creatinine   


 


Urine Total Protein   














  05/21/20 05/21/20 05/21/20





  00:06 00:08 05:35


 


WBC    15.0 H


 


Hct   


 


MCV    107 H


 


MCH    34 H


 


RDW    19.5 H


 


Plt Count    56 L


 


Seg Neutrophils %   


 


Seg Neuts % (Manual)    92.0 H


 


Lymphocytes % (Manual)    5.0 L


 


Nucleated RBC %    4.0 H


 


Seg Neutrophils # Man    0.0 L


 


Lymphocytes # (Manual)    0.0 L


 


PT   


 


INR   


 


Heparin Anti-Xa Level   


 


ABG pH   7.230 L 


 


ABG pO2   79.8 L 


 


ABG HCO3   29.2 H 


 


ABG O2 Saturation   94.3 L 


 


Oxyhemoglobin   92.3 L 


 


Sodium   


 


Potassium   


 


Chloride   


 


Carbon Dioxide   


 


BUN   


 


Creatinine   


 


Glucose   


 


POC Glucose  185 H  


 


Calcium   


 


Phosphorus   


 


Total Bilirubin   


 


AST   


 


ALT   


 


Troponin T   


 


NT-Pro-B Natriuret Pep   


 


Total Protein   


 


Albumin   


 


LDL Cholesterol Direct   


 


Urine Creatinine   


 


Urine Total Protein   














  05/21/20 05/21/20 05/21/20





  05:35 06:07 12:33


 


WBC   


 


Hct   


 


MCV   


 


MCH   


 


RDW   


 


Plt Count   


 


Seg Neutrophils %   


 


Seg Neuts % (Manual)   


 


Lymphocytes % (Manual)   


 


Nucleated RBC %   


 


Seg Neutrophils # Man   


 


Lymphocytes # (Manual)   


 


PT   


 


INR   


 


Heparin Anti-Xa Level   


 


ABG pH   


 


ABG pO2   


 


ABG HCO3   


 


ABG O2 Saturation   


 


Oxyhemoglobin   


 


Sodium  130 L  


 


Potassium   


 


Chloride  85.2 L  


 


Carbon Dioxide   


 


BUN  49 H  


 


Creatinine  3.4 H  


 


Glucose  157 H  


 


POC Glucose   158 H  170 H


 


Calcium  6.7 L  


 


Phosphorus  7.00 H  


 


Total Bilirubin  2.10 H  


 


AST  2625 H  


 


ALT  1547 H  


 


Troponin T   


 


NT-Pro-B Natriuret Pep   


 


Total Protein  6.2 L  


 


Albumin  3.3 L  


 


LDL Cholesterol Direct   


 


Urine Creatinine   


 


Urine Total Protein   














  05/21/20 05/22/20 05/22/20





  18:26 00:26 04:30


 


WBC    12.3 H


 


Hct    43.6 H


 


MCV    104 H


 


MCH    33 H


 


RDW    20.4 H


 


Plt Count    44 L


 


Seg Neutrophils %   


 


Seg Neuts % (Manual)    89.0 H


 


Lymphocytes % (Manual)    7.0 L


 


Nucleated RBC %   


 


Seg Neutrophils # Man    10.9 H


 


Lymphocytes # (Manual)    0.9 L


 


PT   


 


INR   


 


Heparin Anti-Xa Level   


 


ABG pH   


 


ABG pO2   


 


ABG HCO3   


 


ABG O2 Saturation   


 


Oxyhemoglobin   


 


Sodium   


 


Potassium   


 


Chloride   


 


Carbon Dioxide   


 


BUN   


 


Creatinine   


 


Glucose   


 


POC Glucose  172 H  171 H 


 


Calcium   


 


Phosphorus   


 


Total Bilirubin   


 


AST   


 


ALT   


 


Troponin T   


 


NT-Pro-B Natriuret Pep   


 


Total Protein   


 


Albumin   


 


LDL Cholesterol Direct   


 


Urine Creatinine   


 


Urine Total Protein   














  05/22/20 05/22/20 05/22/20





  04:30 06:16 15:45


 


WBC   


 


Hct   


 


MCV   


 


MCH   


 


RDW   


 


Plt Count   


 


Seg Neutrophils %   


 


Seg Neuts % (Manual)   


 


Lymphocytes % (Manual)   


 


Nucleated RBC %   


 


Seg Neutrophils # Man   


 


Lymphocytes # (Manual)   


 


PT   


 


INR   


 


Heparin Anti-Xa Level   


 


ABG pH    7.310 L


 


ABG pO2    73.7 L


 


ABG HCO3    27.2 H


 


ABG O2 Saturation    94.2 L


 


Oxyhemoglobin    92.1 L


 


Sodium  132 L  


 


Potassium   


 


Chloride  88.4 L  


 


Carbon Dioxide   


 


BUN  61 H  


 


Creatinine  2.9 H  


 


Glucose  168 H  


 


POC Glucose   179 H 


 


Calcium  6.9 L  


 


Phosphorus   


 


Total Bilirubin  1.80 H  


 


AST  967 H  


 


ALT  1094 H  


 


Troponin T   


 


NT-Pro-B Natriuret Pep   


 


Total Protein  6.1 L  


 


Albumin  2.9 L  


 


LDL Cholesterol Direct   


 


Urine Creatinine   


 


Urine Total Protein   











Chest x-ray: image reviewed


Allied health notes reviewed: nursing

## 2020-05-23 LAB
ALBUMIN SERPL-MCNC: 2.9 G/DL (ref 3.9–5)
ALT SERPL-CCNC: 723 UNITS/L (ref 7–56)
BASOPHILS # (AUTO): 0 K/MM3 (ref 0–0.1)
BASOPHILS NFR BLD AUTO: 0.1 % (ref 0–1.8)
BUN SERPL-MCNC: 65 MG/DL (ref 7–17)
BUN/CREAT SERPL: 28 %
CALCIUM SERPL-MCNC: 7.5 MG/DL (ref 8.4–10.2)
EOSINOPHIL # BLD AUTO: 0 K/MM3 (ref 0–0.4)
EOSINOPHIL NFR BLD AUTO: 0 % (ref 0–4.3)
HCO3 BLDA-SCNC: 27.6 MMOL/L (ref 20–26)
HCT VFR BLD CALC: 43.9 % (ref 30.3–42.9)
HEMOLYSIS INDEX: 32
HGB BLD-MCNC: 14.3 GM/DL (ref 10.1–14.3)
LYMPHOCYTES # BLD AUTO: 0.5 K/MM3 (ref 1.2–5.4)
LYMPHOCYTES NFR BLD AUTO: 5.2 % (ref 13.4–35)
MCHC RBC AUTO-ENTMCNC: 33 % (ref 30–34)
MCV RBC AUTO: 105 FL (ref 79–97)
MONOCYTES # (AUTO): 0.5 K/MM3 (ref 0–0.8)
MONOCYTES % (AUTO): 5 % (ref 0–7.3)
PCO2 BLDA: 55.1 MM HG
PH BLDA: 7.32 PH UNITS (ref 7.35–7.45)
PLATELET # BLD: 30 K/MM3 (ref 140–440)
PO2 BLDA: 73.4 MM HG (ref 80–90)
RBC # BLD AUTO: 4.16 M/MM3 (ref 3.65–5.03)

## 2020-05-23 PROCEDURE — 5A09357 ASSISTANCE WITH RESPIRATORY VENTILATION, LESS THAN 24 CONSECUTIVE HOURS, CONTINUOUS POSITIVE AIRWAY PRESSURE: ICD-10-PCS | Performed by: INTERNAL MEDICINE

## 2020-05-23 RX ADMIN — DEXTROSE AND SODIUM CHLORIDE SCH MLS/HR: 5; .9 INJECTION, SOLUTION INTRAVENOUS at 05:29

## 2020-05-23 RX ADMIN — FAMOTIDINE SCH MG: 10 INJECTION, SOLUTION INTRAVENOUS at 10:01

## 2020-05-23 RX ADMIN — IPRATROPIUM BROMIDE AND ALBUTEROL SULFATE SCH AMPUL: .5; 3 SOLUTION RESPIRATORY (INHALATION) at 07:56

## 2020-05-23 RX ADMIN — Medication SCH ML: at 21:37

## 2020-05-23 RX ADMIN — BUDESONIDE SCH MG: 0.5 INHALANT RESPIRATORY (INHALATION) at 07:56

## 2020-05-23 RX ADMIN — ARFORMOTEROL TARTRATE SCH MCG: 15 SOLUTION RESPIRATORY (INHALATION) at 22:46

## 2020-05-23 RX ADMIN — IPRATROPIUM BROMIDE AND ALBUTEROL SULFATE SCH AMPUL: .5; 3 SOLUTION RESPIRATORY (INHALATION) at 22:46

## 2020-05-23 RX ADMIN — METHYLPREDNISOLONE SODIUM SUCCINATE SCH MG: 40 INJECTION, POWDER, FOR SOLUTION INTRAMUSCULAR; INTRAVENOUS at 10:01

## 2020-05-23 RX ADMIN — ARFORMOTEROL TARTRATE SCH MCG: 15 SOLUTION RESPIRATORY (INHALATION) at 07:56

## 2020-05-23 RX ADMIN — Medication SCH ML: at 10:02

## 2020-05-23 RX ADMIN — ASPIRIN SCH MG: 325 TABLET, COATED ORAL at 10:01

## 2020-05-23 RX ADMIN — IPRATROPIUM BROMIDE AND ALBUTEROL SULFATE SCH AMPUL: .5; 3 SOLUTION RESPIRATORY (INHALATION) at 14:17

## 2020-05-23 RX ADMIN — BUDESONIDE SCH MG: 0.5 INHALANT RESPIRATORY (INHALATION) at 22:46

## 2020-05-23 NOTE — PROGRESS NOTE
Assessment and Plan





1.  Atrial flutter with variable AV block


2.  Chronic combined systolic and diastolic heart failure


3.  Dilated cardiomyopathy left ventricular ejection fraction 20 to 25%


4.  Acute exacerbation of COPD


5.  Chronic cor pulmonale with severe pulmonary hypertension


6.  Chronic kidney disease stage IV


7.  Hyponatremia





Plan.


Continue management as per pulmonary plan.  Cardiac wise patient is stable we 

will monitor fluid input and output closely.


Further cardiac work-up when more stable





Subjective


Date of service: 05/23/20


Principal diagnosis: Ac hypoxemic and hypercapnic resp failure; AE-COPD; NSTEMI;

MILAN; HFrEF


Interval history: 





No change in clinical status. No new cardiac symptoms.





Objective


                                   Vital Signs











  Temp Pulse Pulse Pulse Resp Resp BP


 


 05/23/20 08:00  97.5 F L      


 


 05/23/20 07:56   91 H  91 H   29 H  28 H  123/66


 


 05/23/20 07:16   93 H    19   114/78


 


 05/23/20 07:00   93 H    24   114/78


 


 05/23/20 06:46   73    24   124/70


 


 05/23/20 06:30   87    21   124/70


 


 05/23/20 06:16   92 H    24   109/65


 


 05/23/20 06:00   91 H    22   109/70


 


 05/23/20 05:46   90    21   118/76


 


 05/23/20 05:30   93 H    21   118/76


 


 05/23/20 05:29   93 H      109/70


 


 05/23/20 05:16   89    21   109/70


 


 05/23/20 05:00   90    28 H   137/90


 


 05/23/20 04:46   97 H    21   126/70


 


 05/23/20 04:30   94 H    27 H   137/90


 


 05/23/20 04:16   82    19   137/90


 


 05/23/20 04:00  97.2 F L  78   90  23   137/90


 


 05/23/20 03:46   74    18   118/73


 


 05/23/20 03:30   93 H    24   121/75


 


 05/23/20 03:16   92 H    25 H   121/75


 


 05/23/20 03:10   73    28 H   121/75


 


 05/23/20 03:00   80    27 H   121/75


 


 05/23/20 02:46   72    28 H   124/75


 


 05/23/20 02:30   65    24  


 


 05/23/20 02:15   71    21   124/75


 


 05/23/20 02:00   75    28 H   124/75


 


 05/23/20 01:46   68    17   114/89


 


 05/23/20 01:30   93 H    20   114/89


 


 05/23/20 01:16   61    28 H   116/67


 


 05/23/20 01:00   83    25 H   118/79


 


 05/23/20 00:46   67    27 H   118/79


 


 05/23/20 00:30   93 H    25 H   118/79


 


 05/23/20 00:16   91 H    29 H   131/84


 


 05/23/20 00:00  97.0 F L  89   90  25 H   131/84


 


 05/22/20 23:57   77    28 H   120/74


 


 05/22/20 23:46   90    27 H   131/87


 


 05/22/20 23:35   96 H      120/74


 


 05/22/20 23:30   92 H    25 H   132/76


 


 05/22/20 23:16   93 H    28 H   132/76


 


 05/22/20 23:00   94 H    28 H   132/76


 


 05/22/20 22:46   84    18   132/76


 


 05/22/20 22:30   84    27 H   132/76


 


 05/22/20 22:16   77    18   119/80


 


 05/22/20 22:00   85    28 H   134/80


 


 05/22/20 21:46   77    28 H   134/80


 


 05/22/20 21:30   86    28 H   119/80


 


 05/22/20 21:16   78    28 H   119/80


 


 05/22/20 21:00   77    28 H   132/100


 


 05/22/20 20:46   77    28 H   132/100


 


 05/22/20 20:30   77    28 H   117/77


 


 05/22/20 20:16   77    28 H   117/77


 


 05/22/20 20:00  97.6 F  80   77  28 H   114/81


 


 05/22/20 19:46   84    28 H   114/81


 


 05/22/20 19:38       


 


 05/22/20 19:37    88    31 H 


 


 05/22/20 19:36   85    31 H   114/81


 


 05/22/20 19:30   86    27 H   101/74


 


 05/22/20 19:16   92 H    28 H   101/74


 


 05/22/20 19:00   84    27 H   135/78


 


 05/22/20 18:46   76    28 H   135/78


 


 05/22/20 18:30   92 H    28 H   122/78


 


 05/22/20 18:16   82    25 H   122/78


 


 05/22/20 18:07   77    24   122/78


 


 05/22/20 16:00  98.3 F    78  20  


 


 05/22/20 14:00    86    19 


 


 05/22/20 12:00  97.7 F    78  20  














  Pulse Ox


 


 05/23/20 08:00 


 


 05/23/20 07:56  96


 


 05/23/20 07:16  95


 


 05/23/20 07:00  94


 


 05/23/20 06:46  96


 


 05/23/20 06:30  96


 


 05/23/20 06:16  95


 


 05/23/20 06:00  95


 


 05/23/20 05:46  96


 


 05/23/20 05:30  95


 


 05/23/20 05:29 


 


 05/23/20 05:16  95


 


 05/23/20 05:00  95


 


 05/23/20 04:46  96


 


 05/23/20 04:30  96


 


 05/23/20 04:16  95


 


 05/23/20 04:00  95


 


 05/23/20 03:46  96


 


 05/23/20 03:30  97


 


 05/23/20 03:16  97


 


 05/23/20 03:10  95


 


 05/23/20 03:00  96


 


 05/23/20 02:46  95


 


 05/23/20 02:30  96


 


 05/23/20 02:15  96


 


 05/23/20 02:00  95


 


 05/23/20 01:46  97


 


 05/23/20 01:30  95


 


 05/23/20 01:16  95


 


 05/23/20 01:00  95


 


 05/23/20 00:46  96


 


 05/23/20 00:30  97


 


 05/23/20 00:16  96


 


 05/23/20 00:00  97


 


 05/22/20 23:57  98


 


 05/22/20 23:46  98


 


 05/22/20 23:35 


 


 05/22/20 23:30  97


 


 05/22/20 23:16  96


 


 05/22/20 23:00  98


 


 05/22/20 22:46  97


 


 05/22/20 22:30  97


 


 05/22/20 22:16  98


 


 05/22/20 22:00  97


 


 05/22/20 21:46  99


 


 05/22/20 21:30  96


 


 05/22/20 21:16  97


 


 05/22/20 21:00  98


 


 05/22/20 20:46  97


 


 05/22/20 20:30  96


 


 05/22/20 20:16  99


 


 05/22/20 20:00  98


 


 05/22/20 19:46  98


 


 05/22/20 19:38  95


 


 05/22/20 19:37 


 


 05/22/20 19:36  95


 


 05/22/20 19:30  95


 


 05/22/20 19:16  95


 


 05/22/20 19:00  98


 


 05/22/20 18:46  95


 


 05/22/20 18:30  96


 


 05/22/20 18:16  89


 


 05/22/20 18:07  88


 


 05/22/20 16:00 


 


 05/22/20 14:00 


 


 05/22/20 12:00 














- Physical Examination


General: No Apparent Distress


HEENT: Positive: PERRL


Neck: Positive: neck supple


Cardiac: Positive: Regular Rate, S1/S2, S3, PMI, Dilated, Laterally Displaced


Lungs: Positive: Decreased Breath Sounds, Rhonchi.  Negative: Rales, Wheezes


Neuro: Positive: Grossly Intact


Abdomen: Positive: Soft


Skin: Positive: Clear


Extremities: Absent: edema





- Labs and Meds


                                 Cardiac Enzymes











  05/23/20 Range/Units





  05:15 


 


AST  322 H  (5-40)  units/L








                                       CBC











  05/23/20 Range/Units





  05:15 


 


WBC  10.5  (4.5-11.0)  K/mm3


 


RBC  4.16  (3.65-5.03)  M/mm3


 


Hgb  14.3  (10.1-14.3)  gm/dl


 


Hct  43.9 H  (30.3-42.9)  %


 


Plt Count  30 L  (140-440)  K/mm3


 


Lymph #  0.5 L  (1.2-5.4)  K/mm3


 


Mono #  0.5  (0.0-0.8)  K/mm3


 


Eos #  0.0  (0.0-0.4)  K/mm3


 


Baso #  0.0  (0.0-0.1)  K/mm3








                          Comprehensive Metabolic Panel











  05/23/20 Range/Units





  05:15 


 


Sodium  136 L  (137-145)  mmol/L


 


Potassium  3.8  (3.6-5.0)  mmol/L


 


Chloride  93.1 L  ()  mmol/L


 


Carbon Dioxide  28  (22-30)  mmol/L


 


BUN  65 H  (7-17)  mg/dL


 


Creatinine  2.3 H  (0.7-1.2)  mg/dL


 


Glucose  148 H  ()  mg/dL


 


Calcium  7.5 L  (8.4-10.2)  mg/dL


 


AST  322 H  (5-40)  units/L


 


ALT  723 H  (7-56)  units/L


 


Alkaline Phosphatase  101  ()  units/L


 


Total Protein  6.0 L  (6.3-8.2)  g/dL


 


Albumin  2.9 L  (3.9-5)  g/dL

## 2020-05-23 NOTE — XRAY REPORT
CHEST 1 VIEW, 5/23/2020 2:11 AM 



CLINICAL INFORMATION/INDICATION: History of pneumonia



COMPARISON: Chest radiograph, 5/20/2020 



FINDINGS:



SUPPORT DEVICES: There has been interval placement of esophagogastric tube with tip below the level o
f the diaphragm.

HEART: The cardiac silhouette is normal in size.

LUNGS/PLEURA: Pleuroparenchymal opacity within the right midlung has not significantly changed. The l
eft lung remains grossly clear.

ADDITIONAL FINDINGS: No additional acute findings.



IMPRESSION:

1. Stable appearance of pleuroparenchymal opacity within the right midlung.



Signer Name: Samra Holden MD 

Signed: 5/23/2020 2:25 AM

Workstation Name: [email or web address]

## 2020-05-23 NOTE — PROGRESS NOTE
Assessment and Plan





This is a 70 year old woman with CHF, COPD who presents with shortness of 

breath, chest pain now with MILAN, hyponatremia





# Acute Kidney Injury:  creatinine 0.7->1.3->1.9->2.8->3.4->2.9->2.3 as of this 

AM.  Suspect this is related to tubular injury in setting of hypotension, with 

changes due to fluid shifting with diuretic therapy and cardiorenal physiology. 

Off sotalol. Continue to be cautious with diuretic therapy and defer to pulm/c

ardiology to use prn based on respiratory status.  No indication for renal 

replacement therapy at this time, will follow closely given clinical status; is 

at high risk for requiring renal replacement therapy but stable with improved 

MAP.


- daily renal labs


- avoid nephrotoxins


- low salt diet


- strict Is/Os


- maintain MAP >70, continue pressors prn 


- supportive measures per ICU





# Hyponatremia: suspect hyponatremia related to volume overload, improved this 

AM to 136.  Ok to use furosemide for volume prn as noted above; furosemide 

should promote more water loss than sodium, but still need to monitor closely 

for worsening of hyponatremia





# Respiratory Acidosis: suspect related to underlying pulmonary disease; will 

worsen with excessive diuretic use, improved now





# NSTEMI/ CHF/ atrial flutter: appreciate cardiology input





# Shortness of Breath, Respiratory Distress, Pulmonary Hypertension, COPD: 

appreciate pulmonary input





# Anemia of chronic disease 





Thank you for this interesting consult; we will continue to follow closely with 

a guarded renal prognosis.











Subjective


Date of service: 05/23/20


Principal diagnosis: Ac hypoxemic and hypercapnic resp failure; AE-COPD; NSTEMI;

MILAN; HFrEF


Interval history: 





Remains in ICU, now on HFNC, off pressors.  Remains altered.





Objective





- Exam


Narrative Exam: 





Constitutional: mild acute distress, laying in bed on HFNC, minimally responsive


Head: NC/AT


Neck: supple


Lungs: coarse breath sounds


CV: RRR, no M/R/G


Abdomen: soft, non-tender, bowel sounds present


Back: nontender


Extremities: no edema, pulses WNL


Skin: intact


Neuro: confused





- Vital Signs


Vital signs: 


                               Vital Signs - 12hr











  05/23/20 05/23/20 05/23/20





  03:00 03:10 03:16


 


Temperature   


 


Pulse Rate 80 73 92 H


 


Pulse Rate [   





Bilateral   





Throughout]   


 


Pulse Rate [   





From Monitor]   


 


Respiratory 27 H 28 H 25 H





Rate   


 


Respiratory   





Rate [Bilateral   





Throughout]   


 


Blood Pressure 121/75 121/75 121/75


 


O2 Sat by Pulse 96 95 97





Oximetry   














  05/23/20 05/23/20 05/23/20





  03:30 03:46 04:00


 


Temperature   97.2 F L


 


Pulse Rate 93 H 74 78


 


Pulse Rate [   





Bilateral   





Throughout]   


 


Pulse Rate [   90





From Monitor]   


 


Respiratory 24 18 23





Rate   


 


Respiratory   





Rate [Bilateral   





Throughout]   


 


Blood Pressure 121/75 118/73 137/90


 


O2 Sat by Pulse 97 96 95





Oximetry   














  05/23/20 05/23/20 05/23/20





  04:16 04:30 04:46


 


Temperature   


 


Pulse Rate 82 94 H 97 H


 


Pulse Rate [   





Bilateral   





Throughout]   


 


Pulse Rate [   





From Monitor]   


 


Respiratory 19 27 H 21





Rate   


 


Respiratory   





Rate [Bilateral   





Throughout]   


 


Blood Pressure 137/90 137/90 126/70


 


O2 Sat by Pulse 95 96 96





Oximetry   














  05/23/20 05/23/20 05/23/20





  05:00 05:16 05:29


 


Temperature   


 


Pulse Rate 90 89 93 H


 


Pulse Rate [   





Bilateral   





Throughout]   


 


Pulse Rate [   





From Monitor]   


 


Respiratory 28 H 21 





Rate   


 


Respiratory   





Rate [Bilateral   





Throughout]   


 


Blood Pressure 137/90 109/70 109/70


 


O2 Sat by Pulse 95 95 





Oximetry   














  05/23/20 05/23/20 05/23/20





  05:30 05:46 06:00


 


Temperature   


 


Pulse Rate 93 H 90 91 H


 


Pulse Rate [   





Bilateral   





Throughout]   


 


Pulse Rate [   





From Monitor]   


 


Respiratory 21 21 22





Rate   


 


Respiratory   





Rate [Bilateral   





Throughout]   


 


Blood Pressure 118/76 118/76 109/70


 


O2 Sat by Pulse 95 96 95





Oximetry   














  05/23/20 05/23/20 05/23/20





  06:16 06:30 06:46


 


Temperature   


 


Pulse Rate 92 H 87 73


 


Pulse Rate [   





Bilateral   





Throughout]   


 


Pulse Rate [   





From Monitor]   


 


Respiratory 24 21 24





Rate   


 


Respiratory   





Rate [Bilateral   





Throughout]   


 


Blood Pressure 109/65 124/70 124/70


 


O2 Sat by Pulse 95 96 96





Oximetry   














  05/23/20 05/23/20 05/23/20





  07:00 07:16 07:30


 


Temperature   


 


Pulse Rate 93 H 93 H 93 H


 


Pulse Rate [   





Bilateral   





Throughout]   


 


Pulse Rate [   





From Monitor]   


 


Respiratory 24 19 24





Rate   


 


Respiratory   





Rate [Bilateral   





Throughout]   


 


Blood Pressure 114/78 114/78 114/78


 


O2 Sat by Pulse 94 95 95





Oximetry   














  05/23/20 05/23/20 05/23/20





  07:46 07:56 08:00


 


Temperature   97.5 F L


 


Pulse Rate 92 H 91 H 87


 


Pulse Rate [  91 H 





Bilateral   





Throughout]   


 


Pulse Rate [   





From Monitor]   


 


Respiratory 28 H 29 H 27 H





Rate   


 


Respiratory  28 H 





Rate [Bilateral   





Throughout]   


 


Blood Pressure 123/66 123/66 123/66


 


O2 Sat by Pulse 94 96 95





Oximetry   














  05/23/20 05/23/20 05/23/20





  08:16 08:30 08:46


 


Temperature   


 


Pulse Rate 93 H 91 H 96 H


 


Pulse Rate [   





Bilateral   





Throughout]   


 


Pulse Rate [   





From Monitor]   


 


Respiratory 28 H 28 H 29 H





Rate   


 


Respiratory   





Rate [Bilateral   





Throughout]   


 


Blood Pressure 125/66 125/66 106/63


 


O2 Sat by Pulse 96 96 94





Oximetry   














  05/23/20 05/23/20 05/23/20





  09:00 09:16 09:30


 


Temperature   


 


Pulse Rate 91 H 85 91 H


 


Pulse Rate [   





Bilateral   





Throughout]   


 


Pulse Rate [   





From Monitor]   


 


Respiratory 24 28 H 28 H





Rate   


 


Respiratory   





Rate [Bilateral   





Throughout]   


 


Blood Pressure 106/63 96/66 96/66


 


O2 Sat by Pulse 95 96 97





Oximetry   














  05/23/20 05/23/20 05/23/20





  09:46 10:00 10:01


 


Temperature   


 


Pulse Rate 90 96 H 


 


Pulse Rate [   





Bilateral   





Throughout]   


 


Pulse Rate [   89





From Monitor]   


 


Respiratory 25 H 28 H 24





Rate   


 


Respiratory   





Rate [Bilateral   





Throughout]   


 


Blood Pressure 99/73 105/78 


 


O2 Sat by Pulse 93 89 95





Oximetry   














  05/23/20 05/23/20 05/23/20





  10:16 10:30 10:46


 


Temperature   


 


Pulse Rate 92 H 98 H 94 H


 


Pulse Rate [   





Bilateral   





Throughout]   


 


Pulse Rate [   





From Monitor]   


 


Respiratory 28 H 17 21





Rate   


 


Respiratory   





Rate [Bilateral   





Throughout]   


 


Blood Pressure 105/78 105/78 120/76


 


O2 Sat by Pulse 91 90 88





Oximetry   














  05/23/20 05/23/20 05/23/20





  10:50 11:00 11:16


 


Temperature   


 


Pulse Rate  87 86


 


Pulse Rate [   





Bilateral   





Throughout]   


 


Pulse Rate [   





From Monitor]   


 


Respiratory  24 25 H





Rate   


 


Respiratory   





Rate [Bilateral   





Throughout]   


 


Blood Pressure  127/86 127/86


 


O2 Sat by Pulse 95 95 99





Oximetry   














  05/23/20 05/23/20 05/23/20





  11:30 11:46 12:00


 


Temperature   


 


Pulse Rate 78 79 87


 


Pulse Rate [   





Bilateral   





Throughout]   


 


Pulse Rate [   87





From Monitor]   


 


Respiratory 22 21 22





Rate   


 


Respiratory   





Rate [Bilateral   





Throughout]   


 


Blood Pressure 127/81 127/81 121/84


 


O2 Sat by Pulse 99 99 93





Oximetry   














  05/23/20 05/23/20





  12:39 14:17


 


Temperature  


 


Pulse Rate 96 H 


 


Pulse Rate [  95 H





Bilateral  





Throughout]  


 


Pulse Rate [  





From Monitor]  


 


Respiratory  





Rate  


 


Respiratory  20





Rate [Bilateral  





Throughout]  


 


Blood Pressure 112/86 


 


O2 Sat by Pulse  





Oximetry  














- Lab





                                 05/23/20 05:15





                                 05/23/20 05:15


                             Most recent lab results











ABG pH  7.317 pH Units (7.350-7.450)  L  05/23/20  03:30    


 


ABG pCO2  55.1 mm Hg  05/23/20  03:30    


 


ABG pO2  73.4 mm Hg (80.0-90.0)  L  05/23/20  03:30    


 


ABG HCO3  27.6 mmol/L (20.0-26.0)  H  05/23/20  03:30    


 


ABG O2 Saturation  94.3 % (95.0-99.0)  L  05/23/20  03:30    


 


Calcium  7.5 mg/dL (8.4-10.2)  L  05/23/20  05:15    


 


Phosphorus  7.00 mg/dL (2.5-4.5)  H  05/21/20  05:35    


 


Magnesium  1.80 mg/dL (1.7-2.3)   05/23/20  05:15    


 


Urine Creatinine  216.1 mg/dL (0.1-20.0)  H  05/19/20  Unknown


 


Urine Sodium  10 mmol/L  05/19/20  Unknown


 


Urine Total Protein  62 mg/dL (5-11.8)  H  05/19/20  Unknown














Medications & Allergies





- Medications


Allergies/Adverse Reactions: 


                                    Allergies





No Known Allergies Allergy (Unverified 05/13/18 11:23)


   








Home Medications: 


                                Home Medications











 Medication  Instructions  Recorded  Confirmed  Last Taken  Type


 


No Known Home Medications [No  05/16/20 05/16/20 Unknown History





Reported Home Medications]     











Active Medications: 














Generic Name Dose Route Start Last Admin





  Trade Name Freq  PRN Reason Stop Dose Admin


 


Acetaminophen  650 mg  05/16/20 23:45 





  Tylenol  PO  





  Q4H PRN  





  Pain MILD(1-3)/Fever >100.5/HA  


 


Albuterol  2.5 mg  05/17/20 14:57 





  Proventil  IH  





  Q4HRT PRN  





  Shortness Of Breath  


 


Albuterol/Ipratropium  1 ampul  05/17/20 20:00  05/23/20 14:17





  Duoneb *Not For Prn Use*  IH   1 ampul





  TIDRT AMANDA   Administration


 


Lipase/Protease/Amylase  1 each  05/20/20 15:33 





  Pancrewillie Cloud 10,500 Unit  FEEDTUBE  





  PRN PRN  





  For Clogged Feeding Tube  


 


Arformoterol Tartrate  15 mcg  05/17/20 20:00  05/23/20 07:56





  Brovana Nebu  IH   15 mcg





  Q12HRT AMANDA   Administration


 


Aspirin  325 mg  05/17/20 10:00  05/23/20 10:01





  Ecotrin  PO   325 mg





  QDAY AMANDA   Administration


 


Atorvastatin Calcium  40 mg  05/17/20 22:00  05/22/20 23:40





  Lipitor  PO   40 mg





  QHS AMANDA   Administration


 


Budesonide  0.5 mg  05/17/20 20:00  05/23/20 07:56





  Pulmicort  IH   0.5 mg





  Q12HRT AMANDA   Administration


 


Dextrose  50 ml  05/20/20 17:44 





  D50w (25gm) Syringe  IV  





  Q30MIN PRN  





  Hypoglycemia  





  Protocol  


 


Diltiazem HCl  30 mg  05/17/20 18:00  05/23/20 12:39





  Cardizem  PO   30 mg





  Q6HR AMANDA   Administration


 


Famotidine  20 mg  05/21/20 10:00  05/23/20 10:01





  Pepcid  IV   20 mg





  QDAY AMANDA   Administration


 


Norepinephrine  4 mg in 250 mls @ 7.5 mls/hr  05/20/20 18:00  05/21/20 16:00





  Levophed Drip 4 Mg/Ns 250 Ml  IV   0 mcg/min





  TITR AMANDA   0 mls/hr





    Titration





  Protocol  





  2 MCG/MIN  


 


Vasopressin 20 unit/ Sodium  101 mls @ 9.09 mls/hr  05/20/20 18:00  05/20/20 

18:30





  Chloride  IV   0 units/min





  TITR AMANDA   0 mls/hr





    Titration





  Protocol  





  0.03 UNITS/MIN  


 


Magnesium Hydroxide  30 ml  05/16/20 23:45 





  Milk Of Magnesia  PO  





  Q4H PRN  





  Constipation  


 


Methylprednisolone Sodium Succinate  40 mg  05/21/20 10:00  05/23/20 10:01





  Solu-Medrol  IV   40 mg





  Q12HR AMANDA   Administration


 


Morphine Sulfate  2 mg  05/16/20 23:45 





  Morphine  IV  





  Q5MIN PRN  





  Chest Pain unrelieved by NTG  


 


Nitroglycerin  0.4 mg  05/16/20 23:45 





  Nitrostat  SL  





  .Q5MIN PRN  





  Chest Pain  


 


Ondansetron HCl  4 mg  05/16/20 23:45 





  Zofran  IV  





  Q8H PRN  





  Nausea And Vomiting  


 


Simple Syrup  15 ml  05/20/20 15:33 





  Simple Syrup  FEEDTUBE  





  PRN PRN  





  Hypoglycemia  


 


Simple Syrup  30 ml  05/20/20 15:33 





  Simple Syrup  FEEDTUBE  





  PRN PRN  





  Hypoglycemia  


 


Sodium Bicarbonate  325 mg  05/20/20 15:33 





  Sodium Bicarbonate  FEEDTUBE  





  PRN PRN  





  For Clogged Feeding Tube  


 


Sodium Chloride  10 ml  05/17/20 10:00  05/23/20 10:02





  Sodium Chloride Flush Syringe 10 Ml  IV   10 ml





  BID AMANDA   Administration


 


Sodium Chloride  10 ml  05/16/20 23:45 





  Sodium Chloride Flush Syringe 10 Ml  IV  





  PRN PRN  





  LINE FLUSH

## 2020-05-23 NOTE — PROGRESS NOTE
Assessment and Plan


Acute Respiratory distress with hypoxia and hpercapnia


COPD


Severe pulmonary hypertension- probably combination of Group 2 and 3


-PASP 82


Hyponatremia


Acute toxic-metabolic encephalaopthy


Tobacco use disorder/Nicotine dependence


Non-ST elevation MI 


Acute on chronic congestive heart failure-systolic, EF 20-25%


Acute kidney injury secondary to vasomotor nephropathy and diuresis


Bilateral lower extremity edema


Anemia of chronic disease 


Morbid obesity


Thrombocytopenia











- keep  BIPAP qhs and q4 prn, trial HFOT with oxygen restrictive strategies


- continue enteral nutrition as tolerated


- continue aspiration precautions, HOB >40


- continue systemic steroids with taper


- continue inhaled corticosteroids


- empiric CAP AB


-Stop IVF in view of heart failure, order HIT


- PT/OT as tolerated


- mobility protocols for pressure ulcer prophylaxis


- accuchecks with glycemic control per SSI for target BG < 140-180 mg/dl


- soft restraints as pulling at NGT , face mask


-Will discontinue femoral CVC, once peripheral IVs can be palced


- NSTEMI per cardiology team


Discussed with RT at the bedside








-Supplemental oxygen wean to keep O2 sats 88-90%


-Bronchodilators- SAIDA/JULIEN and inhaled corticosteroids


-Currently on NSTEMI protocol per Cardiology


-VTE prophylaxis- on therapeutic heparin for NSTEMI


-Monitor hemoglobin trends, transfuse as indicated to keep hgB >7g/dL


-Heart failure measures per cardiology


-Nicotine withdrawal precautions


-Smoking cessation counselling


-Continue management of underlying COPD


-Chronic home medications as clinically indicated


-Will need right and left heart cath 


-Sleep apnea evaluation and treatment as outpatient





CONDITION- CRITICAL


PROGNOSIS- GUARDED


CODE STATUS- FULL CODE





Life threatening condition: Severe pulmonary HTN, acute and chronic  hypoxic 

respiratory failure on high flow oxygen, acute on chronic systolic heart 

failure, hyponatremia, symptomatic hypoglycemia


Morbidity/Mortality: High


complexity of medical decision making: High








The high probability of a clinically significant, sudden or life-threatening 

deterioration of the [respiratory, cardiovascular,neurologic, renal ] system(s) 

required my full and direct attention, intervention and personal management. The

aggregate critical care time was [31] minutes without overlap. Time includes 

spent on;





[x] Data Review and interpretation 





[x] Patient assessment and monitoring of vital signs 





[x] Documentation 





[x] Medication orders and management








Subjective


Date of service: 05/23/20


Principal diagnosis: Ac hypoxemic and hypercapnic resp failure; AE-COPD; NSTEMI;

 MILAN; HFrEF


Interval history: 





Patient is seen today for: Acute hypoxemic and hypercapnic respiratory failure; 

AE-COPD; Severe Pulm HTN; Acute toxic-metabolic encephalaopthy; NSTEMI; MILAN; 

HFrEF





Seen and examined at bedside; 24hour events reviewed; nursing and respiratory 

care staff consulted; no adverse overnight events reported to me; resting 

peacefully in bed; remains on continuous BIPAP with improving hypercapnia; still

 lethargic  but tolerating enteral nutrition via small bowel feeding tube


She is on IVFs. Vitals, labs, medications, chart and imaging reviewed. No 

fevers, no vomiting, no diarrhea








Objective


                               Vital Signs - 12hr











  05/22/20 05/22/20 05/22/20





  22:16 22:30 22:46


 


Temperature   


 


Pulse Rate 77 84 84


 


Pulse Rate [   





Bilateral   





Throughout]   


 


Pulse Rate [   





From Monitor]   


 


Respiratory 18 27 H 18





Rate   


 


Respiratory   





Rate [Bilateral   





Throughout]   


 


Blood Pressure 119/80 132/76 132/76


 


O2 Sat by Pulse 98 97 97





Oximetry   














  05/22/20 05/22/20 05/22/20





  23:00 23:16 23:30


 


Temperature   


 


Pulse Rate 94 H 93 H 92 H


 


Pulse Rate [   





Bilateral   





Throughout]   


 


Pulse Rate [   





From Monitor]   


 


Respiratory 28 H 28 H 25 H





Rate   


 


Respiratory   





Rate [Bilateral   





Throughout]   


 


Blood Pressure 132/76 132/76 132/76


 


O2 Sat by Pulse 98 96 97





Oximetry   














  05/22/20 05/22/20 05/22/20





  23:35 23:46 23:57


 


Temperature   


 


Pulse Rate 96 H 90 77


 


Pulse Rate [   





Bilateral   





Throughout]   


 


Pulse Rate [   





From Monitor]   


 


Respiratory  27 H 28 H





Rate   


 


Respiratory   





Rate [Bilateral   





Throughout]   


 


Blood Pressure 120/74 131/87 120/74


 


O2 Sat by Pulse  98 98





Oximetry   














  05/23/20 05/23/20 05/23/20





  00:00 00:16 00:30


 


Temperature 97.0 F L  


 


Pulse Rate 89 91 H 93 H


 


Pulse Rate [   





Bilateral   





Throughout]   


 


Pulse Rate [ 90  





From Monitor]   


 


Respiratory 25 H 29 H 25 H





Rate   


 


Respiratory   





Rate [Bilateral   





Throughout]   


 


Blood Pressure 131/84 131/84 118/79


 


O2 Sat by Pulse 97 96 97





Oximetry   














  05/23/20 05/23/20 05/23/20





  00:46 01:00 01:16


 


Temperature   


 


Pulse Rate 67 83 61


 


Pulse Rate [   





Bilateral   





Throughout]   


 


Pulse Rate [   





From Monitor]   


 


Respiratory 27 H 25 H 28 H





Rate   


 


Respiratory   





Rate [Bilateral   





Throughout]   


 


Blood Pressure 118/79 118/79 116/67


 


O2 Sat by Pulse 96 95 95





Oximetry   














  05/23/20 05/23/20 05/23/20





  01:30 01:46 02:00


 


Temperature   


 


Pulse Rate 93 H 68 75


 


Pulse Rate [   





Bilateral   





Throughout]   


 


Pulse Rate [   





From Monitor]   


 


Respiratory 20 17 28 H





Rate   


 


Respiratory   





Rate [Bilateral   





Throughout]   


 


Blood Pressure 114/89 114/89 124/75


 


O2 Sat by Pulse 95 97 95





Oximetry   














  05/23/20 05/23/20 05/23/20





  02:15 02:30 02:46


 


Temperature   


 


Pulse Rate 71 65 72


 


Pulse Rate [   





Bilateral   





Throughout]   


 


Pulse Rate [   





From Monitor]   


 


Respiratory 21 24 28 H





Rate   


 


Respiratory   





Rate [Bilateral   





Throughout]   


 


Blood Pressure 124/75  124/75


 


O2 Sat by Pulse 96 96 95





Oximetry   














  05/23/20 05/23/20 05/23/20





  03:00 03:10 03:16


 


Temperature   


 


Pulse Rate 80 73 92 H


 


Pulse Rate [   





Bilateral   





Throughout]   


 


Pulse Rate [   





From Monitor]   


 


Respiratory 27 H 28 H 25 H





Rate   


 


Respiratory   





Rate [Bilateral   





Throughout]   


 


Blood Pressure 121/75 121/75 121/75


 


O2 Sat by Pulse 96 95 97





Oximetry   














  05/23/20 05/23/20 05/23/20





  03:30 03:46 04:00


 


Temperature   97.2 F L


 


Pulse Rate 93 H 74 78


 


Pulse Rate [   





Bilateral   





Throughout]   


 


Pulse Rate [   90





From Monitor]   


 


Respiratory 24 18 23





Rate   


 


Respiratory   





Rate [Bilateral   





Throughout]   


 


Blood Pressure 121/75 118/73 137/90


 


O2 Sat by Pulse 97 96 95





Oximetry   














  05/23/20 05/23/20 05/23/20





  04:16 04:30 04:46


 


Temperature   


 


Pulse Rate 82 94 H 97 H


 


Pulse Rate [   





Bilateral   





Throughout]   


 


Pulse Rate [   





From Monitor]   


 


Respiratory 19 27 H 21





Rate   


 


Respiratory   





Rate [Bilateral   





Throughout]   


 


Blood Pressure 137/90 137/90 126/70


 


O2 Sat by Pulse 95 96 96





Oximetry   














  05/23/20 05/23/20 05/23/20





  05:00 05:16 05:29


 


Temperature   


 


Pulse Rate 90 89 93 H


 


Pulse Rate [   





Bilateral   





Throughout]   


 


Pulse Rate [   





From Monitor]   


 


Respiratory 28 H 21 





Rate   


 


Respiratory   





Rate [Bilateral   





Throughout]   


 


Blood Pressure 137/90 109/70 109/70


 


O2 Sat by Pulse 95 95 





Oximetry   














  05/23/20 05/23/20 05/23/20





  05:30 05:46 06:00


 


Temperature   


 


Pulse Rate 93 H 90 91 H


 


Pulse Rate [   





Bilateral   





Throughout]   


 


Pulse Rate [   





From Monitor]   


 


Respiratory 21 21 22





Rate   


 


Respiratory   





Rate [Bilateral   





Throughout]   


 


Blood Pressure 118/76 118/76 109/70


 


O2 Sat by Pulse 95 96 95





Oximetry   














  05/23/20 05/23/20 05/23/20





  06:16 06:30 06:46


 


Temperature   


 


Pulse Rate 92 H 87 73


 


Pulse Rate [   





Bilateral   





Throughout]   


 


Pulse Rate [   





From Monitor]   


 


Respiratory 24 21 24





Rate   


 


Respiratory   





Rate [Bilateral   





Throughout]   


 


Blood Pressure 109/65 124/70 124/70


 


O2 Sat by Pulse 95 96 96





Oximetry   














  05/23/20 05/23/20 05/23/20





  07:00 07:16 07:56


 


Temperature   


 


Pulse Rate 93 H 93 H 91 H


 


Pulse Rate [   91 H





Bilateral   





Throughout]   


 


Pulse Rate [   





From Monitor]   


 


Respiratory 24 19 29 H





Rate   


 


Respiratory   28 H





Rate [Bilateral   





Throughout]   


 


Blood Pressure 114/78 114/78 123/66


 


O2 Sat by Pulse 94 95 96





Oximetry   











Constitutional: appears uncomfortable, other (on BIPAP via FFM)


Eyes: non-icteric


ENT: oropharynx dry


Neck: supple, no lymphadenopathy


Effort: mildly labored


Ascultation: Bilateral: diminished breath sounds (poor AE bilaterally), rhonchi,

other (prolonged expiratory phase)


Percussion: Bilateral: not dull


Cardiovascular: irregular rhythm, other ( S1,S2)


Gastrointestinal: normoactive bowel sounds, soft, non-tender, non-distended


Integumentary: rash, other (lower extemity edema +)


Extremities: no cyanosis, pink and warm, pulses normal, no ischemia or 

petechiae, edema


Neurologic: non-focal exam (moves all extemities), pupils equal and round, other

(lethargic)


Psychiatric: other (unable to assess secondary to mental status)


CBC and BMP: 


                                 05/23/20 05:15





                                 05/24/20 03:15


ABG, PT/INR, D-dimer: 


ABG











ABG pH  7.317 pH Units (7.350-7.450)  L  05/23/20  03:30    


 


ABG pCO2  55.1 mm Hg  05/23/20  03:30    


 


ABG pO2  73.4 mm Hg (80.0-90.0)  L  05/23/20  03:30    


 


ABG O2 Saturation  94.3 % (95.0-99.0)  L  05/23/20  03:30    





PT/INR, D-dimer











PT  15.3 Sec. (12.2-14.9)  H  05/17/20  03:25    


 


INR  1.20  (0.87-1.13)  H  05/17/20  03:25    








Abnormal lab findings: 


                                  Abnormal Labs











  05/16/20 05/16/20 05/17/20





  22:13 22:13 00:00


 


WBC   


 


Hct  43.2 H   45.8 H


 


MCV  107 H   106 H


 


MCH  34 H   33 H


 


RDW  20.2 H   19.7 H


 


Plt Count   


 


Lymph % (Auto)   


 


Lymph #   


 


Seg Neutrophils %  77.3 H   78.8 H


 


Seg Neuts % (Manual)   


 


Lymphocytes % (Manual)   


 


Nucleated RBC %   


 


Seg Neutrophils #   


 


Seg Neutrophils # Man   


 


Lymphocytes # (Manual)   


 


PT   


 


INR   


 


Heparin Anti-Xa Level   


 


ABG pH   


 


ABG pO2   


 


ABG HCO3   


 


ABG O2 Saturation   


 


Oxyhemoglobin   


 


Sodium   130 L 


 


Potassium   


 


Chloride   85.0 L 


 


Carbon Dioxide   35 H 


 


BUN   


 


Creatinine   


 


Glucose   112 H 


 


POC Glucose   


 


Calcium   


 


Phosphorus   


 


Total Bilirubin   


 


AST   


 


ALT   5 L 


 


Troponin T   


 


NT-Pro-B Natriuret Pep   3719 H 


 


Total Protein   


 


Albumin   3.4 L 


 


LDL Cholesterol Direct   


 


Urine Creatinine   


 


Urine Total Protein   














  05/17/20 05/17/20 05/17/20





  00:00 00:12 03:25


 


WBC   


 


Hct   


 


MCV   


 


MCH   


 


RDW   


 


Plt Count   


 


Lymph % (Auto)   


 


Lymph #   


 


Seg Neutrophils %   


 


Seg Neuts % (Manual)   


 


Lymphocytes % (Manual)   


 


Nucleated RBC %   


 


Seg Neutrophils #   


 


Seg Neutrophils # Man   


 


Lymphocytes # (Manual)   


 


PT   


 


INR   


 


Heparin Anti-Xa Level   


 


ABG pH   


 


ABG pO2   


 


ABG HCO3   


 


ABG O2 Saturation   


 


Oxyhemoglobin   


 


Sodium  131 L  


 


Potassium   


 


Chloride  85.3 L  


 


Carbon Dioxide  35 H  


 


BUN   


 


Creatinine   


 


Glucose  108 H  


 


POC Glucose   


 


Calcium   


 


Phosphorus   


 


Total Bilirubin   


 


AST   


 


ALT   


 


Troponin T   0.051 H D  0.113 H* D


 


NT-Pro-B Natriuret Pep   


 


Total Protein   


 


Albumin   


 


LDL Cholesterol Direct   33 L 


 


Urine Creatinine   


 


Urine Total Protein   














  05/17/20 05/17/20 05/17/20





  03:25 03:25 03:25


 


WBC   


 


Hct   


 


MCV  107 H  


 


MCH  34 H  


 


RDW  20.1 H  


 


Plt Count   


 


Lymph % (Auto)   


 


Lymph #   


 


Seg Neutrophils %  77.0 H  


 


Seg Neuts % (Manual)   


 


Lymphocytes % (Manual)   


 


Nucleated RBC %   


 


Seg Neutrophils #   


 


Seg Neutrophils # Man   


 


Lymphocytes # (Manual)   


 


PT   15.3 H 


 


INR   1.20 H 


 


Heparin Anti-Xa Level   


 


ABG pH   


 


ABG pO2   


 


ABG HCO3   


 


ABG O2 Saturation   


 


Oxyhemoglobin   


 


Sodium    133 L


 


Potassium   


 


Chloride    86.1 L


 


Carbon Dioxide    36 H


 


BUN   


 


Creatinine   


 


Glucose    116 H


 


POC Glucose   


 


Calcium   


 


Phosphorus   


 


Total Bilirubin   


 


AST   


 


ALT   


 


Troponin T   


 


NT-Pro-B Natriuret Pep   


 


Total Protein   


 


Albumin   


 


LDL Cholesterol Direct   


 


Urine Creatinine   


 


Urine Total Protein   














  05/17/20 05/17/20 05/18/20





  05:29 13:51 04:45


 


WBC   


 


Hct   


 


MCV    107 H


 


MCH    34 H


 


RDW    20.1 H


 


Plt Count    136 L


 


Lymph % (Auto)   


 


Lymph #   


 


Seg Neutrophils %   


 


Seg Neuts % (Manual)   


 


Lymphocytes % (Manual)   


 


Nucleated RBC %   


 


Seg Neutrophils #   


 


Seg Neutrophils # Man   


 


Lymphocytes # (Manual)   


 


PT   


 


INR   


 


Heparin Anti-Xa Level   0.14 L 


 


ABG pH   


 


ABG pO2   


 


ABG HCO3   


 


ABG O2 Saturation   


 


Oxyhemoglobin   


 


Sodium   


 


Potassium   


 


Chloride   


 


Carbon Dioxide   


 


BUN   


 


Creatinine   


 


Glucose   


 


POC Glucose   


 


Calcium   


 


Phosphorus   


 


Total Bilirubin   


 


AST   


 


ALT   


 


Troponin T  0.126 H*  


 


NT-Pro-B Natriuret Pep   


 


Total Protein   


 


Albumin   


 


LDL Cholesterol Direct   


 


Urine Creatinine   


 


Urine Total Protein   














  05/18/20 05/18/20 05/18/20





  04:45 16:23 16:23


 


WBC   


 


Hct   


 


MCV   


 


MCH   


 


RDW   


 


Plt Count   


 


Lymph % (Auto)   


 


Lymph #   


 


Seg Neutrophils %   


 


Seg Neuts % (Manual)   


 


Lymphocytes % (Manual)   


 


Nucleated RBC %   


 


Seg Neutrophils #   


 


Seg Neutrophils # Man   


 


Lymphocytes # (Manual)   


 


PT   


 


INR   


 


Heparin Anti-Xa Level   0.27 L 


 


ABG pH   


 


ABG pO2   


 


ABG HCO3   


 


ABG O2 Saturation   


 


Oxyhemoglobin   


 


Sodium  127 L   123 L


 


Potassium  5.1 H D  


 


Chloride  83.9 L  


 


Carbon Dioxide  32 H  


 


BUN   


 


Creatinine  1.3 H D  


 


Glucose  116 H  


 


POC Glucose   


 


Calcium  8.3 L  


 


Phosphorus   


 


Total Bilirubin   


 


AST   


 


ALT   


 


Troponin T   


 


NT-Pro-B Natriuret Pep   


 


Total Protein   


 


Albumin   


 


LDL Cholesterol Direct   


 


Urine Creatinine   


 


Urine Total Protein   














  05/18/20 05/19/20 05/19/20





  18:42 09:32 09:32


 


WBC   11.4 H 


 


Hct   


 


MCV   106 H 


 


MCH   33 H 


 


RDW   19.5 H 


 


Plt Count   131 L 


 


Lymph % (Auto)   


 


Lymph #   


 


Seg Neutrophils %   


 


Seg Neuts % (Manual)   


 


Lymphocytes % (Manual)   


 


Nucleated RBC %   


 


Seg Neutrophils #   


 


Seg Neutrophils # Man   


 


Lymphocytes # (Manual)   


 


PT   


 


INR   


 


Heparin Anti-Xa Level   


 


ABG pH   


 


ABG pO2   


 


ABG HCO3   


 


ABG O2 Saturation   


 


Oxyhemoglobin   


 


Sodium    128 L


 


Potassium   


 


Chloride    82.4 L


 


Carbon Dioxide    31 H


 


BUN    25 H


 


Creatinine    1.9 H


 


Glucose    61 L


 


POC Glucose  135 H  


 


Calcium    8.2 L


 


Phosphorus   


 


Total Bilirubin   


 


AST   


 


ALT   


 


Troponin T   


 


NT-Pro-B Natriuret Pep   


 


Total Protein   


 


Albumin   


 


LDL Cholesterol Direct   


 


Urine Creatinine   


 


Urine Total Protein   














  05/19/20 05/19/20 05/19/20





  13:37 19:24 19:33


 


WBC   


 


Hct   


 


MCV   


 


MCH   


 


RDW   


 


Plt Count   


 


Lymph % (Auto)   


 


Lymph #   


 


Seg Neutrophils %   


 


Seg Neuts % (Manual)   


 


Lymphocytes % (Manual)   


 


Nucleated RBC %   


 


Seg Neutrophils #   


 


Seg Neutrophils # Man   


 


Lymphocytes # (Manual)   


 


PT   


 


INR   


 


Heparin Anti-Xa Level  < 0.10 L  


 


ABG pH   


 


ABG pO2   


 


ABG HCO3   


 


ABG O2 Saturation   


 


Oxyhemoglobin   


 


Sodium   


 


Potassium   


 


Chloride   


 


Carbon Dioxide   


 


BUN   


 


Creatinine   


 


Glucose   


 


POC Glucose   < 40 L  > 500 H


 


Calcium   


 


Phosphorus   


 


Total Bilirubin   


 


AST   


 


ALT   


 


Troponin T   


 


NT-Pro-B Natriuret Pep   


 


Total Protein   


 


Albumin   


 


LDL Cholesterol Direct   


 


Urine Creatinine   


 


Urine Total Protein   














  05/19/20 05/19/20 05/19/20





  20:01 20:10 21:03


 


WBC   


 


Hct   


 


MCV   


 


MCH   


 


RDW   


 


Plt Count   


 


Lymph % (Auto)   


 


Lymph #   


 


Seg Neutrophils %   


 


Seg Neuts % (Manual)   


 


Lymphocytes % (Manual)   


 


Nucleated RBC %   


 


Seg Neutrophils #   


 


Seg Neutrophils # Man   


 


Lymphocytes # (Manual)   


 


PT   


 


INR   


 


Heparin Anti-Xa Level  < 0.10 L  


 


ABG pH   


 


ABG pO2   


 


ABG HCO3   


 


ABG O2 Saturation   


 


Oxyhemoglobin   


 


Sodium   124 L 


 


Potassium   5.5 H D 


 


Chloride   82.4 L 


 


Carbon Dioxide   


 


BUN   31 H 


 


Creatinine   2.1 H 


 


Glucose   212 H 


 


POC Glucose    202 H


 


Calcium   8.0 L 


 


Phosphorus   


 


Total Bilirubin   


 


AST   


 


ALT   


 


Troponin T   


 


NT-Pro-B Natriuret Pep   


 


Total Protein   


 


Albumin   


 


LDL Cholesterol Direct   


 


Urine Creatinine   


 


Urine Total Protein   














  05/19/20 05/19/20 05/19/20





  21:53 23:18 Unknown


 


WBC   


 


Hct   


 


MCV   


 


MCH   


 


RDW   


 


Plt Count   


 


Lymph % (Auto)   


 


Lymph #   


 


Seg Neutrophils %   


 


Seg Neuts % (Manual)   


 


Lymphocytes % (Manual)   


 


Nucleated RBC %   


 


Seg Neutrophils #   


 


Seg Neutrophils # Man   


 


Lymphocytes # (Manual)   


 


PT   


 


INR   


 


Heparin Anti-Xa Level   


 


ABG pH   


 


ABG pO2   


 


ABG HCO3   


 


ABG O2 Saturation   


 


Oxyhemoglobin   


 


Sodium   


 


Potassium   


 


Chloride   


 


Carbon Dioxide   


 


BUN   


 


Creatinine   


 


Glucose   


 


POC Glucose  177 H  203 H 


 


Calcium   


 


Phosphorus   


 


Total Bilirubin   


 


AST   


 


ALT   


 


Troponin T   


 


NT-Pro-B Natriuret Pep   


 


Total Protein   


 


Albumin   


 


LDL Cholesterol Direct   


 


Urine Creatinine    216.1 H


 


Urine Total Protein    62 H














  05/20/20 05/20/20 05/20/20





  00:15 02:20 04:34


 


WBC   


 


Hct   


 


MCV   


 


MCH   


 


RDW   


 


Plt Count   


 


Lymph % (Auto)   


 


Lymph #   


 


Seg Neutrophils %   


 


Seg Neuts % (Manual)   


 


Lymphocytes % (Manual)   


 


Nucleated RBC %   


 


Seg Neutrophils #   


 


Seg Neutrophils # Man   


 


Lymphocytes # (Manual)   


 


PT   


 


INR   


 


Heparin Anti-Xa Level   


 


ABG pH   


 


ABG pO2   


 


ABG HCO3   


 


ABG O2 Saturation   


 


Oxyhemoglobin   


 


Sodium    129 L


 


Potassium   


 


Chloride    83.7 L


 


Carbon Dioxide   


 


BUN    36 H


 


Creatinine    2.8 H


 


Glucose    129 H


 


POC Glucose  156 H  149 H 


 


Calcium    7.5 L


 


Phosphorus   


 


Total Bilirubin    2.30 H


 


AST    3636 H


 


ALT    1241 H


 


Troponin T   


 


NT-Pro-B Natriuret Pep   


 


Total Protein   


 


Albumin    3.2 L


 


LDL Cholesterol Direct   


 


Urine Creatinine   


 


Urine Total Protein   














  05/20/20 05/20/20 05/20/20





  04:34 05:19 12:13


 


WBC   


 


Hct   


 


MCV   


 


MCH   


 


RDW   


 


Plt Count   


 


Lymph % (Auto)   


 


Lymph #   


 


Seg Neutrophils %   


 


Seg Neuts % (Manual)   


 


Lymphocytes % (Manual)   


 


Nucleated RBC %   


 


Seg Neutrophils #   


 


Seg Neutrophils # Man   


 


Lymphocytes # (Manual)   


 


PT   


 


INR   


 


Heparin Anti-Xa Level  0.10 L  


 


ABG pH   


 


ABG pO2   


 


ABG HCO3   


 


ABG O2 Saturation   


 


Oxyhemoglobin   


 


Sodium   


 


Potassium   


 


Chloride   


 


Carbon Dioxide   


 


BUN   


 


Creatinine   


 


Glucose   


 


POC Glucose   115 H  69 L


 


Calcium   


 


Phosphorus   


 


Total Bilirubin   


 


AST   


 


ALT   


 


Troponin T   


 


NT-Pro-B Natriuret Pep   


 


Total Protein   


 


Albumin   


 


LDL Cholesterol Direct   


 


Urine Creatinine   


 


Urine Total Protein   














  05/20/20 05/20/20 05/20/20





  15:15 17:37 18:44


 


WBC   


 


Hct   


 


MCV   


 


MCH   


 


RDW   


 


Plt Count   


 


Lymph % (Auto)   


 


Lymph #   


 


Seg Neutrophils %   


 


Seg Neuts % (Manual)   


 


Lymphocytes % (Manual)   


 


Nucleated RBC %   


 


Seg Neutrophils #   


 


Seg Neutrophils # Man   


 


Lymphocytes # (Manual)   


 


PT   


 


INR   


 


Heparin Anti-Xa Level   


 


ABG pH  7.180 L*  


 


ABG pO2  70.1 L  


 


ABG HCO3  31.5 H  


 


ABG O2 Saturation  89.6 L  


 


Oxyhemoglobin  87.7 L  


 


Sodium   


 


Potassium   


 


Chloride   


 


Carbon Dioxide   


 


BUN   


 


Creatinine   


 


Glucose   


 


POC Glucose   68 L  160 H


 


Calcium   


 


Phosphorus   


 


Total Bilirubin   


 


AST   


 


ALT   


 


Troponin T   


 


NT-Pro-B Natriuret Pep   


 


Total Protein   


 


Albumin   


 


LDL Cholesterol Direct   


 


Urine Creatinine   


 


Urine Total Protein   














  05/20/20 05/20/20 05/20/20





  20:16 21:00 Unknown


 


WBC   


 


Hct   


 


MCV   


 


MCH   


 


RDW   


 


Plt Count   


 


Lymph % (Auto)   


 


Lymph #   


 


Seg Neutrophils %   


 


Seg Neuts % (Manual)   


 


Lymphocytes % (Manual)   


 


Nucleated RBC %   


 


Seg Neutrophils #   


 


Seg Neutrophils # Man   


 


Lymphocytes # (Manual)   


 


PT   


 


INR   


 


Heparin Anti-Xa Level   0.20 L  0.12 L


 


ABG pH  7.184 L*  


 


ABG pO2  77.9 L  


 


ABG HCO3  30.5 H  


 


ABG O2 Saturation  92.8 L  


 


Oxyhemoglobin  90.7 L  


 


Sodium   


 


Potassium   


 


Chloride   


 


Carbon Dioxide   


 


BUN   


 


Creatinine   


 


Glucose   


 


POC Glucose   


 


Calcium   


 


Phosphorus   


 


Total Bilirubin   


 


AST   


 


ALT   


 


Troponin T   


 


NT-Pro-B Natriuret Pep   


 


Total Protein   


 


Albumin   


 


LDL Cholesterol Direct   


 


Urine Creatinine   


 


Urine Total Protein   














  05/21/20 05/21/20 05/21/20





  00:06 00:08 05:35


 


WBC    15.0 H


 


Hct   


 


MCV    107 H


 


MCH    34 H


 


RDW    19.5 H


 


Plt Count    56 L


 


Lymph % (Auto)   


 


Lymph #   


 


Seg Neutrophils %   


 


Seg Neuts % (Manual)    92.0 H


 


Lymphocytes % (Manual)    5.0 L


 


Nucleated RBC %    4.0 H


 


Seg Neutrophils #   


 


Seg Neutrophils # Man    0.0 L


 


Lymphocytes # (Manual)    0.0 L


 


PT   


 


INR   


 


Heparin Anti-Xa Level   


 


ABG pH   7.230 L 


 


ABG pO2   79.8 L 


 


ABG HCO3   29.2 H 


 


ABG O2 Saturation   94.3 L 


 


Oxyhemoglobin   92.3 L 


 


Sodium   


 


Potassium   


 


Chloride   


 


Carbon Dioxide   


 


BUN   


 


Creatinine   


 


Glucose   


 


POC Glucose  185 H  


 


Calcium   


 


Phosphorus   


 


Total Bilirubin   


 


AST   


 


ALT   


 


Troponin T   


 


NT-Pro-B Natriuret Pep   


 


Total Protein   


 


Albumin   


 


LDL Cholesterol Direct   


 


Urine Creatinine   


 


Urine Total Protein   














  05/21/20 05/21/20 05/21/20





  05:35 06:07 12:33


 


WBC   


 


Hct   


 


MCV   


 


MCH   


 


RDW   


 


Plt Count   


 


Lymph % (Auto)   


 


Lymph #   


 


Seg Neutrophils %   


 


Seg Neuts % (Manual)   


 


Lymphocytes % (Manual)   


 


Nucleated RBC %   


 


Seg Neutrophils #   


 


Seg Neutrophils # Man   


 


Lymphocytes # (Manual)   


 


PT   


 


INR   


 


Heparin Anti-Xa Level   


 


ABG pH   


 


ABG pO2   


 


ABG HCO3   


 


ABG O2 Saturation   


 


Oxyhemoglobin   


 


Sodium  130 L  


 


Potassium   


 


Chloride  85.2 L  


 


Carbon Dioxide   


 


BUN  49 H  


 


Creatinine  3.4 H  


 


Glucose  157 H  


 


POC Glucose   158 H  170 H


 


Calcium  6.7 L  


 


Phosphorus  7.00 H  


 


Total Bilirubin  2.10 H  


 


AST  2625 H  


 


ALT  1547 H  


 


Troponin T   


 


NT-Pro-B Natriuret Pep   


 


Total Protein  6.2 L  


 


Albumin  3.3 L  


 


LDL Cholesterol Direct   


 


Urine Creatinine   


 


Urine Total Protein   














  05/21/20 05/22/20 05/22/20





  18:26 00:26 04:30


 


WBC    12.3 H


 


Hct    43.6 H


 


MCV    104 H


 


MCH    33 H


 


RDW    20.4 H


 


Plt Count    44 L


 


Lymph % (Auto)   


 


Lymph #   


 


Seg Neutrophils %   


 


Seg Neuts % (Manual)    89.0 H


 


Lymphocytes % (Manual)    7.0 L


 


Nucleated RBC %   


 


Seg Neutrophils #   


 


Seg Neutrophils # Man    10.9 H


 


Lymphocytes # (Manual)    0.9 L


 


PT   


 


INR   


 


Heparin Anti-Xa Level   


 


ABG pH   


 


ABG pO2   


 


ABG HCO3   


 


ABG O2 Saturation   


 


Oxyhemoglobin   


 


Sodium   


 


Potassium   


 


Chloride   


 


Carbon Dioxide   


 


BUN   


 


Creatinine   


 


Glucose   


 


POC Glucose  172 H  171 H 


 


Calcium   


 


Phosphorus   


 


Total Bilirubin   


 


AST   


 


ALT   


 


Troponin T   


 


NT-Pro-B Natriuret Pep   


 


Total Protein   


 


Albumin   


 


LDL Cholesterol Direct   


 


Urine Creatinine   


 


Urine Total Protein   














  05/22/20 05/22/20 05/22/20





  04:30 06:16 15:45


 


WBC   


 


Hct   


 


MCV   


 


MCH   


 


RDW   


 


Plt Count   


 


Lymph % (Auto)   


 


Lymph #   


 


Seg Neutrophils %   


 


Seg Neuts % (Manual)   


 


Lymphocytes % (Manual)   


 


Nucleated RBC %   


 


Seg Neutrophils #   


 


Seg Neutrophils # Man   


 


Lymphocytes # (Manual)   


 


PT   


 


INR   


 


Heparin Anti-Xa Level   


 


ABG pH    7.310 L


 


ABG pO2    73.7 L


 


ABG HCO3    27.2 H


 


ABG O2 Saturation    94.2 L


 


Oxyhemoglobin    92.1 L


 


Sodium  132 L  


 


Potassium   


 


Chloride  88.4 L  


 


Carbon Dioxide   


 


BUN  61 H  


 


Creatinine  2.9 H  


 


Glucose  168 H  


 


POC Glucose   179 H 


 


Calcium  6.9 L  


 


Phosphorus   


 


Total Bilirubin  1.80 H  


 


AST  967 H  


 


ALT  1094 H  


 


Troponin T   


 


NT-Pro-B Natriuret Pep   


 


Total Protein  6.1 L  


 


Albumin  2.9 L  


 


LDL Cholesterol Direct   


 


Urine Creatinine   


 


Urine Total Protein   














  05/23/20 05/23/20 05/23/20





  00:26 03:30 05:15


 


WBC   


 


Hct    43.9 H


 


MCV    105 H


 


MCH    34 H


 


RDW    20.2 H


 


Plt Count    30 L


 


Lymph % (Auto)    5.2 L


 


Lymph #    0.5 L


 


Seg Neutrophils %    89.7 H


 


Seg Neuts % (Manual)   


 


Lymphocytes % (Manual)   


 


Nucleated RBC %   


 


Seg Neutrophils #    9.4 H


 


Seg Neutrophils # Man   


 


Lymphocytes # (Manual)   


 


PT   


 


INR   


 


Heparin Anti-Xa Level   


 


ABG pH   7.317 L 


 


ABG pO2   73.4 L 


 


ABG HCO3   27.6 H 


 


ABG O2 Saturation   94.3 L 


 


Oxyhemoglobin   92.3 L 


 


Sodium   


 


Potassium   


 


Chloride   


 


Carbon Dioxide   


 


BUN   


 


Creatinine   


 


Glucose   


 


POC Glucose  204 H  


 


Calcium   


 


Phosphorus   


 


Total Bilirubin   


 


AST   


 


ALT   


 


Troponin T   


 


NT-Pro-B Natriuret Pep   


 


Total Protein   


 


Albumin   


 


LDL Cholesterol Direct   


 


Urine Creatinine   


 


Urine Total Protein   














  05/23/20





  05:15


 


WBC 


 


Hct 


 


MCV 


 


MCH 


 


RDW 


 


Plt Count 


 


Lymph % (Auto) 


 


Lymph # 


 


Seg Neutrophils % 


 


Seg Neuts % (Manual) 


 


Lymphocytes % (Manual) 


 


Nucleated RBC % 


 


Seg Neutrophils # 


 


Seg Neutrophils # Man 


 


Lymphocytes # (Manual) 


 


PT 


 


INR 


 


Heparin Anti-Xa Level 


 


ABG pH 


 


ABG pO2 


 


ABG HCO3 


 


ABG O2 Saturation 


 


Oxyhemoglobin 


 


Sodium  136 L


 


Potassium 


 


Chloride  93.1 L


 


Carbon Dioxide 


 


BUN  65 H


 


Creatinine  2.3 H


 


Glucose  148 H


 


POC Glucose 


 


Calcium  7.5 L


 


Phosphorus 


 


Total Bilirubin  1.80 H


 


AST  322 H


 


ALT  723 H


 


Troponin T 


 


NT-Pro-B Natriuret Pep 


 


Total Protein  6.0 L


 


Albumin  2.9 L


 


LDL Cholesterol Direct 


 


Urine Creatinine 


 


Urine Total Protein 











Chest x-ray: image reviewed

## 2020-05-23 NOTE — PROGRESS NOTE
Assessment and Plan


Assessment and plan: 


5/23/2020; patient remains on continuous BiPAP


Receiving tube feeding


Transaminases trending down


Renal function slightly improved





--Acute hypoxic respiratory failure; on continuous BiPAP


Titrate O2 sats to more than 90%, nebulizers


IV steroids, pulmonary following,





--Hypotension shock/on vasopressors;


Off vasopressors, reasonable blood pressures


Closely monitor





--Severe pulmonary hypertension; on echo


Continue current management, pulmonary following





--Cor pulmonale/severe pulmonary hypertension;


Management per pulmonary





--Acute exacerbation of COPD; on home oxygen


Oxygen, nebulizers, IV steroids, pulmonary following





--Acute on chronic systolic congestive heart failure; EF 20 to 25%


Continue heart failure medications, input output monitoring


Low-sodium diet, fluid restriction, cardiology following





--Atrial flutter with variable conduction;


On Cardizem and sotalol and heparin drip


Sotalol discontinued due to bradycardia by cardiology





--Acute transaminitis; shock liver /to liver congestion


Due to cardiomyopathy.  And hypotension 


Trending down





--Acute kidney injury; vasomotor nephropathy


Management per nephrology, avoid nephrotoxins





--Hyponatremia; mild improvement


Secondary to fluid overload, current management


Diuretic therapy, follow electrolytes, nephrology following





--Symptomatic hypoglycemia; [5/19/2020]


Received D50, D10 IV fluids, significantly improved


Closely monitor blood sugars adjust as needed





--Metabolic encephalopathy;lethergy


Due to underlying disease process


Treat the underlying cause, supportive care





--Generalized anasarca/edema


Due to cardiomyopathy, severe pulmonary hypertension


renal failure.  Treat the underlying cause





--Morbid obesity; BMI 38.6


Partly due to fluid overload


Continue supportive care





--DVT prophylaxis;


Patient is on heparin drip





--Full CODE STATUS.





Patient has multiple medical problems


Critically ill, poor prognosis





Plan of care reviewed with the patient'S nurse


And intensivist


Tube feeding per protocol





Critical care time 34 minutes














History


Interval history: 


Patient seen and examined at the bedside in [facility]


Patient's chart and medications reviewed


Remains on continuous BiPAP, lethargic


Receiving tube feeding


No new overnight events reported by the nursing


Vital signs noted








Hospitalist Physical





- Constitutional


Vitals: 


                                        











Temp Pulse Resp BP Pulse Ox


 


 97.5 F L  91 H  28 H  123/66   96 


 


 05/23/20 08:00  05/23/20 07:56  05/23/20 07:56  05/23/20 07:56  05/23/20 07:56











General appearance: Present: severe distress, well-nourished, obese (Morbidly 

obese), other (On high flow oxygen, lethergic)





- EENT


Eyes: Present: PERRL, EOM intact





- Neck


Neck: Present: supple, normal ROM





- Respiratory


Respiratory effort: normal


Respiratory: bilateral: diminished, rhonchi, negative: rales, wheezing





- Cardiovascular


Rhythm: regular


Heart Sounds: Present: S1 & S2





- Extremities


Extremities: no ischemia, No edema





- Integumentary


Integumentary: Present: clear, warm





- Psychiatric


Psychiatric: other (Lethargic)





- Neurologic


Neurologic: moves all extremities





HEART Score





- HEART Score


EKG: Non-specific


Age: > 65


Risk factors: > 3 risk factors or hx of atherosclerotic disease


Troponin: 


                                        











Troponin T  0.126 ng/mL (0.00-0.029)  H*  05/17/20  05:29    











Troponin: < normal limit





- Critical Actions


Critical Actions: 4-6 pts:12-16.6% risk of adverse cardiac event. Should be 

admitted





Results





- Labs


CBC & Chem 7: 


                                 05/23/20 05:15





                                 05/23/20 05:15


Labs: 


                             Laboratory Last Values











WBC  10.5 K/mm3 (4.5-11.0)   05/23/20  05:15    


 


RBC  4.16 M/mm3 (3.65-5.03)   05/23/20  05:15    


 


Hgb  14.3 gm/dl (10.1-14.3)   05/23/20  05:15    


 


Hct  43.9 % (30.3-42.9)  H  05/23/20  05:15    


 


MCV  105 fl (79-97)  H  05/23/20  05:15    


 


MCH  34 pg (28-32)  H  05/23/20  05:15    


 


MCHC  33 % (30-34)   05/23/20  05:15    


 


RDW  20.2 % (13.2-15.2)  H  05/23/20  05:15    


 


Plt Count  30 K/mm3 (140-440)  L  05/23/20  05:15    


 


Lymph % (Auto)  5.2 % (13.4-35.0)  L  05/23/20  05:15    


 


Mono % (Auto)  5.0 % (0.0-7.3)   05/23/20  05:15    


 


Eos % (Auto)  0.0 % (0.0-4.3)   05/23/20  05:15    


 


Baso % (Auto)  0.1 % (0.0-1.8)   05/23/20  05:15    


 


Lymph #  0.5 K/mm3 (1.2-5.4)  L  05/23/20  05:15    


 


Mono #  0.5 K/mm3 (0.0-0.8)   05/23/20  05:15    


 


Eos #  0.0 K/mm3 (0.0-0.4)   05/23/20  05:15    


 


Baso #  0.0 K/mm3 (0.0-0.1)   05/23/20  05:15    


 


Add Manual Diff  Complete   05/22/20  04:30    


 


Total Counted  100   05/22/20  04:30    


 


Seg Neutrophils %  89.7 % (40.0-70.0)  H  05/23/20  05:15    


 


Seg Neuts % (Manual)  89.0 % (40.0-70.0)  H  05/22/20  04:30    


 


Band Neutrophils %  1.0 %  05/22/20  04:30    


 


Lymphocytes % (Manual)  7.0 % (13.4-35.0)  L  05/22/20  04:30    


 


Reactive Lymphs % (Man)  0 %  05/22/20  04:30    


 


Monocytes % (Manual)  3.0 % (0.0-7.3)   05/22/20  04:30    


 


Eosinophils % (Manual)  0 % (0.0-4.3)   05/22/20  04:30    


 


Basophils % (Manual)  0 % (0.0-1.8)   05/22/20  04:30    


 


Metamyelocytes %  0 %  05/22/20  04:30    


 


Myelocytes %  0 %  05/22/20  04:30    


 


Promyelocytes %  0 %  05/22/20  04:30    


 


Blast Cells %  0 %  05/22/20  04:30    


 


Nucleated RBC %  Not Reportable   05/22/20  04:30    


 


Seg Neutrophils #  9.4 K/mm3 (1.8-7.7)  H  05/23/20  05:15    


 


Seg Neutrophils # Man  10.9 K/mm3 (1.8-7.7)  H  05/22/20  04:30    


 


Band Neutrophils #  0.1 K/mm3  05/22/20  04:30    


 


Lymphocytes # (Manual)  0.9 K/mm3 (1.2-5.4)  L  05/22/20  04:30    


 


Abs React Lymphs (Man)  0.0 K/mm3  05/22/20  04:30    


 


Monocytes # (Manual)  0.4 K/mm3 (0.0-0.8)   05/22/20  04:30    


 


Eosinophils # (Manual)  0.0 K/mm3 (0.0-0.4)   05/22/20  04:30    


 


Basophils # (Manual)  0.0 K/mm3 (0.0-0.1)   05/22/20  04:30    


 


Metamyelocytes #  0.0 K/mm3  05/22/20  04:30    


 


Myelocytes #  0.0 K/mm3  05/22/20  04:30    


 


Promyelocytes #  0.0 K/mm3  05/22/20  04:30    


 


Blast Cells #  0.0 K/mm3  05/22/20  04:30    


 


WBC Morphology  Not Reportable   05/22/20  04:30    


 


Hypersegmented Neuts  Not Reportable   05/22/20  04:30    


 


Hyposegmented Neuts  Not Reportable   05/22/20  04:30    


 


Hypogranular Neuts  Not Reportable   05/22/20  04:30    


 


Smudge Cells  Not Reportable   05/22/20  04:30    


 


Toxic Granulation  Not Reportable   05/22/20  04:30    


 


Toxic Vacuolation  Not Reportable   05/22/20  04:30    


 


Dohle Bodies  Not Reportable   05/22/20  04:30    


 


Pelger-Huet Anomaly  Not Reportable   05/22/20  04:30    


 


Chitra Rods  Not Reportable   05/22/20  04:30    


 


Platelet Estimate  Consistent w auto   05/22/20  04:30    


 


Clumped Platelets  Not Reportable   05/22/20  04:30    


 


Plt Clumps, EDTA  Not Reportable   05/22/20  04:30    


 


Large Platelets  Not Reportable   05/22/20  04:30    


 


Giant Platelets  Not Reportable   05/22/20  04:30    


 


Platelet Satelliting  Not Reportable   05/22/20  04:30    


 


Plt Morphology Comment  Not Reportable   05/22/20  04:30    


 


RBC Morphology  Not Reportable   05/22/20  04:30    


 


Dimorphic RBCs  Not Reportable   05/22/20  04:30    


 


Polychromasia  Not Reportable   05/22/20  04:30    


 


Hypochromasia  Not Reportable   05/22/20  04:30    


 


Poikilocytosis  Not Reportable   05/22/20  04:30    


 


Anisocytosis  1+   05/22/20  04:30    


 


Microcytosis  Not Reportable   05/22/20  04:30    


 


Macrocytosis  1+   05/22/20  04:30    


 


Spherocytes  Few   05/22/20  04:30    


 


Pappenheimer Bodies  Not Reportable   05/22/20  04:30    


 


Sickle Cells  Not Reportable   05/22/20  04:30    


 


Target Cells  Not Reportable   05/22/20  04:30    


 


Tear Drop Cells  Not Reportable   05/22/20  04:30    


 


Ovalocytes  Few   05/22/20  04:30    


 


Helmet Cells  Not Reportable   05/22/20  04:30    


 


Yeager-Theodosia Bodies  Not Reportable   05/22/20  04:30    


 


Cabot Rings  Not Reportable   05/22/20  04:30    


 


[place] Cells  Few   05/22/20  04:30    


 


Bite Cells  Not Reportable   05/22/20  04:30    


 


Crenated Cell  Not Reportable   05/22/20  04:30    


 


Elliptocytes  Not Reportable   05/22/20  04:30    


 


Acanthocytes (Spur)  Not Reportable   05/22/20  04:30    


 


Rouleaux  Not Reportable   05/22/20  04:30    


 


Hemoglobin C Crystals  Not Reportable   05/22/20  04:30    


 


Schistocytes  Not Reportable   05/22/20  04:30    


 


Malaria parasites  Not Reportable   05/22/20  04:30    


 


Wes Bodies  Not Reportable   05/22/20  04:30    


 


Hem Pathologist Commnt  No   05/22/20  04:30    


 


PT  15.3 Sec. (12.2-14.9)  H  05/17/20  03:25    


 


INR  1.20  (0.87-1.13)  H  05/17/20  03:25    


 


APTT  29.0 Sec. (24.2-36.6)   05/17/20  03:25    


 


Heparin Anti-Xa Level  0.12 U.I./ml (0.3-0.7)  L  05/20/20  Unknown


 


ABG pH  7.317 pH Units (7.350-7.450)  L  05/23/20  03:30    


 


ABG pCO2  55.1 mm Hg  05/23/20  03:30    


 


ABG pO2  73.4 mm Hg (80.0-90.0)  L  05/23/20  03:30    


 


ABG HCO3  27.6 mmol/L (20.0-26.0)  H  05/23/20  03:30    


 


ABG O2 Saturation  94.3 % (95.0-99.0)  L  05/23/20  03:30    


 


ABG O2 Content  18.2  (0.0-44)   05/23/20  03:30    


 


ABG Base Excess  0.4 mmol/L (-2.0-3.0)   05/23/20  03:30    


 


ABG Hemoglobin  14.0 gm/dl (12.0-16.0)   05/23/20  03:30    


 


ABG Carboxyhemoglobin  1.7 % (0.0-5.0)   05/23/20  03:30    


 


ABG Methemoglobin  0.5 % (0.0-1.5)   05/23/20  03:30    


 


Oxyhemoglobin  92.3 % (95.0-99.0)  L  05/23/20  03:30    


 


FiO2  60 %  05/23/20  03:30    


 


Sodium  136 mmol/L (137-145)  L  05/23/20  05:15    


 


Potassium  3.8 mmol/L (3.6-5.0)   05/23/20  05:15    


 


Chloride  93.1 mmol/L ()  L  05/23/20  05:15    


 


Carbon Dioxide  28 mmol/L (22-30)   05/23/20  05:15    


 


Anion Gap  19 mmol/L  05/23/20  05:15    


 


BUN  65 mg/dL (7-17)  H  05/23/20  05:15    


 


Creatinine  2.3 mg/dL (0.7-1.2)  H  05/23/20  05:15    


 


Estimated GFR  25 ml/min  05/23/20  05:15    


 


BUN/Creatinine Ratio  28 %  05/23/20  05:15    


 


Glucose  148 mg/dL ()  H  05/23/20  05:15    


 


POC Glucose  204  ()  H  05/23/20  00:26    


 


Calcium  7.5 mg/dL (8.4-10.2)  L  05/23/20  05:15    


 


Phosphorus  7.00 mg/dL (2.5-4.5)  H  05/21/20  05:35    


 


Magnesium  1.80 mg/dL (1.7-2.3)   05/23/20  05:15    


 


Total Bilirubin  1.80 mg/dL (0.1-1.2)  H  05/23/20  05:15    


 


AST  322 units/L (5-40)  H  05/23/20  05:15    


 


ALT  723 units/L (7-56)  H  05/23/20  05:15    


 


Alkaline Phosphatase  101 units/L ()   05/23/20  05:15    


 


Total Creatine Kinase  112 units/L ()   05/18/20  16:23    


 


CK-MB (CK-2)  3.5 ng/mL (0.0-4.0)   05/18/20  16:23    


 


Troponin T  0.126 ng/mL (0.00-0.029)  H*  05/17/20  05:29    


 


NT-Pro-B Natriuret Pep  3719 pg/mL (0-900)  H  05/16/20  22:13    


 


Total Protein  6.0 g/dL (6.3-8.2)  L  05/23/20  05:15    


 


Albumin  2.9 g/dL (3.9-5)  L  05/23/20  05:15    


 


Albumin/Globulin Ratio  0.9 %  05/23/20  05:15    


 


Triglycerides  68 mg/dL (2-149)   05/17/20  00:12    


 


Cholesterol  83 mg/dL ()   05/17/20  00:12    


 


LDL Cholesterol Direct  33 mg/dL ()  L  05/17/20  00:12    


 


HDL Cholesterol  49 mg/dL (40-59)   05/17/20  00:12    


 


Cholesterol/HDL Ratio  1.69 %  05/17/20  00:12    


 


Urine Color  Mandi  (Yellow)   05/19/20  Unknown


 


Urine Turbidity  Slightly-cloudy  (Clear)   05/19/20  Unknown


 


Urine pH  5.0  (5.0-7.0)   05/19/20  Unknown


 


Ur Specific Gravity  1.014  (1.003-1.030)   05/19/20  Unknown


 


Urine Protein  30 mg/dl mg/dL (Negative)   05/19/20  Unknown


 


Urine Glucose (UA)  Neg mg/dL (Negative)   05/19/20  Unknown


 


Urine Ketones  Neg mg/dL (Negative)   05/19/20  Unknown


 


Urine Blood  Neg  (Negative)   05/19/20  Unknown


 


Urine Nitrite  Neg  (Negative)   05/19/20  Unknown


 


Urine Bilirubin  Neg  (Negative)   05/19/20  Unknown


 


Urine Urobilinogen  2.0 mg/dL (<2.0)   05/19/20  Unknown


 


Ur Leukocyte Esterase  Sm  (Negative)   05/19/20  Unknown


 


Urine WBC (Auto)  2.0 /HPF (0.0-6.0)   05/19/20  Unknown


 


Urine RBC (Auto)  2.0 /HPF (0.0-6.0)   05/19/20  Unknown


 


U Epithel Cells (Auto)  2.0 /HPF (0-13.0)   05/19/20  Unknown


 


Urine Bacteria (Auto)  1+ /HPF (Negative)   05/19/20  Unknown


 


Urine Osmolality  319 Mosm/kg  05/19/20  Unknown


 


Urine Creatinine  216.1 mg/dL (0.1-20.0)  H  05/19/20  Unknown


 


Protein/Creatinin Ratio  0.29   05/19/20  Unknown


 


Urine Sodium  10 mmol/L  05/19/20  Unknown


 


Urine Total Protein  62 mg/dL (5-11.8)  H  05/19/20  Unknown














- Diagnostic Impressions


Diagnostic Impressions: 








Echocardiogram  05/16/20 23:50


Transthoracic Echocardiogram


 


Indication:


CHF


BP:           84/59


HR:           117


 


Conclusions


 *4-chamber dilated cardiomyopathy.


 *The right heart chambers are both severely dilated. There is


moderately severe tricuspid regurgitation, and severe pulmonary HTN,


with PASP of 82mmHg.


 *Left heart chambers are moderately dilated.


 *Global left ventricular systolic function is severely decreased.


 *The estimated ejection fraction is 20-25%. 


 *There is mild mitral regurgitation. 


 


Findings     


Left Ventricle:


The left ventricular chamber size is moderately dilated. There is no


left ventricular hypertrophy. Severe global hypokinesis of the left


ventricle is observed. Global left ventricular systolic function is


severely decreased. The estimated ejection fraction is 20-25%.  Abnormal


left ventricular diastolic function is observed. 


 


Left Atrium:


The left atrium is moderate to severely dilated.  


 


Right Ventricle:


The right ventricle is severely dilated.  The right ventricular global


systolic function is severely reduced. 


 


Right Atrium:


The right atrial cavity size is severely dilated. 


 


Aortic Valve:


The aortic valve leaflets are moderately thickened. There is trace of


aortic regurgitation. There is no evidence of aortic stenosis. 


 


Mitral Valve:


The mitral valve leaflets are moderately thickened. There is mild mitral


regurgitation.  There is no evidence of mitral stenosis. 


 


Tricuspid Valve:


The tricuspid valve leaflets are normal.  There is moderate to severe


tricuspid regurgitation. The right ventricular systolic pressure is


calculated at 82 mmHg.  There is evidence of severe pulmonary


hypertension. There is no tricuspid stenosis. 


 


Pulmonic Valve:


The pulmonic valve appears normal. There is mild pulmonic regurgitation.


 


 


Pericardium:


A trivial pericardial effusion is visualized. 


 


Aorta:


There is no dilatation of the ascending aorta. There is no dilatation of


the aortic root. 


 


Venous:


The inferior vena cava is dilated.  There is less than 50% respiratory


change in the inferior vena cava dimension. 


 


Measurements     


Chambers 2D


Name                    Value         Normal Range            


IVSd (2D)               1.56 cm       (0.6 - 1.1)             


LVPWd (2D)              1.56 cm       (0.6 - 1.1)             


LVIDd (2D)              5.05 cm       (3.7 - 5.6)             


LVIDs (2D)              4.47 cm       (2 - 3.8)               


LV FS (2D)              11.64 %       -                        


EF Teichholz (2D)       25.1 %        -                        


Ao root diameter (2D)   3.06 cm       (2 - 3.7)               


 


Volumes/Mass


Name                    Value         Normal Range            


LA ESV SP 4CH (A/L)     76.11 ml      -                        


LA ESV SP 2CH (A/L)     48.94 ml      -                        


LA ESV BP (A/L)         62.38 ml      -                        


LA ESV SP 4CH (MOD)     70.31 ml      -                        


LA ESV SP 2CH (MOD)     46.04 ml      -                        


LA ESV BP (MOD)         58.03 ml      -                        


LA ESV BP (MOD) index   28.87 ml/m2   -                        


LV EDV SP 4CH (MOD)     81.51 ml      -                        


LV ESV SP 4CH (MOD)     46.31 ml      -                        


EF SP 4CH (MOD)         43.18 %       -                        


LV EDV SP 2CH (MOD)     84.04 ml      -                        


LV ESV SP 2CH (MOD)     60.72 ml      -                        


EF SP 2CH (MOD)         27.76 %       -                        


LV EDV BP               84.52 ml      -                        


LV ESV BP               53.64 ml      -                        


BP EF (MOD)             36.54 %       -                        


 


Aortic Valve


Name                    Value         Normal Range            


LVOT diameter           2.01 cm       -                        


LVOT Vmax               0.64 m/sec    -                        


LVOT VTI                8.01 cm       -                        


LVOT peak gradient      1.62 mmHg     -                        


LVOT mean gradient      0.79 mmHg     -                        


SV LVOT                 25.42 ml      -                        


Ascending Ao            2.81 cm       -                        


 


Tricuspid Valve


Name                    Value         Normal Range            


TR Vmax                 4.09 m/sec    -                        


TR peak gradient        66.78 mmHg    -                        


RAP                     15 mmHg       -                        


RVSP                    82 mmHg       -                        


 


Pulmonic Valve/Qp:Qs


Name                    Value         Normal Range            


PV Vmax                 0.87 m/sec    -                        


PV peak gradient        3.06 mmHg     -                        


WA end-diastolic Vmax   2.66 m/sec    -                        


RVOT Vmax               0.7 m/sec     -                        


RVOT peak gradient      1.96 mmHg     -                        


PV acceleration time    53.28 msec    -                        


 


Electronically signed by: Shade Vela MD on 05/17/2020 15:34:04











Min/IV: 


                                        





Voiding Method                   External Female Catheter


IV Catheter Type [Right          Triple Lumen Cath


Femoral]                         


IV Catheter Type [Right Foot]    INT / Saline Lock


IV Catheter Type [Right          INT / Saline Lock


Forearm]                         


IV Catheter Type [Left Wrist]    INT / Saline Lock


IV Catheter Type [Left Forearm   INT / Saline Lock


]                                











Active Medications





- Current Medications


Current Medications: 














Generic Name Dose Route Start Last Admin





  Trade Name Freq  PRN Reason Stop Dose Admin


 


Acetaminophen  650 mg  05/16/20 23:45 





  Tylenol  PO  





  Q4H PRN  





  Pain MILD(1-3)/Fever >100.5/HA  


 


Albuterol  2.5 mg  05/17/20 14:57 





  Proventil  IH  





  Q4HRT PRN  





  Shortness Of Breath  


 


Albuterol/Ipratropium  1 ampul  05/17/20 20:00  05/23/20 07:56





  Duoneb *Not For Prn Use*  IH   1 ampul





  TIDRT AMANDA   Administration


 


Lipase/Protease/Amylase  1 each  05/20/20 15:33 





  Pancrewillie Cloud 10,500 Unit  FEEDTUBE  





  PRN PRN  





  For Clogged Feeding Tube  


 


Arformoterol Tartrate  15 mcg  05/17/20 20:00  05/23/20 07:56





  Brovana Nebu  IH   15 mcg





  Q12HRT AMANDA   Administration


 


Aspirin  325 mg  05/17/20 10:00  05/23/20 10:01





  Ecotrin  PO   325 mg





  QDAY AMANDA   Administration


 


Atorvastatin Calcium  40 mg  05/17/20 22:00  05/22/20 23:40





  Lipitor  PO   40 mg





  QHS AMANDA   Administration


 


Budesonide  0.5 mg  05/17/20 20:00  05/23/20 07:56





  Pulmicort  IH   0.5 mg





  Q12HRT AMANDA   Administration


 


Dextrose  50 ml  05/20/20 17:44 





  D50w (25gm) Syringe  IV  





  Q30MIN PRN  





  Hypoglycemia  





  Protocol  


 


Diltiazem HCl  30 mg  05/17/20 18:00  05/23/20 05:29





  Cardizem  PO   Not Given





  Q6HR AMANDA  


 


Famotidine  20 mg  05/21/20 10:00  05/23/20 10:01





  Pepcid  IV   20 mg





  QDAY AMANDA   Administration


 


Dextrose/Sodium Chloride  1,000 mls @ 75 mls/hr  05/20/20 18:00  05/23/20 05:29





  D5ns  IV   75 mls/hr





  AS DIRECT AMANDA   Administration


 


Norepinephrine  4 mg in 250 mls @ 7.5 mls/hr  05/20/20 18:00  05/21/20 16:00





  Levophed Drip 4 Mg/Ns 250 Ml  IV   0 mcg/min





  TITR AMANDA   0 mls/hr





    Titration





  Protocol  





  2 MCG/MIN  


 


Vasopressin 20 unit/ Sodium  101 mls @ 9.09 mls/hr  05/20/20 18:00  05/20/20 

18:30





  Chloride  IV   0 units/min





  TITR AMANDA   0 mls/hr





    Titration





  Protocol  





  0.03 UNITS/MIN  


 


Magnesium Hydroxide  30 ml  05/16/20 23:45 





  Milk Of Magnesia  PO  





  Q4H PRN  





  Constipation  


 


Methylprednisolone Sodium Succinate  40 mg  05/21/20 10:00  05/23/20 10:01





  Solu-Medrol  IV   40 mg





  Q12HR AMANDA   Administration


 


Morphine Sulfate  2 mg  05/16/20 23:45 





  Morphine  IV  





  Q5MIN PRN  





  Chest Pain unrelieved by NTG  


 


Nitroglycerin  0.4 mg  05/16/20 23:45 





  Nitrostat  SL  





  .Q5MIN PRN  





  Chest Pain  


 


Ondansetron HCl  4 mg  05/16/20 23:45 





  Zofran  IV  





  Q8H PRN  





  Nausea And Vomiting  


 


Simple Syrup  15 ml  05/20/20 15:33 





  Simple Syrup  FEEDTUBE  





  PRN PRN  





  Hypoglycemia  


 


Simple Syrup  30 ml  05/20/20 15:33 





  Simple Syrup  FEEDTUBE  





  PRN PRN  





  Hypoglycemia  


 


Sodium Bicarbonate  325 mg  05/20/20 15:33 





  Sodium Bicarbonate  FEEDTUBE  





  PRN PRN  





  For Clogged Feeding Tube  


 


Sodium Chloride  10 ml  05/17/20 10:00  05/23/20 10:02





  Sodium Chloride Flush Syringe 10 Ml  IV   10 ml





  BID AMANDA   Administration


 


Sodium Chloride  10 ml  05/16/20 23:45 





  Sodium Chloride Flush Syringe 10 Ml  IV  





  PRN PRN  





  LINE FLUSH  














Nutrition/Malnutrition Assess





- Dietary Evaluation


Nutrition/Malnutrition Findings: 


                                        





Nutrition Notes                                            Start:  05/20/20 

15:23


Freq:                                                      Status: Active       




Protocol:                                                                       




 Document     05/22/20 08:16  LP  (Rec: 05/22/20 08:21  LP  [id number])


 Nutrition Notes


     Initial or Follow up                        Reassessment


     Current Diagnosis                           Acute Kidney Injury,COPD,


                                                 Hypertension,Heart Failure


     Other Pertinent Diagnosis                   chest pain, volume overload


     Current Diet                                Nepro at 30ml/hr


     Labs/Tests                                  Na 132


     Pertinent Medications                       Solumedrol


                                                 D5 NS at 75ml/hr


     Height                                      5 ft


     Weight                                      89.7 kg


     Ideal Body Weight (kg)                      45.45


     BMI                                         38.6


     Weight Status                               Obese


     Subjective/Other Information                Consult for TF but TF was


                                                 already ordered.


     Burn                                        Absent


     Trauma                                      Absent


     Minimum of two criteria                     No


     Fluid Accumulation                          Moderate to Severe (severe)


     #1


      Nutrition Diagnosis                        Inadequate oral intake


      Etiology                                   SOB


      As Evidenced by Signs and Symptoms         Pt unable to consume estimated


                                                 energy needs via PO


      Diagnosis Progress(for reassessment        Continues


       documentation)                            


     Is patient on ventilator?                   No


     Is Patient Ambulatory and/or Out of Bed     No


     REE-([facility]-confined to bed)      1611.840


     Kcal/Kg value to use for calculation        14


     Approximate Energy Requirements Using       [id number]


      kcal/Kg                                    


     Calculation Used for Recommendations        Kcal/kg


     Additional Notes                            Protein: 54-82g (0.8-1.2g/kg


                                                 using AdjBW 68kg)


                                                 Fluid 1ml/kcal or per MD


 Nutrition Intervention


     Change Diet Order:                          TF


     Nutrition Support:                          Nepro 1.8 at 30ml/hr


                                                 Flush 100ml q4h


     Kcal                                        1,296


     Protein (gm)                                58


     Fluid (mL)                                  523


     Goal #1                                     Meet at least 80% of kcal and


                                                 protein needs


     Anticipated Discharge Needs:                Unable to determine at this


                                                 time


     Follow-Up By:                               05/25/20


     Additional Comments                         Follow for TF start/tolerance

## 2020-05-23 NOTE — XRAY REPORT
ABDOMEN 1 VIEW(S) 5/23/2020 5:06 PM



INDICATION / CLINICAL INFORMATION:

NGT position. 



COMPARISON:

None available.



FINDINGS:

The tip and sidehole of the esophagogastric suction tube are below the diaphragm in the proximal stom
ach. 



Signer Name: Hayes Moreno MD 

Signed: 5/23/2020 6:34 PM

Workstation Name: [id number]

## 2020-05-24 LAB
ALBUMIN SERPL-MCNC: 2.7 G/DL (ref 3.9–5)
ALT SERPL-CCNC: 514 UNITS/L (ref 7–56)
BUN SERPL-MCNC: 73 MG/DL (ref 7–17)
BUN/CREAT SERPL: 33 %
CALCIUM SERPL-MCNC: 8.2 MG/DL (ref 8.4–10.2)
HEMOLYSIS INDEX: 76

## 2020-05-24 PROCEDURE — 5A09357 ASSISTANCE WITH RESPIRATORY VENTILATION, LESS THAN 24 CONSECUTIVE HOURS, CONTINUOUS POSITIVE AIRWAY PRESSURE: ICD-10-PCS | Performed by: INTERNAL MEDICINE

## 2020-05-24 RX ADMIN — IPRATROPIUM BROMIDE AND ALBUTEROL SULFATE SCH: .5; 3 SOLUTION RESPIRATORY (INHALATION) at 15:25

## 2020-05-24 RX ADMIN — BUDESONIDE SCH MG: 0.5 INHALANT RESPIRATORY (INHALATION) at 20:59

## 2020-05-24 RX ADMIN — METHYLPREDNISOLONE SODIUM SUCCINATE SCH MG: 40 INJECTION, POWDER, FOR SOLUTION INTRAMUSCULAR; INTRAVENOUS at 22:40

## 2020-05-24 RX ADMIN — IPRATROPIUM BROMIDE AND ALBUTEROL SULFATE SCH AMPUL: .5; 3 SOLUTION RESPIRATORY (INHALATION) at 08:07

## 2020-05-24 RX ADMIN — METHYLPREDNISOLONE SODIUM SUCCINATE SCH MG: 40 INJECTION, POWDER, FOR SOLUTION INTRAMUSCULAR; INTRAVENOUS at 10:42

## 2020-05-24 RX ADMIN — Medication SCH ML: at 10:42

## 2020-05-24 RX ADMIN — FAMOTIDINE SCH MG: 10 INJECTION, SOLUTION INTRAVENOUS at 10:42

## 2020-05-24 RX ADMIN — METHYLPREDNISOLONE SODIUM SUCCINATE SCH MG: 40 INJECTION, POWDER, FOR SOLUTION INTRAMUSCULAR; INTRAVENOUS at 01:26

## 2020-05-24 RX ADMIN — BUDESONIDE SCH MG: 0.5 INHALANT RESPIRATORY (INHALATION) at 08:07

## 2020-05-24 RX ADMIN — Medication SCH ML: at 22:41

## 2020-05-24 RX ADMIN — ASPIRIN SCH MG: 325 TABLET, COATED ORAL at 10:42

## 2020-05-24 RX ADMIN — IPRATROPIUM BROMIDE AND ALBUTEROL SULFATE SCH AMPUL: .5; 3 SOLUTION RESPIRATORY (INHALATION) at 20:54

## 2020-05-24 RX ADMIN — ARFORMOTEROL TARTRATE SCH MCG: 15 SOLUTION RESPIRATORY (INHALATION) at 20:54

## 2020-05-24 RX ADMIN — ARFORMOTEROL TARTRATE SCH MCG: 15 SOLUTION RESPIRATORY (INHALATION) at 08:07

## 2020-05-24 NOTE — CAT SCAN REPORT
CT CHEST WITHOUT CONTRAST



INDICATION / CLINICAL INFORMATION:

right midlung infiltrate.



TECHNIQUE:

Axial CT images were obtained through the chest without contrast. All CT scans at this location are p
erformed using CT dose reduction for ALARA by means of automated exposure control. 



COMPARISON:

Chest CT 3/23/2016



FINDINGS:



HEART: Moderate cardiomegaly. The pulmonary arteries are dilated. Small volume of pericardial fluid b
ut no significant effusion.

THORACIC AORTA: No significant abnormality. 

MEDIASTINUM and SUSANNE: No significant abnormality.

LUNGS: The right lower lobe is completely atelectatic. Mild compressive atelectasis in involving port
ions of the left lower lobe and right upper lobe. Mild groundglass opacity in the aerated portions of
 the lungs. No consolidation.

PLEURA: Trace left and small right pleural fluid collections. No pneumothorax.



ADDITIONAL FINDINGS: Subcutaneous edema at the flanks.



UPPER ABDOMEN: Multiple cysts in the kidneys, which are incompletely imaged. Some of the cystic lesio
ns are hyperattenuating, suggesting proteinaceous or hemorrhagic content. This could be further evalu
ated with a renal protocol CT or MRI.



SKELETAL SYSTEM: No significant abnormality.



IMPRESSION:

1.  Small right pleural effusion. The right lower lobe is almost entirely atelectatic. 

2.  Mild groundglass opacities in the lungs may be due to pulmonary edema.

3.  Mild body wall edema.

4.  Moderate cardiomegaly. Dilated pulmonary arteries are suggestive of pulmonary hypertension.



Signer Name: Hayes Moreno MD 

Signed: 5/24/2020 8:54 PM

Workstation Name: [id number]

## 2020-05-24 NOTE — PROGRESS NOTE
Assessment and Plan





1.  Atrial flutter with variable AV block


2.  Chronic combined systolic and diastolic heart failure


3.  Dilated cardiomyopathy left ventricular ejection fraction 20 to 25%


4.  Acute exacerbation of COPD


5.  Chronic cor pulmonale with severe pulmonary hypertension


6.  Chronic kidney disease stage IV


7.  Hyponatremia





Plan.


Continue management as per pulmonary plan.  Cardiac wise patient is stable we 

will monitor fluid input and output closely.


Further cardiac work-up when more stable





Subjective


Date of service: 05/24/20


Principal diagnosis: Ac hypoxemic and hypercapnic resp failure; AE-COPD; NSTEMI;

MILAN; HFrEF


Interval history: 





No change in clinical status. No new cardiac symptoms.





Objective


                                   Vital Signs











  Temp Pulse Pulse Pulse Resp Resp BP


 


 05/24/20 08:53       


 


 05/24/20 08:23    100 H    20 


 


 05/24/20 08:00   89    27 H   102/64


 


 05/24/20 07:00   70    28 H   97/61


 


 05/24/20 06:00   73    28 H   83/51


 


 05/24/20 05:24   59 L      88/54


 


 05/24/20 05:00   58 L    16   99/65


 


 05/24/20 04:00  97.5 F L  93 H    28 H   99/65


 


 05/24/20 03:54     89  28 H  


 


 05/24/20 03:00   101 H    27 H   104/61


 


 05/24/20 02:00   98 H    27 H   95/56


 


 05/24/20 01:35   103 H    28 H   95/56


 


 05/24/20 01:00   97 H    16   116/72


 


 05/24/20 00:00   96 H   98 H  23   124/71


 


 05/23/20 23:58  97.4 F L      


 


 05/23/20 23:44   95 H    18   124/71


 


 05/23/20 23:00   97 H    19   120/67


 


 05/23/20 22:19    100 H    20 


 


 05/23/20 22:18       


 


 05/23/20 22:00   95 H    19   115/84


 


 05/23/20 21:00   94 H    24   101/70


 


 05/23/20 20:00  97.4 F L  89    24   121/67


 


 05/23/20 19:46     87  21  


 


 05/23/20 19:00   86    23   114/63


 


 05/23/20 18:07       


 


 05/23/20 18:00   98 H    16   102/78


 


 05/23/20 17:00   91 H    23   106/62


 


 05/23/20 16:00  97.6 F  94 H   94 H  24   128/83


 


 05/23/20 15:00   88    22   128/80


 


 05/23/20 14:56       


 


 05/23/20 14:17    95 H    20 


 


 05/23/20 14:00   92 H    19   129/73


 


 05/23/20 13:00   92 H    24   112/86


 


 05/23/20 12:39   96 H      112/86


 


 05/23/20 12:00  97.8 F  87   87  22   121/84


 


 05/23/20 11:46   79    21   127/81


 


 05/23/20 11:30   78    22   127/81


 


 05/23/20 11:16   86    25 H   127/86


 


 05/23/20 11:00   87    24   127/86


 


 05/23/20 10:50       


 


 05/23/20 10:46   94 H    21   120/76


 


 05/23/20 10:30   98 H    17   105/78


 


 05/23/20 10:16   92 H    28 H   105/78


 


 05/23/20 10:01     89  24  


 


 05/23/20 10:00   96 H    28 H   105/78














  Pulse Ox


 


 05/24/20 08:53  97


 


 05/24/20 08:23  97


 


 05/24/20 08:00  100


 


 05/24/20 07:00  100


 


 05/24/20 06:00 


 


 05/24/20 05:24 


 


 05/24/20 05:00 


 


 05/24/20 04:00  97


 


 05/24/20 03:54  98


 


 05/24/20 03:00  96


 


 05/24/20 02:00  95


 


 05/24/20 01:35  92


 


 05/24/20 01:00  87


 


 05/24/20 00:00  93


 


 05/23/20 23:58 


 


 05/23/20 23:44  95


 


 05/23/20 23:00  95


 


 05/23/20 22:19 


 


 05/23/20 22:18  95


 


 05/23/20 22:00  95


 


 05/23/20 21:00  94


 


 05/23/20 20:00  91


 


 05/23/20 19:46  95


 


 05/23/20 19:00  95


 


 05/23/20 18:07  95


 


 05/23/20 18:00  82 L


 


 05/23/20 17:00  93


 


 05/23/20 16:00  93


 


 05/23/20 15:00  94


 


 05/23/20 14:56  93


 


 05/23/20 14:17 


 


 05/23/20 14:00  92


 


 05/23/20 13:00  93


 


 05/23/20 12:39 


 


 05/23/20 12:00  93


 


 05/23/20 11:46  99


 


 05/23/20 11:30  99


 


 05/23/20 11:16  99


 


 05/23/20 11:00  95


 


 05/23/20 10:50  95


 


 05/23/20 10:46  88


 


 05/23/20 10:30  90


 


 05/23/20 10:16  91


 


 05/23/20 10:01  95


 


 05/23/20 10:00  89














- Physical Examination


General: No Apparent Distress


HEENT: Positive: PERRL


Neck: Positive: neck supple


Cardiac: Positive: Regular Rate, S1/S2, S3, PMI, Dilated, Laterally Displaced


Lungs: Positive: clear to auscultation, No Wheeze, Rales, Rhonchi


Neuro: Positive: Grossly Intact


Abdomen: Positive: Soft


Skin: Positive: Clear


Extremities: Present: +1 Edema





- Labs and Meds


                                 Cardiac Enzymes











  05/24/20 Range/Units





  03:15 


 


AST  159 H  (5-40)  units/L








                          Comprehensive Metabolic Panel











  05/24/20 Range/Units





  03:15 


 


Sodium  135 L  (137-145)  mmol/L


 


Potassium  4.6  D  (3.6-5.0)  mmol/L


 


Chloride  93.5 L  ()  mmol/L


 


Carbon Dioxide  28  (22-30)  mmol/L


 


BUN  73 H  (7-17)  mg/dL


 


Creatinine  2.2 H  (0.7-1.2)  mg/dL


 


Glucose  128 H  ()  mg/dL


 


Calcium  8.2 L  (8.4-10.2)  mg/dL


 


AST  159 H  (5-40)  units/L


 


ALT  514 H  (7-56)  units/L


 


Alkaline Phosphatase  85  ()  units/L


 


Total Protein  5.7 L  (6.3-8.2)  g/dL


 


Albumin  2.7 L  (3.9-5)  g/dL














- Allied health notes


Allied health notes reviewed: RT

## 2020-05-24 NOTE — PROGRESS NOTE
Assessment and Plan





This is a 70 year old woman with CHF, COPD who presents with shortness of 

breath, chest pain now with MILAN, hyponatremia





# Acute Kidney Injury:  creatinine 0.7->1.3->1.9->2.8->3.4->2.9->2.3->2.2 as of 

this AM.  Suspect this is related to tubular injury in setting of hypotension, 

with changes due to fluid shifting with diuretic therapy and cardiorenal 

physiology.  Off sotalol. Continue to be cautious with diuretic therapy and 

defer to pulm/cardiology to use prn based on respiratory status.  No indication 

for renal replacement therapy at this time, will follow closely given clinical 

status; is at high risk for requiring renal replacement therapy but stable with 

improved MAP.


- daily renal labs


- avoid nephrotoxins


- low salt diet


- strict Is/Os


- maintain MAP >70, continue pressors prn 


- supportive measures per ICU





# Azotemia: likely due to MILAN and steroid use; may have signs of uremia given 

confusion but no indication for renal replacement therapy with improving 

creatinine, reasonable urine output (1L)





# Hyponatremia: suspect hyponatremia related to volume overload, stable at 135. 

Ok to use furosemide for volume prn as noted above; furosemide should promote 

more water loss than sodium, but still need to monitor closely for worsening of 

hyponatremia





# Respiratory Acidosis: suspect related to underlying pulmonary disease; will 

worsen with excessive diuretic use, improved now





# NSTEMI/ CHF/ atrial flutter: appreciate cardiology input





# Shortness of Breath, Respiratory Distress, Pulmonary Hypertension, COPD: nicole

reciate pulmonary input





# Anemia of chronic disease 





# Transaminitis





Thank you for this interesting consult; we will continue to follow closely with 

a guarded renal prognosis.











Subjective


Date of service: 05/24/20


Principal diagnosis: Ac hypoxemic and hypercapnic resp failure; AE-COPD; NSTEMI;

MILAN; HFrEF


Interval history: 





Remains in ICU, now on HFNC, off pressors.  Remains altered this afternoon





Objective





- Exam


Narrative Exam: 





Constitutional: mild acute distress, laying in bed on HFNC, minimally responsive


Head: NC/AT


Neck: supple


Lungs: coarse breath sounds


CV: RRR, no M/R/G


Abdomen: soft, non-tender, bowel sounds present


Back: nontender


Extremities: no edema, pulses WNL


Skin: intact


Neuro: confused, no clear thoughts noted





- Vital Signs


Vital signs: 


                               Vital Signs - 12hr











  05/24/20 05/24/20 05/24/20





  03:00 03:54 04:00


 


Temperature   97.5 F L


 


Pulse Rate 101 H  93 H


 


Pulse Rate [   





Bilateral   





Throughout]   


 


Pulse Rate [  89 





From Monitor]   


 


Respiratory 27 H 28 H 28 H





Rate   


 


Respiratory   





Rate [Bilateral   





Throughout]   


 


Blood Pressure 104/61  99/65


 


O2 Sat by Pulse 96 98 97





Oximetry   














  05/24/20 05/24/20 05/24/20





  05:00 05:24 06:00


 


Temperature   


 


Pulse Rate 58 L 59 L 73


 


Pulse Rate [   





Bilateral   





Throughout]   


 


Pulse Rate [   





From Monitor]   


 


Respiratory 16  28 H





Rate   


 


Respiratory   





Rate [Bilateral   





Throughout]   


 


Blood Pressure 99/65 88/54 83/51


 


O2 Sat by Pulse   





Oximetry   














  05/24/20 05/24/20 05/24/20





  07:00 08:00 08:23


 


Temperature  97.5 F L 


 


Pulse Rate 70 100 H 


 


Pulse Rate [   100 H





Bilateral   





Throughout]   


 


Pulse Rate [  100 H 





From Monitor]   


 


Respiratory 28 H 22 





Rate   


 


Respiratory   20





Rate [Bilateral   





Throughout]   


 


Blood Pressure 97/61 102/64 


 


O2 Sat by Pulse 100 98 97





Oximetry   














  05/24/20 05/24/20 05/24/20





  08:53 09:01 10:01


 


Temperature   


 


Pulse Rate  94 H 105 H


 


Pulse Rate [   





Bilateral   





Throughout]   


 


Pulse Rate [   





From Monitor]   


 


Respiratory  25 H 21





Rate   


 


Respiratory   





Rate [Bilateral   





Throughout]   


 


Blood Pressure  102/64 102/64


 


O2 Sat by Pulse 97 94 96





Oximetry   














  05/24/20 05/24/20 05/24/20





  11:01 12:00 13:01


 


Temperature  97.8 F 


 


Pulse Rate 102 H 106 H 99 H


 


Pulse Rate [   





Bilateral   





Throughout]   


 


Pulse Rate [  106 H 





From Monitor]   


 


Respiratory 22 24 21





Rate   


 


Respiratory   





Rate [Bilateral   





Throughout]   


 


Blood Pressure 102/64 105/76 105/76


 


O2 Sat by Pulse 95 94 95





Oximetry   














  05/24/20





  13:08


 


Temperature 


 


Pulse Rate 


 


Pulse Rate [ 





Bilateral 





Throughout] 


 


Pulse Rate [ 





From Monitor] 


 


Respiratory 





Rate 


 


Respiratory 





Rate [Bilateral 





Throughout] 


 


Blood Pressure 105/76


 


O2 Sat by Pulse 





Oximetry 














- Lab





                                 05/23/20 05:15





                                 05/24/20 03:15


                             Most recent lab results











ABG pH  7.317 pH Units (7.350-7.450)  L  05/23/20  03:30    


 


ABG pCO2  55.1 mm Hg  05/23/20  03:30    


 


ABG pO2  73.4 mm Hg (80.0-90.0)  L  05/23/20  03:30    


 


ABG HCO3  27.6 mmol/L (20.0-26.0)  H  05/23/20  03:30    


 


ABG O2 Saturation  94.3 % (95.0-99.0)  L  05/23/20  03:30    


 


Calcium  8.2 mg/dL (8.4-10.2)  L  05/24/20  03:15    


 


Phosphorus  7.00 mg/dL (2.5-4.5)  H  05/21/20  05:35    


 


Magnesium  1.80 mg/dL (1.7-2.3)   05/23/20  05:15    


 


Urine Creatinine  216.1 mg/dL (0.1-20.0)  H  05/19/20  Unknown


 


Urine Sodium  10 mmol/L  05/19/20  Unknown


 


Urine Total Protein  62 mg/dL (5-11.8)  H  05/19/20  Unknown














Medications & Allergies





- Medications


Allergies/Adverse Reactions: 


                                    Allergies





No Known Allergies Allergy (Unverified 05/13/18 11:23)


   








Home Medications: 


                                Home Medications











 Medication  Instructions  Recorded  Confirmed  Last Taken  Type


 


No Known Home Medications [No  05/16/20 05/16/20 Unknown History





Reported Home Medications]     











Active Medications: 














Generic Name Dose Route Start Last Admin





  Trade Name Freq  PRN Reason Stop Dose Admin


 


Acetaminophen  650 mg  05/16/20 23:45  05/23/20 21:37





  Tylenol  PO   650 mg





  Q4H PRN   Administration





  Pain MILD(1-3)/Fever >100.5/HA  


 


Albuterol  2.5 mg  05/17/20 14:57 





  Proventil  IH  





  Q4HRT PRN  





  Shortness Of Breath  


 


Albuterol/Ipratropium  1 ampul  05/17/20 20:00  05/24/20 08:07





  Duoneb *Not For Prn Use*  IH   1 ampul





  TIDRT AMANDA   Administration


 


Lipase/Protease/Amylase  1 each  05/20/20 15:33 





  Pancrewillie Cloud 10,500 Unit  FEEDTUBE  





  PRN PRN  





  For Clogged Feeding Tube  


 


Arformoterol Tartrate  15 mcg  05/17/20 20:00  05/24/20 08:07





  Brovana Nebu  IH   15 mcg





  Q12HRT AMANDA   Administration


 


Aspirin  325 mg  05/17/20 10:00  05/24/20 10:42





  Ecotrin  PO   325 mg





  QDAY [facility]   Administration


 


Atorvastatin Calcium  40 mg  05/17/20 22:00  05/23/20 21:37





  Lipitor  PO   40 mg





  QHS AMANDA   Administration


 


Budesonide  0.5 mg  05/17/20 20:00  05/24/20 08:07





  Pulmicort  IH   0.5 mg





  Q12HRT AMANDA   Administration


 


Dextrose  50 ml  05/20/20 17:44 





  D50w (25gm) Syringe  IV  





  Q30MIN PRN  





  Hypoglycemia  





  Protocol  


 


Diltiazem HCl  30 mg  05/17/20 18:00  05/24/20 13:08





  Cardizem  PO   Not Given





  Q6HR AMANDA  


 


Famotidine  20 mg  05/21/20 10:00  05/24/20 10:42





  Pepcid  IV   20 mg





  QDAY [facility]   Administration


 


Norepinephrine  4 mg in 250 mls @ 7.5 mls/hr  05/20/20 18:00  05/21/20 16:00





  Levophed Drip 4 Mg/Ns 250 Ml  IV   0 mcg/min





  TITR AMANDA   0 mls/hr





    Titration





  Protocol  





  2 MCG/MIN  


 


Vasopressin 20 unit/ Sodium  101 mls @ 9.09 mls/hr  05/20/20 18:00  05/20/20 

18:30





  Chloride  IV   0 units/min





  TITR AMANDA   0 mls/hr





    Titration





  Protocol  





  0.03 UNITS/MIN  


 


Magnesium Hydroxide  30 ml  05/16/20 23:45 





  Milk Of Magnesia  PO  





  Q4H PRN  





  Constipation  


 


Methylprednisolone Sodium Succinate  40 mg  05/21/20 10:00  05/24/20 10:42





  Solu-Medrol  IV   40 mg





  Q12HR [facility]   Administration


 


Morphine Sulfate  2 mg  05/16/20 23:45 





  Morphine  IV  





  Q5MIN PRN  





  Chest Pain unrelieved by NTG  


 


Nitroglycerin  0.4 mg  05/16/20 23:45 





  Nitrostat  SL  





  .Q5MIN PRN  





  Chest Pain  


 


Ondansetron HCl  4 mg  05/16/20 23:45 





  Zofran  IV  





  Q8H PRN  





  Nausea And Vomiting  


 


Simple Syrup  15 ml  05/20/20 15:33 





  Simple Syrup  FEEDTUBE  





  PRN PRN  





  Hypoglycemia  


 


Simple Syrup  30 ml  05/20/20 15:33 





  Simple Syrup  FEEDTUBE  





  PRN PRN  





  Hypoglycemia  


 


Sodium Bicarbonate  325 mg  05/20/20 15:33 





  Sodium Bicarbonate  FEEDTUBE  





  PRN PRN  





  For Clogged Feeding Tube  


 


Sodium Chloride  10 ml  05/17/20 10:00  05/24/20 10:42





  Sodium Chloride Flush Syringe 10 Ml  IV   10 ml





  BID AMANDA   Administration


 


Sodium Chloride  10 ml  05/16/20 23:45 





  Sodium Chloride Flush Syringe 10 Ml  IV  





  PRN PRN  





  LINE FLUSH

## 2020-05-24 NOTE — PROGRESS NOTE
Assessment and Plan


Assessment and plan: 


5/24 : Patient is weaned off BiPAP


On NC, high flow oxygen O2 40 L/min, FiO2 75%, O2 sat 93%


Receiving tube feeding, renal function, LFTs trending down





5/23/2020; patient remains on continuous BiPAP


Receiving tube feeding


Transaminases trending down


Renal function slightly improved





--Metabolic encephalopathy;lethergy


Due to underlying disease process


Treat the underlying cause, supportive care





--Nutrition; tube feeding as patient is lethargic


Speech and swallow evaluation.


--Acute hypoxic respiratory failure; on continuous BiPAP


Titrate O2 sats to more than 90%, nebulizers


IV steroids, pulmonary following,





--Hypotension shock/on vasopressors;


Off vasopressors, reasonable blood pressures





--Severe pulmonary hypertension; on echo


Continue current management,





--Cor pulmonale/severe pulmonary hypertension;


Management per pulmonary





--Acute exacerbation of COPD; on home oxygen


Oxygen, nebulizers, IV steroids.





--Acute on chronic systolic congestive heart failure; EF 20 to 25%


Continue heart failure medications, input output monitoring


Low-sodium diet, fluid restriction, cardiology following





--Atrial flutter with variable conduction;


On Cardizem, chronic anticoagulation per cardiology


Sotalol discontinued by cardiol due to bradycardia 





--Severe thrombocytopenia; Heparin drip discontinued


HIT panel ordered, pending report





--Acute transaminitis; shock liver /to liver congestion


Due to cardiomyopathy.  And hypotension 


Trending down





--Acute kidney injury; vasomotor nephropathy


Management per nephrology, avoid nephrotoxins





--Hyponatremia; mild improvement


Secondary to fluid overload, current management


Diuretic therapy, follow electrolytes, nephrology following





--Symptomatic hypoglycemia; [5/19/2020]


Received D50, D10 IV fluids, significantly improved


Closely monitor blood sugars adjust as needed





--Generalized anasarca/edema


Due to cardiomyopathy, severe pulmonary hypertension


renal failure.  Treat the underlying cause





--Morbid obesity; BMI 38.6


Partly due to fluid overload


Continue supportive care





--DVT prophylaxis;


Patient is on heparin drip





--Full CODE STATUS.





Patient has multiple medical problems


Critically ill, poor prognosis





Plan of care reviewed with the patient'S nurse


Tube feeding per protocol





Critical care time 34 minutes








Brief history:


70-year-old female with known history of COPD tension and CHF presenting was 

admitted through emergency room with lower extremity swelling, chest pain 

Patient is on home oxygen for COPD and she is also on Lasix for CHF.  

Noncompliant with medications .  Admitted with acute hypoxic respiratory failure

 and COPD exacerbation as well as acute on chronic systolic congestive heart 

failure, fluid overload, pulmonary cardiology nephrology following the patient.


Patient was maintained on BiPAP and tube feeding, today BiPAP is weaned off on 

high-dose nasal cannula oxygen.  Had shock liver with LFTs in thousands


Trending down, Patient is obese, encephalopathic severe malnutrition, and 

cachectic with poor prognosis











History


Interval history: 


Patient seen and examined at bedside in [facility] this morning


Patient's chart, medications tests and overnight events reviewed


Patient is off BiPAP, currently on high flow nasal cannula oxygen


Saturating 93%[NC O2 40 L/min, FiO2 75%]


Patient is minimally communicative, in mild distress


Vital signs noted








Hospitalist Physical





- Constitutional


Vitals: 


                                        











Temp Pulse Resp BP Pulse Ox


 


 97.8 F   106 H  18   105/76   93 


 


 05/24/20 12:00  05/24/20 15:00  05/24/20 15:00  05/24/20 13:08  05/24/20 15:22











General appearance: Present: mild distress, well-nourished, obese (Morbidly 

obese), other (On high flow NC oxygen, lethergic)





- EENT


Eyes: Present: PERRL, EOM intact





- Neck


Neck: Present: supple, normal ROM





- Respiratory


Respiratory effort: normal


Respiratory: bilateral: diminished, rhonchi, negative: rales, wheezing





- Cardiovascular


Rhythm: regular


Heart Sounds: Present: S1 & S2





- Extremities


Extremities: no ischemia


Extremity abnormal: edema





- Abdominal


General gastrointestinal: soft, non-tender, non-distended, normal bowel sounds





- Integumentary


Integumentary: Present: clear, warm





- Psychiatric


Psychiatric: other (Lethargic, minimally communicative)





- Neurologic


Neurologic: CNII-XII intact, moves all extremities





HEART Score





- HEART Score


EKG: Non-specific


Age: > 65


Risk factors: > 3 risk factors or hx of atherosclerotic disease


Troponin: 


                                        











Troponin T  0.126 ng/mL (0.00-0.029)  H*  05/17/20  05:29    











Troponin: < normal limit





- Critical Actions


Critical Actions: 4-6 pts:12-16.6% risk of adverse cardiac event. Should be 

admitted





Results





- Labs


CBC & Chem 7: 


                                 05/23/20 05:15





                                 05/24/20 03:15


Labs: 


                             Laboratory Last Values











WBC  10.5 K/mm3 (4.5-11.0)   05/23/20  05:15    


 


RBC  4.16 M/mm3 (3.65-5.03)   05/23/20  05:15    


 


Hgb  14.3 gm/dl (10.1-14.3)   05/23/20  05:15    


 


Hct  43.9 % (30.3-42.9)  H  05/23/20  05:15    


 


MCV  105 fl (79-97)  H  05/23/20  05:15    


 


MCH  34 pg (28-32)  H  05/23/20  05:15    


 


MCHC  33 % (30-34)   05/23/20  05:15    


 


RDW  20.2 % (13.2-15.2)  H  05/23/20  05:15    


 


Plt Count  30 K/mm3 (140-440)  L  05/23/20  05:15    


 


Lymph % (Auto)  5.2 % (13.4-35.0)  L  05/23/20  05:15    


 


Mono % (Auto)  5.0 % (0.0-7.3)   05/23/20  05:15    


 


Eos % (Auto)  0.0 % (0.0-4.3)   05/23/20  05:15    


 


Baso % (Auto)  0.1 % (0.0-1.8)   05/23/20  05:15    


 


Lymph #  0.5 K/mm3 (1.2-5.4)  L  05/23/20  05:15    


 


Mono #  0.5 K/mm3 (0.0-0.8)   05/23/20  05:15    


 


Eos #  0.0 K/mm3 (0.0-0.4)   05/23/20  05:15    


 


Baso #  0.0 K/mm3 (0.0-0.1)   05/23/20  05:15    


 


Add Manual Diff  Complete   05/22/20  04:30    


 


Total Counted  100   05/22/20  04:30    


 


Seg Neutrophils %  89.7 % (40.0-70.0)  H  05/23/20  05:15    


 


Seg Neuts % (Manual)  89.0 % (40.0-70.0)  H  05/22/20  04:30    


 


Band Neutrophils %  1.0 %  05/22/20  04:30    


 


Lymphocytes % (Manual)  7.0 % (13.4-35.0)  L  05/22/20  04:30    


 


Reactive Lymphs % (Man)  0 %  05/22/20  04:30    


 


Monocytes % (Manual)  3.0 % (0.0-7.3)   05/22/20  04:30    


 


Eosinophils % (Manual)  0 % (0.0-4.3)   05/22/20  04:30    


 


Basophils % (Manual)  0 % (0.0-1.8)   05/22/20  04:30    


 


Metamyelocytes %  0 %  05/22/20  04:30    


 


Myelocytes %  0 %  05/22/20  04:30    


 


Promyelocytes %  0 %  05/22/20  04:30    


 


Blast Cells %  0 %  05/22/20  04:30    


 


Nucleated RBC %  Not Reportable   05/22/20  04:30    


 


Seg Neutrophils #  9.4 K/mm3 (1.8-7.7)  H  05/23/20  05:15    


 


Seg Neutrophils # Man  10.9 K/mm3 (1.8-7.7)  H  05/22/20  04:30    


 


Band Neutrophils #  0.1 K/mm3  05/22/20  04:30    


 


Lymphocytes # (Manual)  0.9 K/mm3 (1.2-5.4)  L  05/22/20  04:30    


 


Abs React Lymphs (Man)  0.0 K/mm3  05/22/20  04:30    


 


Monocytes # (Manual)  0.4 K/mm3 (0.0-0.8)   05/22/20  04:30    


 


Eosinophils # (Manual)  0.0 K/mm3 (0.0-0.4)   05/22/20  04:30    


 


Basophils # (Manual)  0.0 K/mm3 (0.0-0.1)   05/22/20  04:30    


 


Metamyelocytes #  0.0 K/mm3  05/22/20  04:30    


 


Myelocytes #  0.0 K/mm3  05/22/20  04:30    


 


Promyelocytes #  0.0 K/mm3  05/22/20  04:30    


 


Blast Cells #  0.0 K/mm3  05/22/20  04:30    


 


WBC Morphology  Not Reportable   05/22/20  04:30    


 


Hypersegmented Neuts  Not Reportable   05/22/20  04:30    


 


Hyposegmented Neuts  Not Reportable   05/22/20  04:30    


 


Hypogranular Neuts  Not Reportable   05/22/20  04:30    


 


Smudge Cells  Not Reportable   05/22/20  04:30    


 


Toxic Granulation  Not Reportable   05/22/20  04:30    


 


Toxic Vacuolation  Not Reportable   05/22/20  04:30    


 


Dohle Bodies  Not Reportable   05/22/20  04:30    


 


Pelger-Huet Anomaly  Not Reportable   05/22/20  04:30    


 


Chitra Rods  Not Reportable   05/22/20  04:30    


 


Platelet Estimate  Consistent w auto   05/22/20  04:30    


 


Clumped Platelets  Not Reportable   05/22/20  04:30    


 


Plt Clumps, EDTA  Not Reportable   05/22/20  04:30    


 


Large Platelets  Not Reportable   05/22/20  04:30    


 


Giant Platelets  Not Reportable   05/22/20  04:30    


 


Platelet Satelliting  Not Reportable   05/22/20  04:30    


 


Plt Morphology Comment  Not Reportable   05/22/20  04:30    


 


RBC Morphology  Not Reportable   05/22/20  04:30    


 


Dimorphic RBCs  Not Reportable   05/22/20  04:30    


 


Polychromasia  Not Reportable   05/22/20  04:30    


 


Hypochromasia  Not Reportable   05/22/20  04:30    


 


Poikilocytosis  Not Reportable   05/22/20  04:30    


 


Anisocytosis  1+   05/22/20  04:30    


 


Microcytosis  Not Reportable   05/22/20  04:30    


 


Macrocytosis  1+   05/22/20  04:30    


 


Spherocytes  Few   05/22/20  04:30    


 


Pappenheimer Bodies  Not Reportable   05/22/20  04:30    


 


Sickle Cells  Not Reportable   05/22/20  04:30    


 


Target Cells  Not Reportable   05/22/20  04:30    


 


Tear Drop Cells  Not Reportable   05/22/20  04:30    


 


Ovalocytes  Few   05/22/20  04:30    


 


Helmet Cells  Not Reportable   05/22/20  04:30    


 


Yeager-St. Johns Bodies  Not Reportable   05/22/20  04:30    


 


Cabot Rings  Not Reportable   05/22/20  04:30    


 


Riley Cells  Few   05/22/20  04:30    


 


Bite Cells  Not Reportable   05/22/20  04:30    


 


Crenated Cell  Not Reportable   05/22/20  04:30    


 


Elliptocytes  Not Reportable   05/22/20  04:30    


 


Acanthocytes (Spur)  Not Reportable   05/22/20  04:30    


 


Rouleaux  Not Reportable   05/22/20  04:30    


 


Hemoglobin C Crystals  Not Reportable   05/22/20  04:30    


 


Schistocytes  Not Reportable   05/22/20  04:30    


 


Malaria parasites  Not Reportable   05/22/20  04:30    


 


Wes Bodies  Not Reportable   05/22/20  04:30    


 


Hem Pathologist Commnt  No   05/22/20  04:30    


 


PT  15.3 Sec. (12.2-14.9)  H  05/17/20  03:25    


 


INR  1.20  (0.87-1.13)  H  05/17/20  03:25    


 


APTT  29.0 Sec. (24.2-36.6)   05/17/20  03:25    


 


Heparin Anti-Xa Level  0.12 U.I./ml (0.3-0.7)  L  05/20/20  Unknown


 


ABG pH  7.317 pH Units (7.350-7.450)  L  05/23/20  03:30    


 


ABG pCO2  55.1 mm Hg  05/23/20  03:30    


 


ABG pO2  73.4 mm Hg (80.0-90.0)  L  05/23/20  03:30    


 


ABG HCO3  27.6 mmol/L (20.0-26.0)  H  05/23/20  03:30    


 


ABG O2 Saturation  94.3 % (95.0-99.0)  L  05/23/20  03:30    


 


ABG O2 Content  18.2  (0.0-44)   05/23/20  03:30    


 


ABG Base Excess  0.4 mmol/L (-2.0-3.0)   05/23/20  03:30    


 


ABG Hemoglobin  14.0 gm/dl (12.0-16.0)   05/23/20  03:30    


 


ABG Carboxyhemoglobin  1.7 % (0.0-5.0)   05/23/20  03:30    


 


ABG Methemoglobin  0.5 % (0.0-1.5)   05/23/20  03:30    


 


Oxyhemoglobin  92.3 % (95.0-99.0)  L  05/23/20  03:30    


 


FiO2  60 %  05/23/20  03:30    


 


Sodium  135 mmol/L (137-145)  L  05/24/20  03:15    


 


Potassium  4.6 mmol/L (3.6-5.0)  D 05/24/20  03:15    


 


Chloride  93.5 mmol/L ()  L  05/24/20  03:15    


 


Carbon Dioxide  28 mmol/L (22-30)   05/24/20  03:15    


 


Anion Gap  18 mmol/L  05/24/20  03:15    


 


BUN  73 mg/dL (7-17)  H  05/24/20  03:15    


 


Creatinine  2.2 mg/dL (0.7-1.2)  H  05/24/20  03:15    


 


Estimated GFR  27 ml/min  05/24/20  03:15    


 


BUN/Creatinine Ratio  33 %  05/24/20  03:15    


 


Glucose  128 mg/dL ()  H  05/24/20  03:15    


 


POC Glucose  204  ()  H  05/23/20  00:26    


 


Calcium  8.2 mg/dL (8.4-10.2)  L  05/24/20  03:15    


 


Phosphorus  7.00 mg/dL (2.5-4.5)  H  05/21/20  05:35    


 


Magnesium  1.80 mg/dL (1.7-2.3)   05/23/20  05:15    


 


Total Bilirubin  1.60 mg/dL (0.1-1.2)  H  05/24/20  03:15    


 


AST  159 units/L (5-40)  H  05/24/20  03:15    


 


ALT  514 units/L (7-56)  H  05/24/20  03:15    


 


Alkaline Phosphatase  85 units/L ()   05/24/20  03:15    


 


Total Creatine Kinase  112 units/L ()   05/18/20  16:23    


 


CK-MB (CK-2)  3.5 ng/mL (0.0-4.0)   05/18/20  16:23    


 


Troponin T  0.126 ng/mL (0.00-0.029)  H*  05/17/20  05:29    


 


NT-Pro-B Natriuret Pep  3719 pg/mL (0-900)  H  05/16/20  22:13    


 


Total Protein  5.7 g/dL (6.3-8.2)  L  05/24/20  03:15    


 


Albumin  2.7 g/dL (3.9-5)  L  05/24/20  03:15    


 


Albumin/Globulin Ratio  0.9 %  05/24/20  03:15    


 


Triglycerides  68 mg/dL (2-149)   05/17/20  00:12    


 


Cholesterol  83 mg/dL ()   05/17/20  00:12    


 


LDL Cholesterol Direct  33 mg/dL ()  L  05/17/20  00:12    


 


HDL Cholesterol  49 mg/dL (40-59)   05/17/20  00:12    


 


Cholesterol/HDL Ratio  1.69 %  05/17/20  00:12    


 


Urine Color  Mandi  (Yellow)   05/19/20  Unknown


 


Urine Turbidity  Slightly-cloudy  (Clear)   05/19/20  Unknown


 


Urine pH  5.0  (5.0-7.0)   05/19/20  Unknown


 


Ur Specific Gravity  1.014  (1.003-1.030)   05/19/20  Unknown


 


Urine Protein  30 mg/dl mg/dL (Negative)   05/19/20  Unknown


 


Urine Glucose (UA)  Neg mg/dL (Negative)   05/19/20  Unknown


 


Urine Ketones  Neg mg/dL (Negative)   05/19/20  Unknown


 


Urine Blood  Neg  (Negative)   05/19/20  Unknown


 


Urine Nitrite  Neg  (Negative)   05/19/20  Unknown


 


Urine Bilirubin  Neg  (Negative)   05/19/20  Unknown


 


Urine Urobilinogen  2.0 mg/dL (<2.0)   05/19/20  Unknown


 


Ur Leukocyte Esterase  Sm  (Negative)   05/19/20  Unknown


 


Urine WBC (Auto)  2.0 /HPF (0.0-6.0)   05/19/20  Unknown


 


Urine RBC (Auto)  2.0 /HPF (0.0-6.0)   05/19/20  Unknown


 


U Epithel Cells (Auto)  2.0 /HPF (0-13.0)   05/19/20  Unknown


 


Urine Bacteria (Auto)  1+ /HPF (Negative)   05/19/20  Unknown


 


Urine Osmolality  319 Mosm/kg  05/19/20  Unknown


 


Urine Creatinine  216.1 mg/dL (0.1-20.0)  H  05/19/20  Unknown


 


Protein/Creatinin Ratio  0.29   05/19/20  Unknown


 


Urine Sodium  10 mmol/L  05/19/20  Unknown


 


Urine Total Protein  62 mg/dL (5-11.8)  H  05/19/20  Unknown














- Diagnostic Impressions


Diagnostic Impressions: 








Echocardiogram  05/16/20 23:50


Transthoracic Echocardiogram


 


Indication:


CHF


BP:           84/59


HR:           117


 


Conclusions


 *4-chamber dilated cardiomyopathy.


 *The right heart chambers are both severely dilated. There is


moderately severe tricuspid regurgitation, and severe pulmonary HTN,


with PASP of 82mmHg.


 *Left heart chambers are moderately dilated.


 *Global left ventricular systolic function is severely decreased.


 *The estimated ejection fraction is 20-25%. 


 *There is mild mitral regurgitation. 


 


Findings     


Left Ventricle:


The left ventricular chamber size is moderately dilated. There is no


left ventricular hypertrophy. Severe global hypokinesis of the left


ventricle is observed. Global left ventricular systolic function is


severely decreased. The estimated ejection fraction is 20-25%.  Abnormal


left ventricular diastolic function is observed. 


 


Left Atrium:


The left atrium is moderate to severely dilated.  


 


Right Ventricle:


The right ventricle is severely dilated.  The right ventricular global


systolic function is severely reduced. 


 


Right Atrium:


The right atrial cavity size is severely dilated. 


 


Aortic Valve:


The aortic valve leaflets are moderately thickened. There is trace of


aortic regurgitation. There is no evidence of aortic stenosis. 


 


Mitral Valve:


The mitral valve leaflets are moderately thickened. There is mild mitral


regurgitation.  There is no evidence of mitral stenosis. 


 


Tricuspid Valve:


The tricuspid valve leaflets are normal.  There is moderate to severe


tricuspid regurgitation. The right ventricular systolic pressure is


calculated at 82 mmHg.  There is evidence of severe pulmonary


hypertension. There is no tricuspid stenosis. 


 


Pulmonic Valve:


The pulmonic valve appears normal. There is mild pulmonic regurgitation.


 


 


Pericardium:


A trivial pericardial effusion is visualized. 


 


Aorta:


There is no dilatation of the ascending aorta. There is no dilatation of


the aortic root. 


 


Venous:


The inferior vena cava is dilated.  There is less than 50% respiratory


change in the inferior vena cava dimension. 


 


Measurements     


Chambers 2D


Name                    Value         Normal Range            


IVSd (2D)               1.56 cm       (0.6 - 1.1)             


LVPWd (2D)              1.56 cm       (0.6 - 1.1)             


LVIDd (2D)              5.05 cm       (3.7 - 5.6)             


LVIDs (2D)              4.47 cm       (2 - 3.8)               


LV FS (2D)              11.64 %       -                        


EF Teichholz (2D)       25.1 %        -                        


Ao root diameter (2D)   3.06 cm       (2 - 3.7)               


 


Volumes/Mass


Name                    Value         Normal Range            


LA ESV SP 4CH (A/L)     76.11 ml      -                        


LA ESV SP 2CH (A/L)     48.94 ml      -                        


LA ESV BP (A/L)         62.38 ml      -                        


LA ESV SP 4CH (MOD)     70.31 ml      -                        


LA ESV SP 2CH (MOD)     46.04 ml      -                        


LA ESV BP (MOD)         58.03 ml      -                        


LA ESV BP (MOD) index   28.87 ml/m2   -                        


LV EDV SP 4CH (MOD)     81.51 ml      -                        


LV ESV SP 4CH (MOD)     46.31 ml      -                        


EF SP 4CH (MOD)         43.18 %       -                        


LV EDV SP 2CH (MOD)     84.04 ml      -                        


LV ESV SP 2CH (MOD)     60.72 ml      -                        


EF SP 2CH (MOD)         27.76 %       -                        


LV EDV BP               84.52 ml      -                        


LV ESV BP               53.64 ml      -                        


BP EF (MOD)             36.54 %       -                        


 


Aortic Valve


Name                    Value         Normal Range            


LVOT diameter           2.01 cm       -                        


LVOT Vmax               0.64 m/sec    -                        


LVOT VTI                8.01 cm       -                        


LVOT peak gradient      1.62 mmHg     -                        


LVOT mean gradient      0.79 mmHg     -                        


SV LVOT                 25.42 ml      -                        


Ascending Ao            2.81 cm       -                        


 


Tricuspid Valve


Name                    Value         Normal Range            


TR Vmax                 4.09 m/sec    -                        


TR peak gradient        66.78 mmHg    -                        


RAP                     15 mmHg       -                        


RVSP                    82 mmHg       -                        


 


Pulmonic Valve/Qp:Qs


Name                    Value         Normal Range            


PV Vmax                 0.87 m/sec    -                        


PV peak gradient        3.06 mmHg     -                        


NE end-diastolic Vmax   2.66 m/sec    -                        


RVOT Vmax               0.7 m/sec     -                        


RVOT peak gradient      1.96 mmHg     -                        


PV acceleration time    53.28 msec    -                        


 


Electronically signed by: Shade Vlea MD on 05/17/2020 15:34:04











Min/IV: 


                                        





Voiding Method                   External Female Catheter


IV Catheter Type [Right          Triple Lumen Cath


Femoral]                         


IV Catheter Type [Right Foot]    INT / Saline Lock


IV Catheter Type [Right          INT / Saline Lock


Forearm]                         


IV Catheter Type [Left Wrist]    INT / Saline Lock


IV Catheter Type [Left Forearm   INT / Saline Lock


]                                











Active Medications





- Current Medications


Current Medications: 














Generic Name Dose Route Start Last Admin





  Trade Name Freq  PRN Reason Stop Dose Admin


 


Acetaminophen  650 mg  05/16/20 23:45  05/23/20 21:37





  Tylenol  PO   650 mg





  Q4H PRN   Administration





  Pain MILD(1-3)/Fever >100.5/HA  


 


Albuterol  2.5 mg  05/17/20 14:57 





  Proventil  IH  





  Q4HRT PRN  





  Shortness Of Breath  


 


Albuterol/Ipratropium  1 ampul  05/17/20 20:00  05/24/20 15:25





  Duoneb *Not For Prn Use*  IH   Not Given





  TIDRT AMANDA  


 


Lipase/Protease/Amylase  1 each  05/20/20 15:33 





  Pancreaze Dr 10,500 Unit  FEEDTUBE  





  PRN PRN  





  For Clogged Feeding Tube  


 


Arformoterol Tartrate  15 mcg  05/17/20 20:00  05/24/20 08:07





  Brovana Nebu  IH   15 mcg





  Q12HRT AMANDA   Administration


 


Aspirin  325 mg  05/17/20 10:00  05/24/20 10:42





  Ecotrin  PO   325 mg





  QDAY AMANDA   Administration


 


Atorvastatin Calcium  40 mg  05/17/20 22:00  05/23/20 21:37





  Lipitor  PO   40 mg





  QHS AMANDA   Administration


 


Budesonide  0.5 mg  05/17/20 20:00  05/24/20 08:07





  Pulmicort  IH   0.5 mg





  Q12HRT AMANDA   Administration


 


Dextrose  50 ml  05/20/20 17:44 





  D50w (25gm) Syringe  IV  





  Q30MIN PRN  





  Hypoglycemia  





  Protocol  


 


Diltiazem HCl  30 mg  05/17/20 18:00  05/24/20 13:08





  Cardizem  PO   Not Given





  Q6HR AMANDA  


 


Famotidine  20 mg  05/21/20 10:00  05/24/20 10:42





  Pepcid  IV   20 mg





  QDAY AMANDA   Administration


 


Norepinephrine  4 mg in 250 mls @ 7.5 mls/hr  05/20/20 18:00  05/21/20 16:00





  Levophed Drip 4 Mg/Ns 250 Ml  IV   0 mcg/min





  TITR AMANDA   0 mls/hr





    Titration





  Protocol  





  2 MCG/MIN  


 


Vasopressin 20 unit/ Sodium  101 mls @ 9.09 mls/hr  05/20/20 18:00  05/20/20 

18:30





  Chloride  IV   0 units/min





  TITR AMANDA   0 mls/hr





    Titration





  Protocol  





  0.03 UNITS/MIN  


 


Magnesium Hydroxide  30 ml  05/16/20 23:45 





  Milk Of Magnesia  PO  





  Q4H PRN  





  Constipation  


 


Methylprednisolone Sodium Succinate  40 mg  05/21/20 10:00  05/24/20 10:42





  Solu-Medrol  IV   40 mg





  Q12HR AMANDA   Administration


 


Morphine Sulfate  2 mg  05/16/20 23:45 





  Morphine  IV  





  Q5MIN PRN  





  Chest Pain unrelieved by NTG  


 


Nitroglycerin  0.4 mg  05/16/20 23:45 





  Nitrostat  SL  





  .Q5MIN PRN  





  Chest Pain  


 


Ondansetron HCl  4 mg  05/16/20 23:45 





  Zofran  IV  





  Q8H PRN  





  Nausea And Vomiting  


 


Simple Syrup  15 ml  05/20/20 15:33 





  Simple Syrup  FEEDTUBE  





  PRN PRN  





  Hypoglycemia  


 


Simple Syrup  30 ml  05/20/20 15:33 





  Simple Syrup  FEEDTUBE  





  PRN PRN  





  Hypoglycemia  


 


Sodium Bicarbonate  325 mg  05/20/20 15:33 





  Sodium Bicarbonate  FEEDTUBE  





  PRN PRN  





  For Clogged Feeding Tube  


 


Sodium Chloride  10 ml  05/17/20 10:00  05/24/20 10:42





  Sodium Chloride Flush Syringe 10 Ml  IV   10 ml





  BID AMANDA   Administration


 


Sodium Chloride  10 ml  05/16/20 23:45 





  Sodium Chloride Flush Syringe 10 Ml  IV  





  PRN PRN  





  LINE FLUSH  














Nutrition/Malnutrition Assess





- Dietary Evaluation


Nutrition/Malnutrition Findings: 


                                        





Nutrition Notes                                            Start:  05/20/20 15:2

3


Freq:                                                      Status: Active       




Protocol:                                                                       




 Document     05/22/20 08:16  LP  (Rec: 05/22/20 08:21  LP  [id number])


 Nutrition Notes


     Initial or Follow up                        Reassessment


     Current Diagnosis                           Acute Kidney Injury,COPD,


                                                 Hypertension,Heart Failure


     Other Pertinent Diagnosis                   chest pain, volume overload


     Current Diet                                Nepro at 30ml/hr


     Labs/Tests                                  Na 132


     Pertinent Medications                       Solumedrol


                                                 D5 NS at 75ml/hr


     Height                                      5 ft


     Weight                                      89.7 kg


     Ideal Body Weight (kg)                      45.45


     BMI                                         38.6


     Weight Status                               Obese


     Subjective/Other Information                Consult for TF but TF was


                                                 already ordered.


     Burn                                        Absent


     Trauma                                      Absent


     Minimum of two criteria                     No


     Fluid Accumulation                          Moderate to Severe (severe)


     #1


      Nutrition Diagnosis                        Inadequate oral intake


      Etiology                                   SOB


      As Evidenced by Signs and Symptoms         Pt unable to consume estimated


                                                 energy needs via PO


      Diagnosis Progress(for reassessment        Continues


       documentation)                            


     Is patient on ventilator?                   No


     Is Patient Ambulatory and/or Out of Bed     No


     [facility]      1611.840


     Kcal/Kg value to use for calculation        14


     Approximate Energy Requirements Using       1256


      kcal/Kg                                    


     Calculation Used for Recommendations        Kcal/kg


     Additional Notes                            Protein: 54-82g (0.8-1.2g/kg


                                                 using AdjBW 68kg)


                                                 Fluid 1ml/kcal or per MD


 Nutrition Intervention


     Change Diet Order:                          TF


     Nutrition Support:                          Nepro 1.8 at 30ml/hr


                                                 Flush 100ml q4h


     Kcal                                        1,296


     Protein (gm)                                58


     Fluid (mL)                                  523


     Goal #1                                     Meet at least 80% of kcal and


                                                 protein needs


     Anticipated Discharge Needs:                Unable to determine at this


                                                 time


     Follow-Up By:                               05/25/20


     Additional Comments                         Follow for TF start/tolerance

## 2020-05-24 NOTE — PROGRESS NOTE
Assessment and Plan


Acute Respiratory distress with hypoxia and hpercapnia


COPD


Severe pulmonary hypertension- probably combination of Group 2 and 3


-PASP 82


Hyponatremia


Acute toxic-metabolic encephalaopthy


Tobacco use disorder/Nicotine dependence


Non-ST elevation MI 


Acute on chronic congestive heart failure-systolic, EF 20-25%


Acute kidney injury secondary to vasomotor nephropathy and diuresis


Bilateral lower extremity edema


Anemia of chronic disease 


Morbid obesity


Thrombocytopenia











-Remove femoral CVC


-SLP to evaluate and treat tomorrow


-will need resumption of diuretics at some p[oint, defer Cardiology


- keep  BIPAP qhs and q4 prn, HFOT with oxygen restrictive strategies


- continue enteral nutrition as tolerated


- continue aspiration precautions, HOB >40


- continue systemic steroids with taper


- continue inhaled corticosteroids


- empiric CAP AB


-HIT panel pending


- PT/OT as tolerated


- mobility protocols for pressure ulcer prevention


- accuchecks with glycemic control per SSI for target BG < 140-180 mg/dl


- soft restraints as pulling at NGT , face mask


Discussed with RT and RN  at the bedside








-Supplemental oxygen wean to keep O2 sats 88-90%


-Bronchodilators- SAIDA/JULIEN and inhaled corticosteroids


-Currently on NSTEMI protocol per Cardiology


-VTE prophylaxis- therapeutic heparin on hold secondary to thrombocytopenia


-Monitor hemoglobin trends, transfuse as indicated to keep hgB >7g/dL


-Heart failure measures per cardiology


-Nicotine withdrawal precautions


-Smoking cessation counselling


-Continue management of underlying COPD


-Chronic home medications as clinically indicated


-Will need right and left heart cath 


-Sleep apnea evaluation and treatment as outpatient





CONDITION- CRITICAL


PROGNOSIS- GUARDED


CODE STATUS- FULL CODE





Life threatening condition: Severe pulmonary HTN, acute and chronic  hypoxic 

respiratory failure on high flow oxygen, acute on chronic systolic heart 

failure, hyponatremia, symptomatic hypoglycemia


Morbidity/Mortality: High


complexity of medical decision making: High








The high probability of a clinically significant, sudden or life-threatening 

deterioration of the [respiratory, cardiovascular,neurologic, renal ] system(s) 

required my full and direct attention, intervention and personal management. The

aggregate critical care time was [31] minutes without overlap. Time includes 

spent on;





[x] Data Review and interpretation 





[x] Patient assessment and monitoring of vital signs 





[x] Documentation 





[x] Medication orders and management








Subjective


Date of service: 05/24/20


Principal diagnosis: Ac hypoxemic and hypercapnic resp failure; AE-COPD; NSTEMI;

 MILAN; HFrEF


Interval history: 





Patient is seen today for: Acute hypoxemic and hypercapnic respiratory failure; 

AE-COPD; Severe Pulm HTN; Acute toxic-metabolic encephalaopthy; NSTEMI; MILAN; 

HFrEF





Seen and examined at bedside; 24hour events reviewed; nursing and respiratory 

care staff consulted; no adverse overnight events reported to me; resting 

peacefully in bed; remains on nocturnal  BIPAP with improving hypercapnia; more 

awake and  tolerating enteral nutrition via small bowel feeding tube.  Pulled 

out her feeding tube yesterday, needed replacement.


Vitals, labs, medications, chart and imaging reviewed. No fevers, no vomiting, 

no diarrhea








Objective


                               Vital Signs - 12hr











  05/23/20 05/23/20 05/23/20





  21:00 22:00 22:18


 


Temperature   


 


Pulse Rate 94 H 95 H 


 


Pulse Rate [   





Bilateral   





Throughout]   


 


Pulse Rate [   





From Monitor]   


 


Respiratory 24 19 





Rate   


 


Respiratory   





Rate [Bilateral   





Throughout]   


 


Blood Pressure 101/70 115/84 


 


O2 Sat by Pulse 94 95 95





Oximetry   














  05/23/20 05/23/20 05/23/20





  22:19 23:00 23:44


 


Temperature   


 


Pulse Rate  97 H 95 H


 


Pulse Rate [ 100 H  





Bilateral   





Throughout]   


 


Pulse Rate [   





From Monitor]   


 


Respiratory  19 18





Rate   


 


Respiratory 20  





Rate [Bilateral   





Throughout]   


 


Blood Pressure  120/67 124/71


 


O2 Sat by Pulse  95 95





Oximetry   














  05/23/20 05/24/20 05/24/20





  23:58 00:00 01:00


 


Temperature 97.4 F L  


 


Pulse Rate  96 H 97 H


 


Pulse Rate [   





Bilateral   





Throughout]   


 


Pulse Rate [  98 H 





From Monitor]   


 


Respiratory  23 16





Rate   


 


Respiratory   





Rate [Bilateral   





Throughout]   


 


Blood Pressure  124/71 116/72


 


O2 Sat by Pulse  93 87





Oximetry   














  05/24/20 05/24/20 05/24/20





  01:35 02:00 03:00


 


Temperature   


 


Pulse Rate 103 H 98 H 101 H


 


Pulse Rate [   





Bilateral   





Throughout]   


 


Pulse Rate [   





From Monitor]   


 


Respiratory 28 H 27 H 27 H





Rate   


 


Respiratory   





Rate [Bilateral   





Throughout]   


 


Blood Pressure 95/56 95/56 104/61


 


O2 Sat by Pulse 92 95 96





Oximetry   














  05/24/20 05/24/20 05/24/20





  03:54 04:00 05:00


 


Temperature  97.5 F L 


 


Pulse Rate  93 H 58 L


 


Pulse Rate [   





Bilateral   





Throughout]   


 


Pulse Rate [ 89  





From Monitor]   


 


Respiratory 28 H 28 H 16





Rate   


 


Respiratory   





Rate [Bilateral   





Throughout]   


 


Blood Pressure  99/65 99/65


 


O2 Sat by Pulse 98 97 





Oximetry   














  05/24/20





  05:24


 


Temperature 


 


Pulse Rate 59 L


 


Pulse Rate [ 





Bilateral 





Throughout] 


 


Pulse Rate [ 





From Monitor] 


 


Respiratory 





Rate 


 


Respiratory 





Rate [Bilateral 





Throughout] 


 


Blood Pressure 88/54


 


O2 Sat by Pulse 





Oximetry 











Constitutional: no acute distress, other (on BIPAP via FFM)


Eyes: non-icteric


ENT: oropharynx dry


Neck: supple, no lymphadenopathy


Effort: mildly labored


Ascultation: Bilateral: diminished breath sounds (poor AE bilaterally), rhonchi


Percussion: Bilateral: not dull


Cardiovascular: irregular rhythm, other ( S1,S2)


Gastrointestinal: normoactive bowel sounds, soft, non-tender, non-distended


Integumentary: rash, other (lower extemity edema +)


Extremities: no cyanosis, pink and warm, pulses normal, no ischemia or 

petechiae, edema


Neurologic: non-focal exam (moves all extemities), pupils equal and round, other

(lethargic)


Psychiatric: other (unable to assess secondary to mental status)


CBC and BMP: 


                                 05/23/20 05:15





                                 05/24/20 03:15


ABG, PT/INR, D-dimer: 


ABG











ABG pH  7.317 pH Units (7.350-7.450)  L  05/23/20  03:30    


 


ABG pCO2  55.1 mm Hg  05/23/20  03:30    


 


ABG pO2  73.4 mm Hg (80.0-90.0)  L  05/23/20  03:30    


 


ABG O2 Saturation  94.3 % (95.0-99.0)  L  05/23/20  03:30    





PT/INR, D-dimer











PT  15.3 Sec. (12.2-14.9)  H  05/17/20  03:25    


 


INR  1.20  (0.87-1.13)  H  05/17/20  03:25    








Abnormal lab findings: 


                                  Abnormal Labs











  05/16/20 05/16/20 05/17/20





  22:13 22:13 00:00


 


WBC   


 


Hct  43.2 H   45.8 H


 


MCV  107 H   106 H


 


MCH  34 H   33 H


 


RDW  20.2 H   19.7 H


 


Plt Count   


 


Lymph % (Auto)   


 


Lymph #   


 


Seg Neutrophils %  77.3 H   78.8 H


 


Seg Neuts % (Manual)   


 


Lymphocytes % (Manual)   


 


Nucleated RBC %   


 


Seg Neutrophils #   


 


Seg Neutrophils # Man   


 


Lymphocytes # (Manual)   


 


PT   


 


INR   


 


Heparin Anti-Xa Level   


 


ABG pH   


 


ABG pO2   


 


ABG HCO3   


 


ABG O2 Saturation   


 


Oxyhemoglobin   


 


Sodium   130 L 


 


Potassium   


 


Chloride   85.0 L 


 


Carbon Dioxide   35 H 


 


BUN   


 


Creatinine   


 


Glucose   112 H 


 


POC Glucose   


 


Calcium   


 


Phosphorus   


 


Total Bilirubin   


 


AST   


 


ALT   5 L 


 


Troponin T   


 


NT-Pro-B Natriuret Pep   3719 H 


 


Total Protein   


 


Albumin   3.4 L 


 


LDL Cholesterol Direct   


 


Urine Creatinine   


 


Urine Total Protein   














  05/17/20 05/17/20 05/17/20





  00:00 00:12 03:25


 


WBC   


 


Hct   


 


MCV   


 


MCH   


 


RDW   


 


Plt Count   


 


Lymph % (Auto)   


 


Lymph #   


 


Seg Neutrophils %   


 


Seg Neuts % (Manual)   


 


Lymphocytes % (Manual)   


 


Nucleated RBC %   


 


Seg Neutrophils #   


 


Seg Neutrophils # Man   


 


Lymphocytes # (Manual)   


 


PT   


 


INR   


 


Heparin Anti-Xa Level   


 


ABG pH   


 


ABG pO2   


 


ABG HCO3   


 


ABG O2 Saturation   


 


Oxyhemoglobin   


 


Sodium  131 L  


 


Potassium   


 


Chloride  85.3 L  


 


Carbon Dioxide  35 H  


 


BUN   


 


Creatinine   


 


Glucose  108 H  


 


POC Glucose   


 


Calcium   


 


Phosphorus   


 


Total Bilirubin   


 


AST   


 


ALT   


 


Troponin T   0.051 H D  0.113 H* D


 


NT-Pro-B Natriuret Pep   


 


Total Protein   


 


Albumin   


 


LDL Cholesterol Direct   33 L 


 


Urine Creatinine   


 


Urine Total Protein   














  05/17/20 05/17/20 05/17/20





  03:25 03:25 03:25


 


WBC   


 


Hct   


 


MCV  107 H  


 


MCH  34 H  


 


RDW  20.1 H  


 


Plt Count   


 


Lymph % (Auto)   


 


Lymph #   


 


Seg Neutrophils %  77.0 H  


 


Seg Neuts % (Manual)   


 


Lymphocytes % (Manual)   


 


Nucleated RBC %   


 


Seg Neutrophils #   


 


Seg Neutrophils # Man   


 


Lymphocytes # (Manual)   


 


PT   15.3 H 


 


INR   1.20 H 


 


Heparin Anti-Xa Level   


 


ABG pH   


 


ABG pO2   


 


ABG HCO3   


 


ABG O2 Saturation   


 


Oxyhemoglobin   


 


Sodium    133 L


 


Potassium   


 


Chloride    86.1 L


 


Carbon Dioxide    36 H


 


BUN   


 


Creatinine   


 


Glucose    116 H


 


POC Glucose   


 


Calcium   


 


Phosphorus   


 


Total Bilirubin   


 


AST   


 


ALT   


 


Troponin T   


 


NT-Pro-B Natriuret Pep   


 


Total Protein   


 


Albumin   


 


LDL Cholesterol Direct   


 


Urine Creatinine   


 


Urine Total Protein   














  05/17/20 05/17/20 05/18/20





  05:29 13:51 04:45


 


WBC   


 


Hct   


 


MCV    107 H


 


MCH    34 H


 


RDW    20.1 H


 


Plt Count    136 L


 


Lymph % (Auto)   


 


Lymph #   


 


Seg Neutrophils %   


 


Seg Neuts % (Manual)   


 


Lymphocytes % (Manual)   


 


Nucleated RBC %   


 


Seg Neutrophils #   


 


Seg Neutrophils # Man   


 


Lymphocytes # (Manual)   


 


PT   


 


INR   


 


Heparin Anti-Xa Level   0.14 L 


 


ABG pH   


 


ABG pO2   


 


ABG HCO3   


 


ABG O2 Saturation   


 


Oxyhemoglobin   


 


Sodium   


 


Potassium   


 


Chloride   


 


Carbon Dioxide   


 


BUN   


 


Creatinine   


 


Glucose   


 


POC Glucose   


 


Calcium   


 


Phosphorus   


 


Total Bilirubin   


 


AST   


 


ALT   


 


Troponin T  0.126 H*  


 


NT-Pro-B Natriuret Pep   


 


Total Protein   


 


Albumin   


 


LDL Cholesterol Direct   


 


Urine Creatinine   


 


Urine Total Protein   














  05/18/20 05/18/20 05/18/20





  04:45 16:23 16:23


 


WBC   


 


Hct   


 


MCV   


 


MCH   


 


RDW   


 


Plt Count   


 


Lymph % (Auto)   


 


Lymph #   


 


Seg Neutrophils %   


 


Seg Neuts % (Manual)   


 


Lymphocytes % (Manual)   


 


Nucleated RBC %   


 


Seg Neutrophils #   


 


Seg Neutrophils # Man   


 


Lymphocytes # (Manual)   


 


PT   


 


INR   


 


Heparin Anti-Xa Level   0.27 L 


 


ABG pH   


 


ABG pO2   


 


ABG HCO3   


 


ABG O2 Saturation   


 


Oxyhemoglobin   


 


Sodium  127 L   123 L


 


Potassium  5.1 H D  


 


Chloride  83.9 L  


 


Carbon Dioxide  32 H  


 


BUN   


 


Creatinine  1.3 H D  


 


Glucose  116 H  


 


POC Glucose   


 


Calcium  8.3 L  


 


Phosphorus   


 


Total Bilirubin   


 


AST   


 


ALT   


 


Troponin T   


 


NT-Pro-B Natriuret Pep   


 


Total Protein   


 


Albumin   


 


LDL Cholesterol Direct   


 


Urine Creatinine   


 


Urine Total Protein   














  05/18/20 05/19/20 05/19/20





  18:42 09:32 09:32


 


WBC   11.4 H 


 


Hct   


 


MCV   106 H 


 


MCH   33 H 


 


RDW   19.5 H 


 


Plt Count   131 L 


 


Lymph % (Auto)   


 


Lymph #   


 


Seg Neutrophils %   


 


Seg Neuts % (Manual)   


 


Lymphocytes % (Manual)   


 


Nucleated RBC %   


 


Seg Neutrophils #   


 


Seg Neutrophils # Man   


 


Lymphocytes # (Manual)   


 


PT   


 


INR   


 


Heparin Anti-Xa Level   


 


ABG pH   


 


ABG pO2   


 


ABG HCO3   


 


ABG O2 Saturation   


 


Oxyhemoglobin   


 


Sodium    128 L


 


Potassium   


 


Chloride    82.4 L


 


Carbon Dioxide    31 H


 


BUN    25 H


 


Creatinine    1.9 H


 


Glucose    61 L


 


POC Glucose  135 H  


 


Calcium    8.2 L


 


Phosphorus   


 


Total Bilirubin   


 


AST   


 


ALT   


 


Troponin T   


 


NT-Pro-B Natriuret Pep   


 


Total Protein   


 


Albumin   


 


LDL Cholesterol Direct   


 


Urine Creatinine   


 


Urine Total Protein   














  05/19/20 05/19/20 05/19/20





  13:37 19:24 19:33


 


WBC   


 


Hct   


 


MCV   


 


MCH   


 


RDW   


 


Plt Count   


 


Lymph % (Auto)   


 


Lymph #   


 


Seg Neutrophils %   


 


Seg Neuts % (Manual)   


 


Lymphocytes % (Manual)   


 


Nucleated RBC %   


 


Seg Neutrophils #   


 


Seg Neutrophils # Man   


 


Lymphocytes # (Manual)   


 


PT   


 


INR   


 


Heparin Anti-Xa Level  < 0.10 L  


 


ABG pH   


 


ABG pO2   


 


ABG HCO3   


 


ABG O2 Saturation   


 


Oxyhemoglobin   


 


Sodium   


 


Potassium   


 


Chloride   


 


Carbon Dioxide   


 


BUN   


 


Creatinine   


 


Glucose   


 


POC Glucose   < 40 L  > 500 H


 


Calcium   


 


Phosphorus   


 


Total Bilirubin   


 


AST   


 


ALT   


 


Troponin T   


 


NT-Pro-B Natriuret Pep   


 


Total Protein   


 


Albumin   


 


LDL Cholesterol Direct   


 


Urine Creatinine   


 


Urine Total Protein   














  05/19/20 05/19/20 05/19/20





  20:01 20:10 21:03


 


WBC   


 


Hct   


 


MCV   


 


MCH   


 


RDW   


 


Plt Count   


 


Lymph % (Auto)   


 


Lymph #   


 


Seg Neutrophils %   


 


Seg Neuts % (Manual)   


 


Lymphocytes % (Manual)   


 


Nucleated RBC %   


 


Seg Neutrophils #   


 


Seg Neutrophils # Man   


 


Lymphocytes # (Manual)   


 


PT   


 


INR   


 


Heparin Anti-Xa Level  < 0.10 L  


 


ABG pH   


 


ABG pO2   


 


ABG HCO3   


 


ABG O2 Saturation   


 


Oxyhemoglobin   


 


Sodium   124 L 


 


Potassium   5.5 H D 


 


Chloride   82.4 L 


 


Carbon Dioxide   


 


BUN   31 H 


 


Creatinine   2.1 H 


 


Glucose   212 H 


 


POC Glucose    202 H


 


Calcium   8.0 L 


 


Phosphorus   


 


Total Bilirubin   


 


AST   


 


ALT   


 


Troponin T   


 


NT-Pro-B Natriuret Pep   


 


Total Protein   


 


Albumin   


 


LDL Cholesterol Direct   


 


Urine Creatinine   


 


Urine Total Protein   














  05/19/20 05/19/20 05/19/20





  21:53 23:18 Unknown


 


WBC   


 


Hct   


 


MCV   


 


MCH   


 


RDW   


 


Plt Count   


 


Lymph % (Auto)   


 


Lymph #   


 


Seg Neutrophils %   


 


Seg Neuts % (Manual)   


 


Lymphocytes % (Manual)   


 


Nucleated RBC %   


 


Seg Neutrophils #   


 


Seg Neutrophils # Man   


 


Lymphocytes # (Manual)   


 


PT   


 


INR   


 


Heparin Anti-Xa Level   


 


ABG pH   


 


ABG pO2   


 


ABG HCO3   


 


ABG O2 Saturation   


 


Oxyhemoglobin   


 


Sodium   


 


Potassium   


 


Chloride   


 


Carbon Dioxide   


 


BUN   


 


Creatinine   


 


Glucose   


 


POC Glucose  177 H  203 H 


 


Calcium   


 


Phosphorus   


 


Total Bilirubin   


 


AST   


 


ALT   


 


Troponin T   


 


NT-Pro-B Natriuret Pep   


 


Total Protein   


 


Albumin   


 


LDL Cholesterol Direct   


 


Urine Creatinine    216.1 H


 


Urine Total Protein    62 H














  05/20/20 05/20/20 05/20/20





  00:15 02:20 04:34


 


WBC   


 


Hct   


 


MCV   


 


MCH   


 


RDW   


 


Plt Count   


 


Lymph % (Auto)   


 


Lymph #   


 


Seg Neutrophils %   


 


Seg Neuts % (Manual)   


 


Lymphocytes % (Manual)   


 


Nucleated RBC %   


 


Seg Neutrophils #   


 


Seg Neutrophils # Man   


 


Lymphocytes # (Manual)   


 


PT   


 


INR   


 


Heparin Anti-Xa Level   


 


ABG pH   


 


ABG pO2   


 


ABG HCO3   


 


ABG O2 Saturation   


 


Oxyhemoglobin   


 


Sodium    129 L


 


Potassium   


 


Chloride    83.7 L


 


Carbon Dioxide   


 


BUN    36 H


 


Creatinine    2.8 H


 


Glucose    129 H


 


POC Glucose  156 H  149 H 


 


Calcium    7.5 L


 


Phosphorus   


 


Total Bilirubin    2.30 H


 


AST    3636 H


 


ALT    1241 H


 


Troponin T   


 


NT-Pro-B Natriuret Pep   


 


Total Protein   


 


Albumin    3.2 L


 


LDL Cholesterol Direct   


 


Urine Creatinine   


 


Urine Total Protein   














  05/20/20 05/20/20 05/20/20





  04:34 05:19 12:13


 


WBC   


 


Hct   


 


MCV   


 


MCH   


 


RDW   


 


Plt Count   


 


Lymph % (Auto)   


 


Lymph #   


 


Seg Neutrophils %   


 


Seg Neuts % (Manual)   


 


Lymphocytes % (Manual)   


 


Nucleated RBC %   


 


Seg Neutrophils #   


 


Seg Neutrophils # Man   


 


Lymphocytes # (Manual)   


 


PT   


 


INR   


 


Heparin Anti-Xa Level  0.10 L  


 


ABG pH   


 


ABG pO2   


 


ABG HCO3   


 


ABG O2 Saturation   


 


Oxyhemoglobin   


 


Sodium   


 


Potassium   


 


Chloride   


 


Carbon Dioxide   


 


BUN   


 


Creatinine   


 


Glucose   


 


POC Glucose   115 H  69 L


 


Calcium   


 


Phosphorus   


 


Total Bilirubin   


 


AST   


 


ALT   


 


Troponin T   


 


NT-Pro-B Natriuret Pep   


 


Total Protein   


 


Albumin   


 


LDL Cholesterol Direct   


 


Urine Creatinine   


 


Urine Total Protein   














  05/20/20 05/20/20 05/20/20





  15:15 17:37 18:44


 


WBC   


 


Hct   


 


MCV   


 


MCH   


 


RDW   


 


Plt Count   


 


Lymph % (Auto)   


 


Lymph #   


 


Seg Neutrophils %   


 


Seg Neuts % (Manual)   


 


Lymphocytes % (Manual)   


 


Nucleated RBC %   


 


Seg Neutrophils #   


 


Seg Neutrophils # Man   


 


Lymphocytes # (Manual)   


 


PT   


 


INR   


 


Heparin Anti-Xa Level   


 


ABG pH  7.180 L*  


 


ABG pO2  70.1 L  


 


ABG HCO3  31.5 H  


 


ABG O2 Saturation  89.6 L  


 


Oxyhemoglobin  87.7 L  


 


Sodium   


 


Potassium   


 


Chloride   


 


Carbon Dioxide   


 


BUN   


 


Creatinine   


 


Glucose   


 


POC Glucose   68 L  160 H


 


Calcium   


 


Phosphorus   


 


Total Bilirubin   


 


AST   


 


ALT   


 


Troponin T   


 


NT-Pro-B Natriuret Pep   


 


Total Protein   


 


Albumin   


 


LDL Cholesterol Direct   


 


Urine Creatinine   


 


Urine Total Protein   














  05/20/20 05/20/20 05/20/20





  20:16 21:00 Unknown


 


WBC   


 


Hct   


 


MCV   


 


MCH   


 


RDW   


 


Plt Count   


 


Lymph % (Auto)   


 


Lymph #   


 


Seg Neutrophils %   


 


Seg Neuts % (Manual)   


 


Lymphocytes % (Manual)   


 


Nucleated RBC %   


 


Seg Neutrophils #   


 


Seg Neutrophils # Man   


 


Lymphocytes # (Manual)   


 


PT   


 


INR   


 


Heparin Anti-Xa Level   0.20 L  0.12 L


 


ABG pH  7.184 L*  


 


ABG pO2  77.9 L  


 


ABG HCO3  30.5 H  


 


ABG O2 Saturation  92.8 L  


 


Oxyhemoglobin  90.7 L  


 


Sodium   


 


Potassium   


 


Chloride   


 


Carbon Dioxide   


 


BUN   


 


Creatinine   


 


Glucose   


 


POC Glucose   


 


Calcium   


 


Phosphorus   


 


Total Bilirubin   


 


AST   


 


ALT   


 


Troponin T   


 


NT-Pro-B Natriuret Pep   


 


Total Protein   


 


Albumin   


 


LDL Cholesterol Direct   


 


Urine Creatinine   


 


Urine Total Protein   














  05/21/20 05/21/20 05/21/20





  00:06 00:08 05:35


 


WBC    15.0 H


 


Hct   


 


MCV    107 H


 


MCH    34 H


 


RDW    19.5 H


 


Plt Count    56 L


 


Lymph % (Auto)   


 


Lymph #   


 


Seg Neutrophils %   


 


Seg Neuts % (Manual)    92.0 H


 


Lymphocytes % (Manual)    5.0 L


 


Nucleated RBC %    4.0 H


 


Seg Neutrophils #   


 


Seg Neutrophils # Man    0.0 L


 


Lymphocytes # (Manual)    0.0 L


 


PT   


 


INR   


 


Heparin Anti-Xa Level   


 


ABG pH   7.230 L 


 


ABG pO2   79.8 L 


 


ABG HCO3   29.2 H 


 


ABG O2 Saturation   94.3 L 


 


Oxyhemoglobin   92.3 L 


 


Sodium   


 


Potassium   


 


Chloride   


 


Carbon Dioxide   


 


BUN   


 


Creatinine   


 


Glucose   


 


POC Glucose  185 H  


 


Calcium   


 


Phosphorus   


 


Total Bilirubin   


 


AST   


 


ALT   


 


Troponin T   


 


NT-Pro-B Natriuret Pep   


 


Total Protein   


 


Albumin   


 


LDL Cholesterol Direct   


 


Urine Creatinine   


 


Urine Total Protein   














  05/21/20 05/21/20 05/21/20





  05:35 06:07 12:33


 


WBC   


 


Hct   


 


MCV   


 


MCH   


 


RDW   


 


Plt Count   


 


Lymph % (Auto)   


 


Lymph #   


 


Seg Neutrophils %   


 


Seg Neuts % (Manual)   


 


Lymphocytes % (Manual)   


 


Nucleated RBC %   


 


Seg Neutrophils #   


 


Seg Neutrophils # Man   


 


Lymphocytes # (Manual)   


 


PT   


 


INR   


 


Heparin Anti-Xa Level   


 


ABG pH   


 


ABG pO2   


 


ABG HCO3   


 


ABG O2 Saturation   


 


Oxyhemoglobin   


 


Sodium  130 L  


 


Potassium   


 


Chloride  85.2 L  


 


Carbon Dioxide   


 


BUN  49 H  


 


Creatinine  3.4 H  


 


Glucose  157 H  


 


POC Glucose   158 H  170 H


 


Calcium  6.7 L  


 


Phosphorus  7.00 H  


 


Total Bilirubin  2.10 H  


 


AST  2625 H  


 


ALT  1547 H  


 


Troponin T   


 


NT-Pro-B Natriuret Pep   


 


Total Protein  6.2 L  


 


Albumin  3.3 L  


 


LDL Cholesterol Direct   


 


Urine Creatinine   


 


Urine Total Protein   














  05/21/20 05/22/20 05/22/20





  18:26 00:26 04:30


 


WBC    12.3 H


 


Hct    43.6 H


 


MCV    104 H


 


MCH    33 H


 


RDW    20.4 H


 


Plt Count    44 L


 


Lymph % (Auto)   


 


Lymph #   


 


Seg Neutrophils %   


 


Seg Neuts % (Manual)    89.0 H


 


Lymphocytes % (Manual)    7.0 L


 


Nucleated RBC %   


 


Seg Neutrophils #   


 


Seg Neutrophils # Man    10.9 H


 


Lymphocytes # (Manual)    0.9 L


 


PT   


 


INR   


 


Heparin Anti-Xa Level   


 


ABG pH   


 


ABG pO2   


 


ABG HCO3   


 


ABG O2 Saturation   


 


Oxyhemoglobin   


 


Sodium   


 


Potassium   


 


Chloride   


 


Carbon Dioxide   


 


BUN   


 


Creatinine   


 


Glucose   


 


POC Glucose  172 H  171 H 


 


Calcium   


 


Phosphorus   


 


Total Bilirubin   


 


AST   


 


ALT   


 


Troponin T   


 


NT-Pro-B Natriuret Pep   


 


Total Protein   


 


Albumin   


 


LDL Cholesterol Direct   


 


Urine Creatinine   


 


Urine Total Protein   














  05/22/20 05/22/20 05/22/20





  04:30 06:16 15:45


 


WBC   


 


Hct   


 


MCV   


 


MCH   


 


RDW   


 


Plt Count   


 


Lymph % (Auto)   


 


Lymph #   


 


Seg Neutrophils %   


 


Seg Neuts % (Manual)   


 


Lymphocytes % (Manual)   


 


Nucleated RBC %   


 


Seg Neutrophils #   


 


Seg Neutrophils # Man   


 


Lymphocytes # (Manual)   


 


PT   


 


INR   


 


Heparin Anti-Xa Level   


 


ABG pH    7.310 L


 


ABG pO2    73.7 L


 


ABG HCO3    27.2 H


 


ABG O2 Saturation    94.2 L


 


Oxyhemoglobin    92.1 L


 


Sodium  132 L  


 


Potassium   


 


Chloride  88.4 L  


 


Carbon Dioxide   


 


BUN  61 H  


 


Creatinine  2.9 H  


 


Glucose  168 H  


 


POC Glucose   179 H 


 


Calcium  6.9 L  


 


Phosphorus   


 


Total Bilirubin  1.80 H  


 


AST  967 H  


 


ALT  1094 H  


 


Troponin T   


 


NT-Pro-B Natriuret Pep   


 


Total Protein  6.1 L  


 


Albumin  2.9 L  


 


LDL Cholesterol Direct   


 


Urine Creatinine   


 


Urine Total Protein   














  05/23/20 05/23/20 05/23/20





  00:26 03:30 05:15


 


WBC   


 


Hct    43.9 H


 


MCV    105 H


 


MCH    34 H


 


RDW    20.2 H


 


Plt Count    30 L


 


Lymph % (Auto)    5.2 L


 


Lymph #    0.5 L


 


Seg Neutrophils %    89.7 H


 


Seg Neuts % (Manual)   


 


Lymphocytes % (Manual)   


 


Nucleated RBC %   


 


Seg Neutrophils #    9.4 H


 


Seg Neutrophils # Man   


 


Lymphocytes # (Manual)   


 


PT   


 


INR   


 


Heparin Anti-Xa Level   


 


ABG pH   7.317 L 


 


ABG pO2   73.4 L 


 


ABG HCO3   27.6 H 


 


ABG O2 Saturation   94.3 L 


 


Oxyhemoglobin   92.3 L 


 


Sodium   


 


Potassium   


 


Chloride   


 


Carbon Dioxide   


 


BUN   


 


Creatinine   


 


Glucose   


 


POC Glucose  204 H  


 


Calcium   


 


Phosphorus   


 


Total Bilirubin   


 


AST   


 


ALT   


 


Troponin T   


 


NT-Pro-B Natriuret Pep   


 


Total Protein   


 


Albumin   


 


LDL Cholesterol Direct   


 


Urine Creatinine   


 


Urine Total Protein   














  05/23/20 05/24/20





  05:15 03:15


 


WBC  


 


Hct  


 


MCV  


 


MCH  


 


RDW  


 


Plt Count  


 


Lymph % (Auto)  


 


Lymph #  


 


Seg Neutrophils %  


 


Seg Neuts % (Manual)  


 


Lymphocytes % (Manual)  


 


Nucleated RBC %  


 


Seg Neutrophils #  


 


Seg Neutrophils # Man  


 


Lymphocytes # (Manual)  


 


PT  


 


INR  


 


Heparin Anti-Xa Level  


 


ABG pH  


 


ABG pO2  


 


ABG HCO3  


 


ABG O2 Saturation  


 


Oxyhemoglobin  


 


Sodium  136 L  135 L


 


Potassium  


 


Chloride  93.1 L  93.5 L


 


Carbon Dioxide  


 


BUN  65 H  73 H


 


Creatinine  2.3 H  2.2 H


 


Glucose  148 H  128 H


 


POC Glucose  


 


Calcium  7.5 L  8.2 L


 


Phosphorus  


 


Total Bilirubin  1.80 H  1.60 H


 


AST  322 H  159 H


 


ALT  723 H  514 H


 


Troponin T  


 


NT-Pro-B Natriuret Pep  


 


Total Protein  6.0 L  5.7 L


 


Albumin  2.9 L  2.7 L


 


LDL Cholesterol Direct  


 


Urine Creatinine  


 


Urine Total Protein  











Chest x-ray: image reviewed


Allied health notes reviewed: RT

## 2020-05-25 LAB
ALBUMIN SERPL-MCNC: 2.9 G/DL (ref 3.9–5)
ALT SERPL-CCNC: 362 UNITS/L (ref 7–56)
BUN SERPL-MCNC: 91 MG/DL (ref 7–17)
BUN/CREAT SERPL: 40 %
CALCIUM SERPL-MCNC: 8.5 MG/DL (ref 8.4–10.2)
HCT VFR BLD CALC: 35.2 % (ref 30.3–42.9)
HEMOLYSIS INDEX: 27
HGB BLD-MCNC: 11.3 GM/DL (ref 10.1–14.3)
PLATELET # BLD: 25 K/MM3 (ref 140–440)

## 2020-05-25 PROCEDURE — 5A09357 ASSISTANCE WITH RESPIRATORY VENTILATION, LESS THAN 24 CONSECUTIVE HOURS, CONTINUOUS POSITIVE AIRWAY PRESSURE: ICD-10-PCS | Performed by: INTERNAL MEDICINE

## 2020-05-25 RX ADMIN — BUDESONIDE SCH MG: 0.5 INHALANT RESPIRATORY (INHALATION) at 19:40

## 2020-05-25 RX ADMIN — ASPIRIN SCH MG: 325 TABLET, COATED ORAL at 10:16

## 2020-05-25 RX ADMIN — IPRATROPIUM BROMIDE AND ALBUTEROL SULFATE SCH: .5; 3 SOLUTION RESPIRATORY (INHALATION) at 07:58

## 2020-05-25 RX ADMIN — Medication SCH ML: at 10:17

## 2020-05-25 RX ADMIN — BUDESONIDE SCH MG: 0.5 INHALANT RESPIRATORY (INHALATION) at 07:56

## 2020-05-25 RX ADMIN — METHYLPREDNISOLONE SODIUM SUCCINATE SCH MG: 40 INJECTION, POWDER, FOR SOLUTION INTRAMUSCULAR; INTRAVENOUS at 22:07

## 2020-05-25 RX ADMIN — Medication SCH ML: at 22:07

## 2020-05-25 RX ADMIN — ARFORMOTEROL TARTRATE SCH MCG: 15 SOLUTION RESPIRATORY (INHALATION) at 07:56

## 2020-05-25 RX ADMIN — FAMOTIDINE SCH MG: 20 TABLET ORAL at 10:16

## 2020-05-25 RX ADMIN — METHYLPREDNISOLONE SODIUM SUCCINATE SCH MG: 40 INJECTION, POWDER, FOR SOLUTION INTRAMUSCULAR; INTRAVENOUS at 10:16

## 2020-05-25 RX ADMIN — ARFORMOTEROL TARTRATE SCH MCG: 15 SOLUTION RESPIRATORY (INHALATION) at 19:40

## 2020-05-25 RX ADMIN — IPRATROPIUM BROMIDE AND ALBUTEROL SULFATE SCH: .5; 3 SOLUTION RESPIRATORY (INHALATION) at 13:02

## 2020-05-25 RX ADMIN — IPRATROPIUM BROMIDE AND ALBUTEROL SULFATE SCH AMPUL: .5; 3 SOLUTION RESPIRATORY (INHALATION) at 19:40

## 2020-05-25 NOTE — PROGRESS NOTE
Assessment and Plan





This is a 70 year old woman with CHF, COPD who presents with shortness of 

breath, chest pain now with MILAN, hyponatremia





# Acute Kidney Injury:  creatinine 0.7->1.3->1.9->2.8->3.4->2.9->2.3 as of this 

AM with renal function stable past 2-3 days.  Suspect this is related to tubular

injury in setting of hypotension, with changes due to fluid shifting with 

diuretic therapy and cardiorenal physiology.  Off sotalol. Continue to be 

cautious with diuretic therapy and defer to pulm/cardiology to use prn based on 

respiratory status.  No indication for renal replacement therapy at this time, 

will follow closely given clinical status; is at high risk for requiring renal 

replacement therapy but stable with improved MAP.


- daily renal labs


- avoid nephrotoxins


- low salt diet


- strict Is/Os


- maintain MAP >70, continue pressors prn 


- supportive measures per ICU





# Azotemia: likely due to MILAN and steroid use; may have signs of uremia given 

confusion but no indication for renal replacement therapy with improving 

creatinine, reasonable urine output (1L)





# Hyponatremia: suspect hyponatremia related to volume overload, stable at 135. 

Ok to use furosemide for volume prn as noted above; furosemide should promote 

more water loss than sodium, but still need to monitor closely for worsening of 

hyponatremia





# Respiratory Acidosis: suspect related to underlying pulmonary disease; 

improved now





# NSTEMI/ CHF/ atrial flutter: appreciate cardiology input





# Shortness of Breath, Respiratory Distress, Pulmonary Hypertension, COPD: 

appreciate pulmonary input





# Anemia of chronic disease 





# Transaminitis





Thank you for this interesting consult; we will continue to follow closely with 

a guarded renal prognosis.











Subjective


Date of service: 05/25/20


Principal diagnosis: Ac hypoxemic and hypercapnic resp failure; AE-COPD; NSTEMI;

MILAN; HFrEF


Interval history: 





Remains in ICU, now on NC, off pressors.  Remains altered this afternoon





Objective





- Exam


Narrative Exam: 





Constitutional: no acute distress, laying in bed comfortably, minimally 

responsive


Head: NC/AT


Neck: supple


Lungs: coarse breath sounds


CV: RRR, no M/R/G


Abdomen: soft, non-tender, bowel sounds present


Back: nontender


Extremities: no edema, pulses WNL


Skin: intact


Neuro: confused, no clear thoughts noted





- Vital Signs


Vital signs: 


                               Vital Signs - 12hr











  05/25/20 05/25/20 05/25/20





  04:00 04:01 04:37


 


Temperature 97.6 F  


 


Pulse Rate 105 H 95 H 


 


Pulse Rate [   





Bilateral   





Throughout]   


 


Pulse Rate [ 105 H  





From Monitor]   


 


Respiratory 26 H 20 





Rate   


 


Respiratory   





Rate [Bilateral   





Throughout]   


 


Blood Pressure  77/55 


 


O2 Sat by Pulse 91 93 93





Oximetry   














  05/25/20 05/25/20 05/25/20





  04:40 05:01 06:01


 


Temperature   


 


Pulse Rate 105 H 99 H 91 H


 


Pulse Rate [   





Bilateral   





Throughout]   


 


Pulse Rate [   





From Monitor]   


 


Respiratory 26 H 24 24





Rate   


 


Respiratory   





Rate [Bilateral   





Throughout]   


 


Blood Pressure  92/66 97/70


 


O2 Sat by Pulse 93 93 93





Oximetry   














  05/25/20 05/25/20 05/25/20





  07:01 07:58 08:00


 


Temperature   97.8 F


 


Pulse Rate 117 H  86


 


Pulse Rate [  86 





Bilateral   





Throughout]   


 


Pulse Rate [   





From Monitor]   


 


Respiratory 21  20





Rate   


 


Respiratory  18 





Rate [Bilateral   





Throughout]   


 


Blood Pressure 92/66  102/68


 


O2 Sat by Pulse 94 92 91





Oximetry   














  05/25/20 05/25/20 05/25/20





  09:00 10:00 10:16


 


Temperature   


 


Pulse Rate 79 100 H 


 


Pulse Rate [   





Bilateral   





Throughout]   


 


Pulse Rate [   78





From Monitor]   


 


Respiratory 21 24 20





Rate   


 


Respiratory   





Rate [Bilateral   





Throughout]   


 


Blood Pressure 93/64 93/64 


 


O2 Sat by Pulse 94 91 93





Oximetry   














  05/25/20 05/25/20 05/25/20





  11:00 12:00 13:01


 


Temperature  98.2 F 


 


Pulse Rate 110 H 103 H 


 


Pulse Rate [   





Bilateral   





Throughout]   


 


Pulse Rate [  103 H 





From Monitor]   


 


Respiratory 22 22 





Rate   


 


Respiratory   





Rate [Bilateral   





Throughout]   


 


Blood Pressure 106/60  


 


O2 Sat by Pulse 90 91 92





Oximetry   














- Lab





                                 05/25/20 04:23





                                 05/25/20 04:23


                             Most recent lab results











ABG pH  7.317 pH Units (7.350-7.450)  L  05/23/20  03:30    


 


ABG pCO2  55.1 mm Hg  05/23/20  03:30    


 


ABG pO2  73.4 mm Hg (80.0-90.0)  L  05/23/20  03:30    


 


ABG HCO3  27.6 mmol/L (20.0-26.0)  H  05/23/20  03:30    


 


ABG O2 Saturation  94.3 % (95.0-99.0)  L  05/23/20  03:30    


 


Calcium  8.5 mg/dL (8.4-10.2)   05/25/20  04:23    


 


Phosphorus  7.00 mg/dL (2.5-4.5)  H  05/21/20  05:35    


 


Magnesium  1.80 mg/dL (1.7-2.3)   05/23/20  05:15    


 


Urine Creatinine  216.1 mg/dL (0.1-20.0)  H  05/19/20  Unknown


 


Urine Sodium  10 mmol/L  05/19/20  Unknown


 


Urine Total Protein  62 mg/dL (5-11.8)  H  05/19/20  Unknown














Medications & Allergies





- Medications


Allergies/Adverse Reactions: 


                                    Allergies





No Known Allergies Allergy (Unverified 05/13/18 11:23)


   








Home Medications: 


                                Home Medications











 Medication  Instructions  Recorded  Confirmed  Last Taken  Type


 


No Known Home Medications [No  05/16/20 05/16/20 Unknown History





Reported Home Medications]     











Active Medications: 














Generic Name Dose Route Start Last Admin





  Trade Name Freq  PRN Reason Stop Dose Admin


 


Acetaminophen  650 mg  05/16/20 23:45  05/23/20 21:37





  Tylenol  PO   650 mg





  Q4H PRN   Administration





  Pain MILD(1-3)/Fever >100.5/HA  


 


Albuterol  2.5 mg  05/17/20 14:57 





  Proventil  IH  





  Q4HRT PRN  





  Shortness Of Breath  


 


Albuterol/Ipratropium  1 ampul  05/17/20 20:00  05/25/20 13:02





  Duoneb *Not For Prn Use*  IH   Not Given





  TIDRT AMANDA  


 


Lipase/Protease/Amylase  1 each  05/20/20 15:33 





  Pancrewillie Cloud 10,500 Unit  FEEDTUBE  





  PRN PRN  





  For Clogged Feeding Tube  


 


Arformoterol Tartrate  15 mcg  05/17/20 20:00  05/25/20 07:56





  Brovana Nebu  IH   15 mcg





  Q12HRT AMANDA   Administration


 


Aspirin  325 mg  05/17/20 10:00  05/25/20 10:16





  Ecotrin  PO   325 mg





  QDAY AMANDA   Administration


 


Atorvastatin Calcium  40 mg  05/17/20 22:00  05/24/20 22:40





  Lipitor  PO   40 mg





  QHS AMANDA   Administration


 


Budesonide  0.5 mg  05/17/20 20:00  05/25/20 07:56





  Pulmicort  IH   0.5 mg





  Q12HRT AMANDA   Administration


 


Dextrose  50 ml  05/20/20 17:44 





  D50w (25gm) Syringe  IV  





  Q30MIN PRN  





  Hypoglycemia  





  Protocol  


 


Diltiazem HCl  30 mg  05/17/20 18:00  05/25/20 12:49





  Cardizem  PO   Not Given





  Q6HR [facility]  


 


Famotidine  20 mg  05/25/20 10:00  05/25/20 10:16





  Pepcid  PO   20 mg





  DAILY AMANDA   Administration


 


Magnesium Hydroxide  30 ml  05/16/20 23:45 





  Milk Of Magnesia  PO  





  Q4H PRN  





  Constipation  


 


Methylprednisolone Sodium Succinate  40 mg  05/21/20 10:00  05/25/20 10:16





  Solu-Medrol  IV   40 mg





  Q12HR AMANDA   Administration


 


Morphine Sulfate  2 mg  05/16/20 23:45 





  Morphine  IV  





  Q5MIN PRN  





  Chest Pain unrelieved by NTG  


 


Nitroglycerin  0.4 mg  05/16/20 23:45 





  Nitrostat  SL  





  .Q5MIN PRN  





  Chest Pain  


 


Ondansetron HCl  4 mg  05/16/20 23:45 





  Zofran  IV  





  Q8H PRN  





  Nausea And Vomiting  


 


Simple Syrup  15 ml  05/20/20 15:33 





  Simple Syrup  FEEDTUBE  





  PRN PRN  





  Hypoglycemia  


 


Simple Syrup  30 ml  05/20/20 15:33 





  Simple Syrup  FEEDTUBE  





  PRN PRN  





  Hypoglycemia  


 


Sodium Bicarbonate  325 mg  05/20/20 15:33 





  Sodium Bicarbonate  FEEDTUBE  





  PRN PRN  





  For Clogged Feeding Tube  


 


Sodium Chloride  10 ml  05/17/20 10:00  05/25/20 10:17





  Sodium Chloride Flush Syringe 10 Ml  IV   10 ml





  BID AMANDA   Administration


 


Sodium Chloride  10 ml  05/16/20 23:45 





  Sodium Chloride Flush Syringe 10 Ml  IV  





  PRN PRN  





  LINE FLUSH

## 2020-05-25 NOTE — XRAY REPORT
CHEST 1 VIEW, 5/25/2020 2:28 AM 



CLINICAL INFORMATION/INDICATION: Respiratory failure



COMPARISON: Chest radiograph, 5/23/2020 at 2:11 AM



FINDINGS:



SUPPORT DEVICES: Esophagogastric tube projects in similar position, although its distal tip is not we
ll-visualized.

HEART: The cardiac silhouette is mildly enlarged.

LUNGS/PLEURA: Faint bilateral interstitial markings are again noted bibasilar densities. No pneumotho
rax is identified.

ADDITIONAL FINDINGS: No additional acute findings.



IMPRESSION:

1. Mild enlargement of the cardiac silhouette.

2. Interstitial prominence suggestive of pulmonary edema.

3. Bibasilar pleuroparenchymal densities that could be secondary to pleural effusion or atelectasis.



Signer Name: Samra Holden MD 

Signed: 5/25/2020 2:54 AM

Workstation Name: [id number]

## 2020-05-25 NOTE — PROGRESS NOTE
Assessment and Plan








Acute hypoxemic and hypercapnic respiratory failure


COPD


Severe pulmonary hypertension


Hyponatremia


Acute toxic-metabolic encephalaopthy


Tobacco use disorder/Nicotine dependence


Non-ST elevation MI 


Acute on chronic congestive heart failure-systolic, EF 20-25%


Acute kidney injury secondary to vasomotor nephropathy and diuresis


Bilateral lower extremity edema


Anemia of chronic disease 


Morbid obesity





- transition to qhs BIPAP as tolerated


- azotemia worsening and may be headed towards dialysis re: UF


- will consider 72 hours on inotropes to see if azotemia improves


- watch for signs of bleeding


- continue to avoid heparinoids


- follow HIT assay report


- continue care as below otherwise





- continue enteral nutrition as tolerated


- continue aspiration precautions


- continue supplemental oxygen as needed to keep O2 sat's > 92%


- continue bronchodilators (SAIDA & LABA) with pulmonary hygiene per RT


- continue systemic steroids with taper


- continue inhaled corticosteroids


- empiric CAP AB's therapy with Levaquin


- PT/OT as tolerated


- mobility protocols for pressure ulcer prophylaxis


- accuchecks with glycemic control per SSI for target BG < 140-180 mg/dl


- soft restraints as pulling at NGT 


- NSTEMI per cardiology team


- Avoid nephrotoxins, adjust all medications for GFR and CrCL 


- heparin stopped re: thrombocytopenia


- HIT assay ordered


- ACS work-up & optimizatio of CHF management per cardiology


- wean levophed for target MAP >/= 65 mmHg


- mobility protocols for pressure ulcer prophylaxis


- avoid benzodiazepines acute re: delirium issues


- prn analgesia per CPOT score


- continue GI & VTE prophylaxis


- Flu & pneumovax addressed per protocol


- continue other care per attending / other consultants





.. re-evaluate in am & prn





CONDITION- CRITICAL


PROGNOSIS- GUARDED


CODE STATUS- FULL CODE





Life threatening condition: Severe pulmonary HTN, acute and chronic  hypoxic 

respiratory failure on high flow oxygen, acute on chronic systolic heart 

failure, hyponatremia, symptomatic hypoglycemia


Morbidity/Mortality: High


complexity of medical decision making: High





The high probability of a clinically significant, sudden or life-threatening 

deterioration of the [respiratory, cardiovascular,neurologic, renal] system(s) 

required my full and direct attention, intervention and personal management. The

aggregate critical care time was [35] minutes without overlap. Time includes sp

ent on;





[x] Data Review and interpretation 





[x] Patient assessment and monitoring of vital signs 





[x] Documentation 





[x] Medication orders and management








Subjective


Date of service: 05/25/20


Principal diagnosis: Ac hypoxemic and hypercapnic resp failure; AE-COPD; NSTEMI;

 MILAN; HFrEF


Interval history: 





Patient is seen today for: Acute hypoxemic and hypercapnic respiratory failure; 

AE-COPD; Severe Pulm HTN; Acute toxic-metabolic encephalaopthy; NSTEMI; MILAN; 

HFrEF





Seen and examined at bedside; 24hour events reviewed; nursing and respiratory 

care staff consulted; no adverse overnight events reported to me; resting 

peacefully in bed; remains somnoolent to lethargic; tolerating daytime HFNC; no 

emesis or overt aspiration





Objective


                               Vital Signs - 12hr











  05/25/20 05/25/20 05/25/20





  01:12 02:01 03:00


 


Temperature   


 


Pulse Rate  88 83


 


Pulse Rate [   





Bilateral   





Throughout]   


 


Pulse Rate [   





From Monitor]   


 


Respiratory  24 24





Rate   


 


Respiratory   





Rate [Bilateral   





Throughout]   


 


Blood Pressure 99/73 102/66 93/61


 


O2 Sat by Pulse  92 93





Oximetry   














  05/25/20 05/25/20 05/25/20





  04:00 04:01 04:37


 


Temperature 97.6 F  


 


Pulse Rate 105 H 95 H 


 


Pulse Rate [   





Bilateral   





Throughout]   


 


Pulse Rate [ 105 H  





From Monitor]   


 


Respiratory 26 H 20 





Rate   


 


Respiratory   





Rate [Bilateral   





Throughout]   


 


Blood Pressure  77/55 


 


O2 Sat by Pulse 91 93 93





Oximetry   














  05/25/20 05/25/20 05/25/20





  04:40 05:01 06:01


 


Temperature   


 


Pulse Rate 105 H 99 H 91 H


 


Pulse Rate [   





Bilateral   





Throughout]   


 


Pulse Rate [   





From Monitor]   


 


Respiratory 26 H 24 24





Rate   


 


Respiratory   





Rate [Bilateral   





Throughout]   


 


Blood Pressure  92/66 97/70


 


O2 Sat by Pulse 93 93 93





Oximetry   














  05/25/20 05/25/20 05/25/20





  07:01 07:58 08:00


 


Temperature   97.8 F


 


Pulse Rate 117 H  86


 


Pulse Rate [  86 





Bilateral   





Throughout]   


 


Pulse Rate [   





From Monitor]   


 


Respiratory 21  20





Rate   


 


Respiratory  18 





Rate [Bilateral   





Throughout]   


 


Blood Pressure 92/66  102/68


 


O2 Sat by Pulse 94 92 91





Oximetry   














  05/25/20 05/25/20 05/25/20





  09:00 10:00 10:16


 


Temperature   


 


Pulse Rate 79 100 H 


 


Pulse Rate [   





Bilateral   





Throughout]   


 


Pulse Rate [   78





From Monitor]   


 


Respiratory 21 24 20





Rate   


 


Respiratory   





Rate [Bilateral   





Throughout]   


 


Blood Pressure 93/64 93/64 


 


O2 Sat by Pulse 94 91 93





Oximetry   














  05/25/20 05/25/20 05/25/20





  11:00 12:00 13:01


 


Temperature   


 


Pulse Rate 110 H 103 H 


 


Pulse Rate [   





Bilateral   





Throughout]   


 


Pulse Rate [  103 H 





From Monitor]   


 


Respiratory 22 22 





Rate   


 


Respiratory   





Rate [Bilateral   





Throughout]   


 


Blood Pressure 106/60  


 


O2 Sat by Pulse 90 91 92





Oximetry   











Constitutional: appears uncomfortable, other (elderly obese female with mildly 

increased respiratory effort at rest)


Eyes: non-icteric


ENT: oropharynx dry


Neck: supple, no lymphadenopathy


Effort: mildly labored


Ascultation: Bilateral: diminished breath sounds (poor AE bilaterally), rhonchi,

other (prolonged expiratory phase)


Percussion: Bilateral: not dull


Cardiovascular: regular rate and rhythm, other (irrreggular, S1,S2)


Gastrointestinal: normoactive bowel sounds, soft, non-tender, non-distended


Integumentary: rash, other (lower extemity edema +)


Extremities: no cyanosis, pink and warm, pulses normal, no ischemia or 

petechiae, edema


Neurologic: non-focal exam (moves all extemities), pupils equal and round, CN 

II-XII normal, other (lethargic)


Psychiatric: other (unable to assess secondary to mental status)


CBC and BMP: 


                                 05/25/20 04:23





                                 05/26/20 10:04


ABG, PT/INR, D-dimer: 


ABG











ABG pH  7.317 pH Units (7.350-7.450)  L  05/23/20  03:30    


 


ABG pCO2  55.1 mm Hg  05/23/20  03:30    


 


ABG pO2  73.4 mm Hg (80.0-90.0)  L  05/23/20  03:30    


 


ABG O2 Saturation  94.3 % (95.0-99.0)  L  05/23/20  03:30    





PT/INR, D-dimer











PT  15.3 Sec. (12.2-14.9)  H  05/17/20  03:25    


 


INR  1.20  (0.87-1.13)  H  05/17/20  03:25    








Abnormal lab findings: 


                                  Abnormal Labs











  05/16/20 05/16/20 05/17/20





  22:13 22:13 00:00


 


WBC   


 


Hct  43.2 H   45.8 H


 


MCV  107 H   106 H


 


MCH  34 H   33 H


 


RDW  20.2 H   19.7 H


 


Plt Count   


 


Lymph % (Auto)   


 


Lymph #   


 


Seg Neutrophils %  77.3 H   78.8 H


 


Seg Neuts % (Manual)   


 


Lymphocytes % (Manual)   


 


Nucleated RBC %   


 


Seg Neutrophils #   


 


Seg Neutrophils # Man   


 


Lymphocytes # (Manual)   


 


PT   


 


INR   


 


Heparin Anti-Xa Level   


 


ABG pH   


 


ABG pO2   


 


ABG HCO3   


 


ABG O2 Saturation   


 


Oxyhemoglobin   


 


Sodium   130 L 


 


Potassium   


 


Chloride   85.0 L 


 


Carbon Dioxide   35 H 


 


BUN   


 


Creatinine   


 


Glucose   112 H 


 


POC Glucose   


 


Calcium   


 


Phosphorus   


 


Total Bilirubin   


 


AST   


 


ALT   5 L 


 


Troponin T   


 


NT-Pro-B Natriuret Pep   3719 H 


 


Total Protein   


 


Albumin   3.4 L 


 


LDL Cholesterol Direct   


 


Urine Creatinine   


 


Urine Total Protein   














  05/17/20 05/17/20 05/17/20





  00:00 00:12 03:25


 


WBC   


 


Hct   


 


MCV   


 


MCH   


 


RDW   


 


Plt Count   


 


Lymph % (Auto)   


 


Lymph #   


 


Seg Neutrophils %   


 


Seg Neuts % (Manual)   


 


Lymphocytes % (Manual)   


 


Nucleated RBC %   


 


Seg Neutrophils #   


 


Seg Neutrophils # Man   


 


Lymphocytes # (Manual)   


 


PT   


 


INR   


 


Heparin Anti-Xa Level   


 


ABG pH   


 


ABG pO2   


 


ABG HCO3   


 


ABG O2 Saturation   


 


Oxyhemoglobin   


 


Sodium  131 L  


 


Potassium   


 


Chloride  85.3 L  


 


Carbon Dioxide  35 H  


 


BUN   


 


Creatinine   


 


Glucose  108 H  


 


POC Glucose   


 


Calcium   


 


Phosphorus   


 


Total Bilirubin   


 


AST   


 


ALT   


 


Troponin T   0.051 H D  0.113 H* D


 


NT-Pro-B Natriuret Pep   


 


Total Protein   


 


Albumin   


 


LDL Cholesterol Direct   33 L 


 


Urine Creatinine   


 


Urine Total Protein   














  05/17/20 05/17/20 05/17/20





  03:25 03:25 03:25


 


WBC   


 


Hct   


 


MCV  107 H  


 


MCH  34 H  


 


RDW  20.1 H  


 


Plt Count   


 


Lymph % (Auto)   


 


Lymph #   


 


Seg Neutrophils %  77.0 H  


 


Seg Neuts % (Manual)   


 


Lymphocytes % (Manual)   


 


Nucleated RBC %   


 


Seg Neutrophils #   


 


Seg Neutrophils # Man   


 


Lymphocytes # (Manual)   


 


PT   15.3 H 


 


INR   1.20 H 


 


Heparin Anti-Xa Level   


 


ABG pH   


 


ABG pO2   


 


ABG HCO3   


 


ABG O2 Saturation   


 


Oxyhemoglobin   


 


Sodium    133 L


 


Potassium   


 


Chloride    86.1 L


 


Carbon Dioxide    36 H


 


BUN   


 


Creatinine   


 


Glucose    116 H


 


POC Glucose   


 


Calcium   


 


Phosphorus   


 


Total Bilirubin   


 


AST   


 


ALT   


 


Troponin T   


 


NT-Pro-B Natriuret Pep   


 


Total Protein   


 


Albumin   


 


LDL Cholesterol Direct   


 


Urine Creatinine   


 


Urine Total Protein   














  05/17/20 05/17/20 05/18/20





  05:29 13:51 04:45


 


WBC   


 


Hct   


 


MCV    107 H


 


MCH    34 H


 


RDW    20.1 H


 


Plt Count    136 L


 


Lymph % (Auto)   


 


Lymph #   


 


Seg Neutrophils %   


 


Seg Neuts % (Manual)   


 


Lymphocytes % (Manual)   


 


Nucleated RBC %   


 


Seg Neutrophils #   


 


Seg Neutrophils # Man   


 


Lymphocytes # (Manual)   


 


PT   


 


INR   


 


Heparin Anti-Xa Level   0.14 L 


 


ABG pH   


 


ABG pO2   


 


ABG HCO3   


 


ABG O2 Saturation   


 


Oxyhemoglobin   


 


Sodium   


 


Potassium   


 


Chloride   


 


Carbon Dioxide   


 


BUN   


 


Creatinine   


 


Glucose   


 


POC Glucose   


 


Calcium   


 


Phosphorus   


 


Total Bilirubin   


 


AST   


 


ALT   


 


Troponin T  0.126 H*  


 


NT-Pro-B Natriuret Pep   


 


Total Protein   


 


Albumin   


 


LDL Cholesterol Direct   


 


Urine Creatinine   


 


Urine Total Protein   














  05/18/20 05/18/20 05/18/20





  04:45 16:23 16:23


 


WBC   


 


Hct   


 


MCV   


 


MCH   


 


RDW   


 


Plt Count   


 


Lymph % (Auto)   


 


Lymph #   


 


Seg Neutrophils %   


 


Seg Neuts % (Manual)   


 


Lymphocytes % (Manual)   


 


Nucleated RBC %   


 


Seg Neutrophils #   


 


Seg Neutrophils # Man   


 


Lymphocytes # (Manual)   


 


PT   


 


INR   


 


Heparin Anti-Xa Level   0.27 L 


 


ABG pH   


 


ABG pO2   


 


ABG HCO3   


 


ABG O2 Saturation   


 


Oxyhemoglobin   


 


Sodium  127 L   123 L


 


Potassium  5.1 H D  


 


Chloride  83.9 L  


 


Carbon Dioxide  32 H  


 


BUN   


 


Creatinine  1.3 H D  


 


Glucose  116 H  


 


POC Glucose   


 


Calcium  8.3 L  


 


Phosphorus   


 


Total Bilirubin   


 


AST   


 


ALT   


 


Troponin T   


 


NT-Pro-B Natriuret Pep   


 


Total Protein   


 


Albumin   


 


LDL Cholesterol Direct   


 


Urine Creatinine   


 


Urine Total Protein   














  05/18/20 05/19/20 05/19/20





  18:42 09:32 09:32


 


WBC   11.4 H 


 


Hct   


 


MCV   106 H 


 


MCH   33 H 


 


RDW   19.5 H 


 


Plt Count   131 L 


 


Lymph % (Auto)   


 


Lymph #   


 


Seg Neutrophils %   


 


Seg Neuts % (Manual)   


 


Lymphocytes % (Manual)   


 


Nucleated RBC %   


 


Seg Neutrophils #   


 


Seg Neutrophils # Man   


 


Lymphocytes # (Manual)   


 


PT   


 


INR   


 


Heparin Anti-Xa Level   


 


ABG pH   


 


ABG pO2   


 


ABG HCO3   


 


ABG O2 Saturation   


 


Oxyhemoglobin   


 


Sodium    128 L


 


Potassium   


 


Chloride    82.4 L


 


Carbon Dioxide    31 H


 


BUN    25 H


 


Creatinine    1.9 H


 


Glucose    61 L


 


POC Glucose  135 H  


 


Calcium    8.2 L


 


Phosphorus   


 


Total Bilirubin   


 


AST   


 


ALT   


 


Troponin T   


 


NT-Pro-B Natriuret Pep   


 


Total Protein   


 


Albumin   


 


LDL Cholesterol Direct   


 


Urine Creatinine   


 


Urine Total Protein   














  05/19/20 05/19/20 05/19/20





  13:37 19:24 19:33


 


WBC   


 


Hct   


 


MCV   


 


MCH   


 


RDW   


 


Plt Count   


 


Lymph % (Auto)   


 


Lymph #   


 


Seg Neutrophils %   


 


Seg Neuts % (Manual)   


 


Lymphocytes % (Manual)   


 


Nucleated RBC %   


 


Seg Neutrophils #   


 


Seg Neutrophils # Man   


 


Lymphocytes # (Manual)   


 


PT   


 


INR   


 


Heparin Anti-Xa Level  < 0.10 L  


 


ABG pH   


 


ABG pO2   


 


ABG HCO3   


 


ABG O2 Saturation   


 


Oxyhemoglobin   


 


Sodium   


 


Potassium   


 


Chloride   


 


Carbon Dioxide   


 


BUN   


 


Creatinine   


 


Glucose   


 


POC Glucose   < 40 L  > 500 H


 


Calcium   


 


Phosphorus   


 


Total Bilirubin   


 


AST   


 


ALT   


 


Troponin T   


 


NT-Pro-B Natriuret Pep   


 


Total Protein   


 


Albumin   


 


LDL Cholesterol Direct   


 


Urine Creatinine   


 


Urine Total Protein   














  05/19/20 05/19/20 05/19/20





  20:01 20:10 21:03


 


WBC   


 


Hct   


 


MCV   


 


MCH   


 


RDW   


 


Plt Count   


 


Lymph % (Auto)   


 


Lymph #   


 


Seg Neutrophils %   


 


Seg Neuts % (Manual)   


 


Lymphocytes % (Manual)   


 


Nucleated RBC %   


 


Seg Neutrophils #   


 


Seg Neutrophils # Man   


 


Lymphocytes # (Manual)   


 


PT   


 


INR   


 


Heparin Anti-Xa Level  < 0.10 L  


 


ABG pH   


 


ABG pO2   


 


ABG HCO3   


 


ABG O2 Saturation   


 


Oxyhemoglobin   


 


Sodium   124 L 


 


Potassium   5.5 H D 


 


Chloride   82.4 L 


 


Carbon Dioxide   


 


BUN   31 H 


 


Creatinine   2.1 H 


 


Glucose   212 H 


 


POC Glucose    202 H


 


Calcium   8.0 L 


 


Phosphorus   


 


Total Bilirubin   


 


AST   


 


ALT   


 


Troponin T   


 


NT-Pro-B Natriuret Pep   


 


Total Protein   


 


Albumin   


 


LDL Cholesterol Direct   


 


Urine Creatinine   


 


Urine Total Protein   














  05/19/20 05/19/20 05/19/20





  21:53 23:18 Unknown


 


WBC   


 


Hct   


 


MCV   


 


MCH   


 


RDW   


 


Plt Count   


 


Lymph % (Auto)   


 


Lymph #   


 


Seg Neutrophils %   


 


Seg Neuts % (Manual)   


 


Lymphocytes % (Manual)   


 


Nucleated RBC %   


 


Seg Neutrophils #   


 


Seg Neutrophils # Man   


 


Lymphocytes # (Manual)   


 


PT   


 


INR   


 


Heparin Anti-Xa Level   


 


ABG pH   


 


ABG pO2   


 


ABG HCO3   


 


ABG O2 Saturation   


 


Oxyhemoglobin   


 


Sodium   


 


Potassium   


 


Chloride   


 


Carbon Dioxide   


 


BUN   


 


Creatinine   


 


Glucose   


 


POC Glucose  177 H  203 H 


 


Calcium   


 


Phosphorus   


 


Total Bilirubin   


 


AST   


 


ALT   


 


Troponin T   


 


NT-Pro-B Natriuret Pep   


 


Total Protein   


 


Albumin   


 


LDL Cholesterol Direct   


 


Urine Creatinine    216.1 H


 


Urine Total Protein    62 H














  05/20/20 05/20/20 05/20/20





  00:15 02:20 04:34


 


WBC   


 


Hct   


 


MCV   


 


MCH   


 


RDW   


 


Plt Count   


 


Lymph % (Auto)   


 


Lymph #   


 


Seg Neutrophils %   


 


Seg Neuts % (Manual)   


 


Lymphocytes % (Manual)   


 


Nucleated RBC %   


 


Seg Neutrophils #   


 


Seg Neutrophils # Man   


 


Lymphocytes # (Manual)   


 


PT   


 


INR   


 


Heparin Anti-Xa Level   


 


ABG pH   


 


ABG pO2   


 


ABG HCO3   


 


ABG O2 Saturation   


 


Oxyhemoglobin   


 


Sodium    129 L


 


Potassium   


 


Chloride    83.7 L


 


Carbon Dioxide   


 


BUN    36 H


 


Creatinine    2.8 H


 


Glucose    129 H


 


POC Glucose  156 H  149 H 


 


Calcium    7.5 L


 


Phosphorus   


 


Total Bilirubin    2.30 H


 


AST    3636 H


 


ALT    1241 H


 


Troponin T   


 


NT-Pro-B Natriuret Pep   


 


Total Protein   


 


Albumin    3.2 L


 


LDL Cholesterol Direct   


 


Urine Creatinine   


 


Urine Total Protein   














  05/20/20 05/20/20 05/20/20





  04:34 05:19 12:13


 


WBC   


 


Hct   


 


MCV   


 


MCH   


 


RDW   


 


Plt Count   


 


Lymph % (Auto)   


 


Lymph #   


 


Seg Neutrophils %   


 


Seg Neuts % (Manual)   


 


Lymphocytes % (Manual)   


 


Nucleated RBC %   


 


Seg Neutrophils #   


 


Seg Neutrophils # Man   


 


Lymphocytes # (Manual)   


 


PT   


 


INR   


 


Heparin Anti-Xa Level  0.10 L  


 


ABG pH   


 


ABG pO2   


 


ABG HCO3   


 


ABG O2 Saturation   


 


Oxyhemoglobin   


 


Sodium   


 


Potassium   


 


Chloride   


 


Carbon Dioxide   


 


BUN   


 


Creatinine   


 


Glucose   


 


POC Glucose   115 H  69 L


 


Calcium   


 


Phosphorus   


 


Total Bilirubin   


 


AST   


 


ALT   


 


Troponin T   


 


NT-Pro-B Natriuret Pep   


 


Total Protein   


 


Albumin   


 


LDL Cholesterol Direct   


 


Urine Creatinine   


 


Urine Total Protein   














  05/20/20 05/20/20 05/20/20





  15:15 17:37 18:44


 


WBC   


 


Hct   


 


MCV   


 


MCH   


 


RDW   


 


Plt Count   


 


Lymph % (Auto)   


 


Lymph #   


 


Seg Neutrophils %   


 


Seg Neuts % (Manual)   


 


Lymphocytes % (Manual)   


 


Nucleated RBC %   


 


Seg Neutrophils #   


 


Seg Neutrophils # Man   


 


Lymphocytes # (Manual)   


 


PT   


 


INR   


 


Heparin Anti-Xa Level   


 


ABG pH  7.180 L*  


 


ABG pO2  70.1 L  


 


ABG HCO3  31.5 H  


 


ABG O2 Saturation  89.6 L  


 


Oxyhemoglobin  87.7 L  


 


Sodium   


 


Potassium   


 


Chloride   


 


Carbon Dioxide   


 


BUN   


 


Creatinine   


 


Glucose   


 


POC Glucose   68 L  160 H


 


Calcium   


 


Phosphorus   


 


Total Bilirubin   


 


AST   


 


ALT   


 


Troponin T   


 


NT-Pro-B Natriuret Pep   


 


Total Protein   


 


Albumin   


 


LDL Cholesterol Direct   


 


Urine Creatinine   


 


Urine Total Protein   














  05/20/20 05/20/20 05/20/20





  20:16 21:00 Unknown


 


WBC   


 


Hct   


 


MCV   


 


MCH   


 


RDW   


 


Plt Count   


 


Lymph % (Auto)   


 


Lymph #   


 


Seg Neutrophils %   


 


Seg Neuts % (Manual)   


 


Lymphocytes % (Manual)   


 


Nucleated RBC %   


 


Seg Neutrophils #   


 


Seg Neutrophils # Man   


 


Lymphocytes # (Manual)   


 


PT   


 


INR   


 


Heparin Anti-Xa Level   0.20 L  0.12 L


 


ABG pH  7.184 L*  


 


ABG pO2  77.9 L  


 


ABG HCO3  30.5 H  


 


ABG O2 Saturation  92.8 L  


 


Oxyhemoglobin  90.7 L  


 


Sodium   


 


Potassium   


 


Chloride   


 


Carbon Dioxide   


 


BUN   


 


Creatinine   


 


Glucose   


 


POC Glucose   


 


Calcium   


 


Phosphorus   


 


Total Bilirubin   


 


AST   


 


ALT   


 


Troponin T   


 


NT-Pro-B Natriuret Pep   


 


Total Protein   


 


Albumin   


 


LDL Cholesterol Direct   


 


Urine Creatinine   


 


Urine Total Protein   














  05/21/20 05/21/20 05/21/20





  00:06 00:08 05:35


 


WBC    15.0 H


 


Hct   


 


MCV    107 H


 


MCH    34 H


 


RDW    19.5 H


 


Plt Count    56 L


 


Lymph % (Auto)   


 


Lymph #   


 


Seg Neutrophils %   


 


Seg Neuts % (Manual)    92.0 H


 


Lymphocytes % (Manual)    5.0 L


 


Nucleated RBC %    4.0 H


 


Seg Neutrophils #   


 


Seg Neutrophils # Man    0.0 L


 


Lymphocytes # (Manual)    0.0 L


 


PT   


 


INR   


 


Heparin Anti-Xa Level   


 


ABG pH   7.230 L 


 


ABG pO2   79.8 L 


 


ABG HCO3   29.2 H 


 


ABG O2 Saturation   94.3 L 


 


Oxyhemoglobin   92.3 L 


 


Sodium   


 


Potassium   


 


Chloride   


 


Carbon Dioxide   


 


BUN   


 


Creatinine   


 


Glucose   


 


POC Glucose  185 H  


 


Calcium   


 


Phosphorus   


 


Total Bilirubin   


 


AST   


 


ALT   


 


Troponin T   


 


NT-Pro-B Natriuret Pep   


 


Total Protein   


 


Albumin   


 


LDL Cholesterol Direct   


 


Urine Creatinine   


 


Urine Total Protein   














  05/21/20 05/21/20 05/21/20





  05:35 06:07 12:33


 


WBC   


 


Hct   


 


MCV   


 


MCH   


 


RDW   


 


Plt Count   


 


Lymph % (Auto)   


 


Lymph #   


 


Seg Neutrophils %   


 


Seg Neuts % (Manual)   


 


Lymphocytes % (Manual)   


 


Nucleated RBC %   


 


Seg Neutrophils #   


 


Seg Neutrophils # Man   


 


Lymphocytes # (Manual)   


 


PT   


 


INR   


 


Heparin Anti-Xa Level   


 


ABG pH   


 


ABG pO2   


 


ABG HCO3   


 


ABG O2 Saturation   


 


Oxyhemoglobin   


 


Sodium  130 L  


 


Potassium   


 


Chloride  85.2 L  


 


Carbon Dioxide   


 


BUN  49 H  


 


Creatinine  3.4 H  


 


Glucose  157 H  


 


POC Glucose   158 H  170 H


 


Calcium  6.7 L  


 


Phosphorus  7.00 H  


 


Total Bilirubin  2.10 H  


 


AST  2625 H  


 


ALT  1547 H  


 


Troponin T   


 


NT-Pro-B Natriuret Pep   


 


Total Protein  6.2 L  


 


Albumin  3.3 L  


 


LDL Cholesterol Direct   


 


Urine Creatinine   


 


Urine Total Protein   














  05/21/20 05/22/20 05/22/20





  18:26 00:26 04:30


 


WBC    12.3 H


 


Hct    43.6 H


 


MCV    104 H


 


MCH    33 H


 


RDW    20.4 H


 


Plt Count    44 L


 


Lymph % (Auto)   


 


Lymph #   


 


Seg Neutrophils %   


 


Seg Neuts % (Manual)    89.0 H


 


Lymphocytes % (Manual)    7.0 L


 


Nucleated RBC %   


 


Seg Neutrophils #   


 


Seg Neutrophils # Man    10.9 H


 


Lymphocytes # (Manual)    0.9 L


 


PT   


 


INR   


 


Heparin Anti-Xa Level   


 


ABG pH   


 


ABG pO2   


 


ABG HCO3   


 


ABG O2 Saturation   


 


Oxyhemoglobin   


 


Sodium   


 


Potassium   


 


Chloride   


 


Carbon Dioxide   


 


BUN   


 


Creatinine   


 


Glucose   


 


POC Glucose  172 H  171 H 


 


Calcium   


 


Phosphorus   


 


Total Bilirubin   


 


AST   


 


ALT   


 


Troponin T   


 


NT-Pro-B Natriuret Pep   


 


Total Protein   


 


Albumin   


 


LDL Cholesterol Direct   


 


Urine Creatinine   


 


Urine Total Protein   














  05/22/20 05/22/20 05/22/20





  04:30 06:16 15:45


 


WBC   


 


Hct   


 


MCV   


 


MCH   


 


RDW   


 


Plt Count   


 


Lymph % (Auto)   


 


Lymph #   


 


Seg Neutrophils %   


 


Seg Neuts % (Manual)   


 


Lymphocytes % (Manual)   


 


Nucleated RBC %   


 


Seg Neutrophils #   


 


Seg Neutrophils # Man   


 


Lymphocytes # (Manual)   


 


PT   


 


INR   


 


Heparin Anti-Xa Level   


 


ABG pH    7.310 L


 


ABG pO2    73.7 L


 


ABG HCO3    27.2 H


 


ABG O2 Saturation    94.2 L


 


Oxyhemoglobin    92.1 L


 


Sodium  132 L  


 


Potassium   


 


Chloride  88.4 L  


 


Carbon Dioxide   


 


BUN  61 H  


 


Creatinine  2.9 H  


 


Glucose  168 H  


 


POC Glucose   179 H 


 


Calcium  6.9 L  


 


Phosphorus   


 


Total Bilirubin  1.80 H  


 


AST  967 H  


 


ALT  1094 H  


 


Troponin T   


 


NT-Pro-B Natriuret Pep   


 


Total Protein  6.1 L  


 


Albumin  2.9 L  


 


LDL Cholesterol Direct   


 


Urine Creatinine   


 


Urine Total Protein   














  05/23/20 05/23/20 05/23/20





  00:26 03:30 05:15


 


WBC   


 


Hct    43.9 H


 


MCV    105 H


 


MCH    34 H


 


RDW    20.2 H


 


Plt Count    30 L


 


Lymph % (Auto)    5.2 L


 


Lymph #    0.5 L


 


Seg Neutrophils %    89.7 H


 


Seg Neuts % (Manual)   


 


Lymphocytes % (Manual)   


 


Nucleated RBC %   


 


Seg Neutrophils #    9.4 H


 


Seg Neutrophils # Man   


 


Lymphocytes # (Manual)   


 


PT   


 


INR   


 


Heparin Anti-Xa Level   


 


ABG pH   7.317 L 


 


ABG pO2   73.4 L 


 


ABG HCO3   27.6 H 


 


ABG O2 Saturation   94.3 L 


 


Oxyhemoglobin   92.3 L 


 


Sodium   


 


Potassium   


 


Chloride   


 


Carbon Dioxide   


 


BUN   


 


Creatinine   


 


Glucose   


 


POC Glucose  204 H  


 


Calcium   


 


Phosphorus   


 


Total Bilirubin   


 


AST   


 


ALT   


 


Troponin T   


 


NT-Pro-B Natriuret Pep   


 


Total Protein   


 


Albumin   


 


LDL Cholesterol Direct   


 


Urine Creatinine   


 


Urine Total Protein   














  05/23/20 05/24/20 05/24/20





  05:15 03:15 11:51


 


WBC   


 


Hct   


 


MCV   


 


MCH   


 


RDW   


 


Plt Count   


 


Lymph % (Auto)   


 


Lymph #   


 


Seg Neutrophils %   


 


Seg Neuts % (Manual)   


 


Lymphocytes % (Manual)   


 


Nucleated RBC %   


 


Seg Neutrophils #   


 


Seg Neutrophils # Man   


 


Lymphocytes # (Manual)   


 


PT   


 


INR   


 


Heparin Anti-Xa Level   


 


ABG pH   


 


ABG pO2   


 


ABG HCO3   


 


ABG O2 Saturation   


 


Oxyhemoglobin   


 


Sodium  136 L  135 L 


 


Potassium   


 


Chloride  93.1 L  93.5 L 


 


Carbon Dioxide   


 


BUN  65 H  73 H 


 


Creatinine  2.3 H  2.2 H 


 


Glucose  148 H  128 H 


 


POC Glucose    169 H


 


Calcium  7.5 L  8.2 L 


 


Phosphorus   


 


Total Bilirubin  1.80 H  1.60 H 


 


AST  322 H  159 H 


 


ALT  723 H  514 H 


 


Troponin T   


 


NT-Pro-B Natriuret Pep   


 


Total Protein  6.0 L  5.7 L 


 


Albumin  2.9 L  2.7 L 


 


LDL Cholesterol Direct   


 


Urine Creatinine   


 


Urine Total Protein   














  05/24/20 05/25/20 05/25/20





  17:41 04:23 04:23


 


WBC   


 


Hct   


 


MCV   


 


MCH   


 


RDW   


 


Plt Count   25 L 


 


Lymph % (Auto)   


 


Lymph #   


 


Seg Neutrophils %   


 


Seg Neuts % (Manual)   


 


Lymphocytes % (Manual)   


 


Nucleated RBC %   


 


Seg Neutrophils #   


 


Seg Neutrophils # Man   


 


Lymphocytes # (Manual)   


 


PT   


 


INR   


 


Heparin Anti-Xa Level   


 


ABG pH   


 


ABG pO2   


 


ABG HCO3   


 


ABG O2 Saturation   


 


Oxyhemoglobin   


 


Sodium    135 L


 


Potassium   


 


Chloride    94.7 L


 


Carbon Dioxide   


 


BUN    91 H


 


Creatinine    2.3 H


 


Glucose    165 H


 


POC Glucose  158 H  


 


Calcium   


 


Phosphorus   


 


Total Bilirubin   


 


AST    72 H


 


ALT    362 H


 


Troponin T   


 


NT-Pro-B Natriuret Pep   


 


Total Protein    5.9 L


 


Albumin    2.9 L


 


LDL Cholesterol Direct   


 


Urine Creatinine   


 


Urine Total Protein   














  05/25/20





  11:32


 


WBC 


 


Hct 


 


MCV 


 


MCH 


 


RDW 


 


Plt Count 


 


Lymph % (Auto) 


 


Lymph # 


 


Seg Neutrophils % 


 


Seg Neuts % (Manual) 


 


Lymphocytes % (Manual) 


 


Nucleated RBC % 


 


Seg Neutrophils # 


 


Seg Neutrophils # Man 


 


Lymphocytes # (Manual) 


 


PT 


 


INR 


 


Heparin Anti-Xa Level 


 


ABG pH 


 


ABG pO2 


 


ABG HCO3 


 


ABG O2 Saturation 


 


Oxyhemoglobin 


 


Sodium 


 


Potassium 


 


Chloride 


 


Carbon Dioxide 


 


BUN 


 


Creatinine 


 


Glucose 


 


POC Glucose  219 H


 


Calcium 


 


Phosphorus 


 


Total Bilirubin 


 


AST 


 


ALT 


 


Troponin T 


 


NT-Pro-B Natriuret Pep 


 


Total Protein 


 


Albumin 


 


LDL Cholesterol Direct 


 


Urine Creatinine 


 


Urine Total Protein 











CT scan - chest: image reviewed (small right pleural effusion; mild interstitial

edema)


Allied health notes reviewed: nursing

## 2020-05-25 NOTE — PROGRESS NOTE
Assessment and Plan


Assessment and plan: 


5/25 : Patient on high flow oxygen, lethargic


On tube feeding





5/24 : Patient is weaned off BiPAP


On NC, high flow oxygen O2 40 L/min, FiO2 75%, O2 sat 93%


Receiving tube feeding, renal function, LFTs trending down





5/23/2020; patient remains on continuous BiPAP


Receiving tube feeding


Transaminases trending down


Renal function slightly improved





--Metabolic encephalopathy;lethergy


Due to underlying disease process


Treat the underlying cause, supportive care





--Nutrition; tube feeding as patient is lethargic


Speech and swallow evaluation.





--Acute hypoxic respiratory failure; on continuous BiPAP


Titrate O2 sats to more than 90%, nebulizers


IV steroids, pulmonary following,





--Hypotension shock/on vasopressors;


Off vasopressors, reasonable blood pressures





--Severe pulmonary hypertension; on echo


Continue current management,





--Cor pulmonale/severe pulmonary hypertension;


Management per pulmonary





--Acute exacerbation of COPD; on home oxygen


Oxygen, nebulizers, IV steroids.





--Acute on chronic systolic congestive heart failure; EF 20 to 25%


Continue heart failure medications, input output monitoring


Low-sodium diet, fluid restriction, cardiology following





--Atrial flutter with variable conduction;


On Cardizem, chronic anticoagulation per cardiology


Sotalol discontinued by cardiol due to bradycardia 





--Severe thrombocytopenia; Heparin drip discontinued


HIT panel ordered, pending report





--Acute transaminitis; shock liver /to liver congestion


Due to cardiomyopathy.  And hypotension 


Trending down





--Acute kidney injury; vasomotor nephropathy


Management per nephrology, avoid nephrotoxins





--Hyponatremia; mild improvement


Secondary to fluid overload, current management


Diuretic therapy, follow electrolytes, nephrology following





--Symptomatic hypoglycemia; [5/19/2020]


Received D50, D10 IV fluids, significantly improved


Closely monitor blood sugars adjust as needed





--Generalized anasarca/edema


Due to cardiomyopathy, severe pulmonary hypertension


renal failure.  Treat the underlying cause





--Morbid obesity; BMI 38.6


Partly due to fluid overload


Continue supportive care





--DVT prophylaxis;


Patient is on heparin drip





--Full CODE STATUS.





Patient has multiple medical problems


Critically ill, poor prognosis





Plan of care reviewed with the patient'S nurse


Tube feeding per protocol





Critical care time 35 minutes








Brief history:


70-year-old female with known history of COPD tension and CHF presenting was 

admitted through emergency room with lower extremity swelling, chest pain 

Patient is on home oxygen for COPD and she is also on Lasix for CHF.  

Noncompliant with medications .  Admitted with acute hypoxic respiratory failure

 and COPD exacerbation as well as acute on chronic systolic congestive heart 

failure, fluid overload, pulmonary cardiology nephrology following the patient.


Patient was maintained on BiPAP and tube feeding, today BiPAP is weaned off on 

high-dose nasal cannula oxygen.  Had shock liver with LFTs in thousands


Trending down, Patient is obese, encephalopathic severe malnutrition, and 

cachectic with poor prognosis











History


Interval history: 


Patient seen and examined at the bedside in IMCU today


Patient remains lethargic on high flow oxygen


And tube feeding, mild distress


Patient's chart medications, overnight events reviewed


Vital signs noted








Hospitalist Physical





- Constitutional


Vitals: 


                                        











Temp Pulse Resp BP Pulse Ox


 


 97.8 F   78   18   92/66   92 


 


 05/25/20 08:00  05/25/20 08:00  05/25/20 07:58  05/25/20 05:01  05/25/20 07:58











General appearance: Present: mild distress, well-nourished, obese (Morbidly 

obese), other (On high flow NC oxygen, lethergic)





- EENT


Eyes: Present: PERRL, EOM intact





- Neck


Neck: Present: supple, normal ROM





- Respiratory


Respiratory effort: normal


Respiratory: bilateral: diminished, rales, negative: rhonchi, wheezing





- Cardiovascular


Rhythm: regular


Heart Sounds: Present: S1 & S2





- Extremities


Extremities: no ischemia


Extremity abnormal: edema





- Abdominal


General gastrointestinal: soft, non-tender, non-distended, normal bowel sounds





- Integumentary


Integumentary: Present: clear, warm





- Psychiatric


Psychiatric: other (Confused noncommunicative)





- Neurologic


Neurologic: moves all extremities (Confused noncommunicative)





HEART Score





- HEART Score


EKG: Non-specific


Age: > 65


Risk factors: > 3 risk factors or hx of atherosclerotic disease


Troponin: 


                                        











Troponin T  0.126 ng/mL (0.00-0.029)  H*  05/17/20  05:29    











Troponin: < normal limit





- Critical Actions


Critical Actions: 4-6 pts:12-16.6% risk of adverse cardiac event. Should be 

admitted





Results





- Labs


CBC & Chem 7: 


                                 05/25/20 04:23





                                 05/25/20 04:23


Labs: 


                             Laboratory Last Values











WBC  10.5 K/mm3 (4.5-11.0)   05/23/20  05:15    


 


RBC  4.16 M/mm3 (3.65-5.03)   05/23/20  05:15    


 


Hgb  11.3 gm/dl (10.1-14.3)  D 05/25/20  04:23    


 


Hct  35.2 % (30.3-42.9)  D 05/25/20  04:23    


 


MCV  105 fl (79-97)  H  05/23/20  05:15    


 


MCH  34 pg (28-32)  H  05/23/20  05:15    


 


MCHC  33 % (30-34)   05/23/20  05:15    


 


RDW  20.2 % (13.2-15.2)  H  05/23/20  05:15    


 


Plt Count  25 K/mm3 (140-440)  L  05/25/20  04:23    


 


Lymph % (Auto)  5.2 % (13.4-35.0)  L  05/23/20  05:15    


 


Mono % (Auto)  5.0 % (0.0-7.3)   05/23/20  05:15    


 


Eos % (Auto)  0.0 % (0.0-4.3)   05/23/20  05:15    


 


Baso % (Auto)  0.1 % (0.0-1.8)   05/23/20  05:15    


 


Lymph #  0.5 K/mm3 (1.2-5.4)  L  05/23/20  05:15    


 


Mono #  0.5 K/mm3 (0.0-0.8)   05/23/20  05:15    


 


Eos #  0.0 K/mm3 (0.0-0.4)   05/23/20  05:15    


 


Baso #  0.0 K/mm3 (0.0-0.1)   05/23/20  05:15    


 


Add Manual Diff  Complete   05/22/20  04:30    


 


Total Counted  100   05/22/20  04:30    


 


Seg Neutrophils %  89.7 % (40.0-70.0)  H  05/23/20  05:15    


 


Seg Neuts % (Manual)  89.0 % (40.0-70.0)  H  05/22/20  04:30    


 


Band Neutrophils %  1.0 %  05/22/20  04:30    


 


Lymphocytes % (Manual)  7.0 % (13.4-35.0)  L  05/22/20  04:30    


 


Reactive Lymphs % (Man)  0 %  05/22/20  04:30    


 


Monocytes % (Manual)  3.0 % (0.0-7.3)   05/22/20  04:30    


 


Eosinophils % (Manual)  0 % (0.0-4.3)   05/22/20  04:30    


 


Basophils % (Manual)  0 % (0.0-1.8)   05/22/20  04:30    


 


Metamyelocytes %  0 %  05/22/20  04:30    


 


Myelocytes %  0 %  05/22/20  04:30    


 


Promyelocytes %  0 %  05/22/20  04:30    


 


Blast Cells %  0 %  05/22/20  04:30    


 


Nucleated RBC %  Not Reportable   05/22/20  04:30    


 


Seg Neutrophils #  9.4 K/mm3 (1.8-7.7)  H  05/23/20  05:15    


 


Seg Neutrophils # Man  10.9 K/mm3 (1.8-7.7)  H  05/22/20  04:30    


 


Band Neutrophils #  0.1 K/mm3  05/22/20  04:30    


 


Lymphocytes # (Manual)  0.9 K/mm3 (1.2-5.4)  L  05/22/20  04:30    


 


Abs React Lymphs (Man)  0.0 K/mm3  05/22/20  04:30    


 


Monocytes # (Manual)  0.4 K/mm3 (0.0-0.8)   05/22/20  04:30    


 


Eosinophils # (Manual)  0.0 K/mm3 (0.0-0.4)   05/22/20  04:30    


 


Basophils # (Manual)  0.0 K/mm3 (0.0-0.1)   05/22/20  04:30    


 


Metamyelocytes #  0.0 K/mm3  05/22/20  04:30    


 


Myelocytes #  0.0 K/mm3  05/22/20  04:30    


 


Promyelocytes #  0.0 K/mm3  05/22/20  04:30    


 


Blast Cells #  0.0 K/mm3  05/22/20  04:30    


 


WBC Morphology  Not Reportable   05/22/20  04:30    


 


Hypersegmented Neuts  Not Reportable   05/22/20  04:30    


 


Hyposegmented Neuts  Not Reportable   05/22/20  04:30    


 


Hypogranular Neuts  Not Reportable   05/22/20  04:30    


 


Smudge Cells  Not Reportable   05/22/20  04:30    


 


Toxic Granulation  Not Reportable   05/22/20  04:30    


 


Toxic Vacuolation  Not Reportable   05/22/20  04:30    


 


Dohle Bodies  Not Reportable   05/22/20  04:30    


 


Pelger-Huet Anomaly  Not Reportable   05/22/20  04:30    


 


Chitra Rods  Not Reportable   05/22/20  04:30    


 


Platelet Estimate  Consistent w auto   05/22/20  04:30    


 


Clumped Platelets  Not Reportable   05/22/20  04:30    


 


Plt Clumps, EDTA  Not Reportable   05/22/20  04:30    


 


Large Platelets  Not Reportable   05/22/20  04:30    


 


Giant Platelets  Not Reportable   05/22/20  04:30    


 


Platelet Satelliting  Not Reportable   05/22/20  04:30    


 


Plt Morphology Comment  Not Reportable   05/22/20  04:30    


 


RBC Morphology  Not Reportable   05/22/20  04:30    


 


Dimorphic RBCs  Not Reportable   05/22/20  04:30    


 


Polychromasia  Not Reportable   05/22/20  04:30    


 


Hypochromasia  Not Reportable   05/22/20  04:30    


 


Poikilocytosis  Not Reportable   05/22/20  04:30    


 


Anisocytosis  1+   05/22/20  04:30    


 


Microcytosis  Not Reportable   05/22/20  04:30    


 


Macrocytosis  1+   05/22/20  04:30    


 


Spherocytes  Few   05/22/20  04:30    


 


Pappenheimer Bodies  Not Reportable   05/22/20  04:30    


 


Sickle Cells  Not Reportable   05/22/20  04:30    


 


Target Cells  Not Reportable   05/22/20  04:30    


 


Tear Drop Cells  Not Reportable   05/22/20  04:30    


 


Ovalocytes  Few   05/22/20  04:30    


 


Helmet Cells  Not Reportable   05/22/20  04:30    


 


[place] Bodies  Not Reportable   05/22/20  04:30    


 


Cabot Rings  Not Reportable   05/22/20  04:30    


 


Olin Cells  Few   05/22/20  04:30    


 


Bite Cells  Not Reportable   05/22/20  04:30    


 


Crenated Cell  Not Reportable   05/22/20  04:30    


 


Elliptocytes  Not Reportable   05/22/20  04:30    


 


Acanthocytes (Spur)  Not Reportable   05/22/20  04:30    


 


Rouleaux  Not Reportable   05/22/20  04:30    


 


Hemoglobin C Crystals  Not Reportable   05/22/20  04:30    


 


Schistocytes  Not Reportable   05/22/20  04:30    


 


Malaria parasites  Not Reportable   05/22/20  04:30    


 


Wes Bodies  Not Reportable   05/22/20  04:30    


 


Hem Pathologist Commnt  No   05/22/20  04:30    


 


PT  15.3 Sec. (12.2-14.9)  H  05/17/20  03:25    


 


INR  1.20  (0.87-1.13)  H  05/17/20  03:25    


 


APTT  29.0 Sec. (24.2-36.6)   05/17/20  03:25    


 


Heparin Anti-Xa Level  0.12 U.I./ml (0.3-0.7)  L  05/20/20  Unknown


 


ABG pH  7.317 pH Units (7.350-7.450)  L  05/23/20  03:30    


 


ABG pCO2  55.1 mm Hg  05/23/20  03:30    


 


ABG pO2  73.4 mm Hg (80.0-90.0)  L  05/23/20  03:30    


 


ABG HCO3  27.6 mmol/L (20.0-26.0)  H  05/23/20  03:30    


 


ABG O2 Saturation  94.3 % (95.0-99.0)  L  05/23/20  03:30    


 


ABG O2 Content  18.2  (0.0-44)   05/23/20  03:30    


 


ABG Base Excess  0.4 mmol/L (-2.0-3.0)   05/23/20  03:30    


 


ABG Hemoglobin  14.0 gm/dl (12.0-16.0)   05/23/20  03:30    


 


ABG Carboxyhemoglobin  1.7 % (0.0-5.0)   05/23/20  03:30    


 


ABG Methemoglobin  0.5 % (0.0-1.5)   05/23/20  03:30    


 


Oxyhemoglobin  92.3 % (95.0-99.0)  L  05/23/20  03:30    


 


FiO2  60 %  05/23/20  03:30    


 


Sodium  135 mmol/L (137-145)  L  05/25/20  04:23    


 


Potassium  4.8 mmol/L (3.6-5.0)   05/25/20  04:23    


 


Chloride  94.7 mmol/L ()  L  05/25/20  04:23    


 


Carbon Dioxide  25 mmol/L (22-30)   05/25/20  04:23    


 


Anion Gap  20 mmol/L  05/25/20  04:23    


 


BUN  91 mg/dL (7-17)  H  05/25/20  04:23    


 


Creatinine  2.3 mg/dL (0.7-1.2)  H  05/25/20  04:23    


 


Estimated GFR  25 ml/min  05/25/20  04:23    


 


BUN/Creatinine Ratio  40 %  05/25/20  04:23    


 


Glucose  165 mg/dL ()  H  05/25/20  04:23    


 


POC Glucose  158  ()  H  05/24/20  17:41    


 


Calcium  8.5 mg/dL (8.4-10.2)   05/25/20  04:23    


 


Phosphorus  7.00 mg/dL (2.5-4.5)  H  05/21/20  05:35    


 


Magnesium  1.80 mg/dL (1.7-2.3)   05/23/20  05:15    


 


Total Bilirubin  1.20 mg/dL (0.1-1.2)   05/25/20  04:23    


 


AST  72 units/L (5-40)  H  05/25/20  04:23    


 


ALT  362 units/L (7-56)  H  05/25/20  04:23    


 


Alkaline Phosphatase  84 units/L ()   05/25/20  04:23    


 


Total Creatine Kinase  112 units/L ()   05/18/20  16:23    


 


CK-MB (CK-2)  3.5 ng/mL (0.0-4.0)   05/18/20  16:23    


 


Troponin T  0.126 ng/mL (0.00-0.029)  H*  05/17/20  05:29    


 


NT-Pro-B Natriuret Pep  3719 pg/mL (0-900)  H  05/16/20  22:13    


 


Total Protein  5.9 g/dL (6.3-8.2)  L  05/25/20  04:23    


 


Albumin  2.9 g/dL (3.9-5)  L  05/25/20  04:23    


 


Albumin/Globulin Ratio  1.0 %  05/25/20  04:23    


 


Triglycerides  68 mg/dL (2-149)   05/17/20  00:12    


 


Cholesterol  83 mg/dL ()   05/17/20  00:12    


 


LDL Cholesterol Direct  33 mg/dL ()  L  05/17/20  00:12    


 


HDL Cholesterol  49 mg/dL (40-59)   05/17/20  00:12    


 


Cholesterol/HDL Ratio  1.69 %  05/17/20  00:12    


 


Urine Color  Mandi  (Yellow)   05/19/20  Unknown


 


Urine Turbidity  Slightly-cloudy  (Clear)   05/19/20  Unknown


 


Urine pH  5.0  (5.0-7.0)   05/19/20  Unknown


 


Ur Specific Gravity  1.014  (1.003-1.030)   05/19/20  Unknown


 


Urine Protein  30 mg/dl mg/dL (Negative)   05/19/20  Unknown


 


Urine Glucose (UA)  Neg mg/dL (Negative)   05/19/20  Unknown


 


Urine Ketones  Neg mg/dL (Negative)   05/19/20  Unknown


 


Urine Blood  Neg  (Negative)   05/19/20  Unknown


 


Urine Nitrite  Neg  (Negative)   05/19/20  Unknown


 


Urine Bilirubin  Neg  (Negative)   05/19/20  Unknown


 


Urine Urobilinogen  2.0 mg/dL (<2.0)   05/19/20  Unknown


 


Ur Leukocyte Esterase  Sm  (Negative)   05/19/20  Unknown


 


Urine WBC (Auto)  2.0 /HPF (0.0-6.0)   05/19/20  Unknown


 


Urine RBC (Auto)  2.0 /HPF (0.0-6.0)   05/19/20  Unknown


 


U Epithel Cells (Auto)  2.0 /HPF (0-13.0)   05/19/20  Unknown


 


Urine Bacteria (Auto)  1+ /HPF (Negative)   05/19/20  Unknown


 


Urine Osmolality  319 Mosm/kg  05/19/20  Unknown


 


Urine Creatinine  216.1 mg/dL (0.1-20.0)  H  05/19/20  Unknown


 


Protein/Creatinin Ratio  0.29   05/19/20  Unknown


 


Urine Sodium  10 mmol/L  05/19/20  Unknown


 


Urine Total Protein  62 mg/dL (5-11.8)  H  05/19/20  Unknown














- Diagnostic Impressions


Diagnostic Impressions: 








Echocardiogram  05/16/20 23:50


Transthoracic Echocardiogram


 


Indication:


CHF


BP:           84/59


HR:           117


 


Conclusions


 *4-chamber dilated cardiomyopathy.


 *The right heart chambers are both severely dilated. There is


moderately severe tricuspid regurgitation, and severe pulmonary HTN,


with PASP of 82mmHg.


 *Left heart chambers are moderately dilated.


 *Global left ventricular systolic function is severely decreased.


 *The estimated ejection fraction is 20-25%. 


 *There is mild mitral regurgitation. 


 


Findings     


Left Ventricle:


The left ventricular chamber size is moderately dilated. There is no


left ventricular hypertrophy. Severe global hypokinesis of the left


ventricle is observed. Global left ventricular systolic function is


severely decreased. The estimated ejection fraction is 20-25%.  Abnormal


left ventricular diastolic function is observed. 


 


Left Atrium:


The left atrium is moderate to severely dilated.  


 


Right Ventricle:


The right ventricle is severely dilated.  The right ventricular global


systolic function is severely reduced. 


 


Right Atrium:


The right atrial cavity size is severely dilated. 


 


Aortic Valve:


The aortic valve leaflets are moderately thickened. There is trace of


aortic regurgitation. There is no evidence of aortic stenosis. 


 


Mitral Valve:


The mitral valve leaflets are moderately thickened. There is mild mitral


regurgitation.  There is no evidence of mitral stenosis. 


 


Tricuspid Valve:


The tricuspid valve leaflets are normal.  There is moderate to severe


tricuspid regurgitation. The right ventricular systolic pressure is


calculated at 82 mmHg.  There is evidence of severe pulmonary


hypertension. There is no tricuspid stenosis. 


 


Pulmonic Valve:


The pulmonic valve appears normal. There is mild pulmonic regurgitation.


 


 


Pericardium:


A trivial pericardial effusion is visualized. 


 


Aorta:


There is no dilatation of the ascending aorta. There is no dilatation of


the aortic root. 


 


Venous:


The inferior vena cava is dilated.  There is less than 50% respiratory


change in the inferior vena cava dimension. 


 


Measurements     


Chambers 2D


Name                    Value         Normal Range            


IVSd (2D)               1.56 cm       (0.6 - 1.1)             


LVPWd (2D)              1.56 cm       (0.6 - 1.1)             


LVIDd (2D)              5.05 cm       (3.7 - 5.6)             


LVIDs (2D)              4.47 cm       (2 - 3.8)               


LV FS (2D)              11.64 %       -                        


EF Teichholz (2D)       25.1 %        -                        


Ao root diameter (2D)   3.06 cm       (2 - 3.7)               


 


Volumes/Mass


Name                    Value         Normal Range            


LA ESV SP 4CH (A/L)     76.11 ml      -                        


LA ESV SP 2CH (A/L)     48.94 ml      -                        


LA ESV BP (A/L)         62.38 ml      -                        


LA ESV SP 4CH (MOD)     70.31 ml      -                        


LA ESV SP 2CH (MOD)     46.04 ml      -                        


LA ESV BP (MOD)         58.03 ml      -                        


LA ESV BP (MOD) index   28.87 ml/m2   -                        


LV EDV SP 4CH (MOD)     81.51 ml      -                        


LV ESV SP 4CH (MOD)     46.31 ml      -                        


EF SP 4CH (MOD)         43.18 %       -                        


LV EDV SP 2CH (MOD)     84.04 ml      -                        


LV ESV SP 2CH (MOD)     60.72 ml      -                        


EF SP 2CH (MOD)         27.76 %       -                        


LV EDV BP               84.52 ml      -                        


LV ESV BP               53.64 ml      -                        


BP EF (MOD)             36.54 %       -                        


 


Aortic Valve


Name                    Value         Normal Range            


LVOT diameter           2.01 cm       -                        


LVOT Vmax               0.64 m/sec    -                        


LVOT VTI                8.01 cm       -                        


LVOT peak gradient      1.62 mmHg     -                        


LVOT mean gradient      0.79 mmHg     -                        


SV LVOT                 25.42 ml      -                        


Ascending Ao            2.81 cm       -                        


 


Tricuspid Valve


Name                    Value         Normal Range            


TR Vmax                 4.09 m/sec    -                        


TR peak gradient        66.78 mmHg    -                        


RAP                     15 mmHg       -                        


RVSP                    82 mmHg       -                        


 


Pulmonic Valve/Qp:Qs


Name                    Value         Normal Range            


PV Vmax                 0.87 m/sec    -                        


PV peak gradient        3.06 mmHg     -                        


NV end-diastolic Vmax   2.66 m/sec    -                        


RVOT Vmax               0.7 m/sec     -                        


RVOT peak gradient      1.96 mmHg     -                        


PV acceleration time    53.28 msec    -                        


 


Electronically signed by: Shade Vela MD on 05/17/2020 15:34:04











Min/IV: 


                                        





Voiding Method                   External Female Catheter


IV Catheter Type [Right          Triple Lumen Cath


Femoral]                         


IV Catheter Type [Right Foot]    INT / Saline Lock


IV Catheter Type [Right          INT / Saline Lock


Forearm]                         


IV Catheter Type [Left Wrist]    INT / Saline Lock


IV Catheter Type [Left Forearm   INT / Saline Lock


]                                











Active Medications





- Current Medications


Current Medications: 














Generic Name Dose Route Start Last Admin





  Trade Name Freq  PRN Reason Stop Dose Admin


 


Acetaminophen  650 mg  05/16/20 23:45  05/23/20 21:37





  Tylenol  PO   650 mg





  Q4H PRN   Administration





  Pain MILD(1-3)/Fever >100.5/HA  


 


Albuterol  2.5 mg  05/17/20 14:57 





  Proventil  IH  





  Q4HRT PRN  





  Shortness Of Breath  


 


Albuterol/Ipratropium  1 ampul  05/17/20 20:00  05/25/20 07:58





  Duoneb *Not For Prn Use*  IH   Not Given





  TIDRT [facility]  


 


Lipase/Protease/Amylase  1 each  05/20/20 15:33 





  Pancrewlilie Cloud 10,500 Unit  FEEDTUBE  





  PRN PRN  





  For Clogged Feeding Tube  


 


Arformoterol Tartrate  15 mcg  05/17/20 20:00  05/25/20 07:56





  Brovana Nebu  IH   15 mcg





  Q12HRT AMANDA   Administration


 


Aspirin  325 mg  05/17/20 10:00  05/25/20 10:16





  Ecotrin  PO   325 mg





  QDAY AMANDA   Administration


 


Atorvastatin Calcium  40 mg  05/17/20 22:00  05/24/20 22:40





  Lipitor  PO   40 mg





  QHS AMANDA   Administration


 


Budesonide  0.5 mg  05/17/20 20:00  05/25/20 07:56





  Pulmicort  IH   0.5 mg





  Q12HRT [facility]   Administration


 


Dextrose  50 ml  05/20/20 17:44 





  D50w (25gm) Syringe  IV  





  Q30MIN PRN  





  Hypoglycemia  





  Protocol  


 


Diltiazem HCl  30 mg  05/17/20 18:00  05/25/20 07:40





  Cardizem  PO   Not Given





  Q6HR [facility]  


 


Famotidine  20 mg  05/25/20 10:00  05/25/20 10:16





  Pepcid  PO   20 mg





  DAILY [facility]   Administration


 


Magnesium Hydroxide  30 ml  05/16/20 23:45 





  Milk Of Magnesia  PO  





  Q4H PRN  





  Constipation  


 


Methylprednisolone Sodium Succinate  40 mg  05/21/20 10:00  05/25/20 10:16





  Solu-Medrol  IV   40 mg





  Q12HR AMANDA   Administration


 


Morphine Sulfate  2 mg  05/16/20 23:45 





  Morphine  IV  





  Q5MIN PRN  





  Chest Pain unrelieved by NTG  


 


Nitroglycerin  0.4 mg  05/16/20 23:45 





  Nitrostat  SL  





  .Q5MIN PRN  





  Chest Pain  


 


Ondansetron HCl  4 mg  05/16/20 23:45 





  Zofran  IV  





  Q8H PRN  





  Nausea And Vomiting  


 


Simple Syrup  15 ml  05/20/20 15:33 





  Simple Syrup  FEEDTUBE  





  PRN PRN  





  Hypoglycemia  


 


Simple Syrup  30 ml  05/20/20 15:33 





  Simple Syrup  FEEDTUBE  





  PRN PRN  





  Hypoglycemia  


 


Sodium Bicarbonate  325 mg  05/20/20 15:33 





  Sodium Bicarbonate  FEEDTUBE  





  PRN PRN  





  For Clogged Feeding Tube  


 


Sodium Chloride  10 ml  05/17/20 10:00  05/25/20 10:17





  Sodium Chloride Flush Syringe 10 Ml  IV   10 ml





  BID AMANDA   Administration


 


Sodium Chloride  10 ml  05/16/20 23:45 





  Sodium Chloride Flush Syringe 10 Ml  IV  





  PRN PRN  





  LINE FLUSH  














Nutrition/Malnutrition Assess





- Dietary Evaluation


Nutrition/Malnutrition Findings: 


                                        





Nutrition Notes                                            Start:  05/20/20 

15:23


Freq:                                                      Status: Active       




Protocol:                                                                       




 Document     05/25/20 08:16  LP  (Rec: 05/25/20 08:19  LP  [id number])


 Nutrition Notes


     Initial or Follow up                        Reassessment


     Current Diagnosis                           Acute Kidney Injury,COPD,


                                                 Hypertension,Heart Failure


     Other Pertinent Diagnosis                   chest pain, volume overload


     Current Diet                                Nepro at 30ml/hr


     Labs/Tests                                  Na 135


                                                 BUN 91


                                                 Cr 2.3


                                                 


     Pertinent Medications                       Reviewed


     Height                                      5 ft


     Weight                                      89.8 kg


     Ideal Body Weight (kg)                      45.45


     BMI                                         38.6


     Weight Status                               Obese


     Subjective/Other Information                Pt tolerating Nepro at goal


                                                 rate. SLP consulted but unable


                                                 to complete assessment at the


                                                 time and will reassess.


     Percent of energy/protein needs met:        100%/100%


     Burn                                        Absent


     Trauma                                      Absent


     Minimum of two criteria                     No


     Fluid Accumulation                          Moderate to Severe (severe)


     #1


      Nutrition Diagnosis                        Inadequate oral intake


      Diagnosis Progress(for reassessment        Continues


       documentation)                            


     Is patient on ventilator?                   No


     Is Patient Ambulatory and/or Out of Bed     No


     REE-([facility]-confined to bed)      1613.040


     Kcal/Kg value to use for calculation        14


     Approximate Energy Requirements Using       1257


      kcal/Kg                                    


     Calculation Used for Recommendations        Kcal/kg


     Additional Notes                            Protein: 54-82g (0.8-1.2g/kg


                                                 using AdjBW 68kg)


                                                 Fluid 1ml/kcal or per MD


 Nutrition Intervention


     Change Diet Order:                          TF


     Nutrition Support:                          Nepro 1.8 at 30ml/hr


                                                 Flush 100ml q4h


     Kcal                                        1,296


     Protein (gm)                                58


     Fluid (mL)                                  523


     Goal #1                                     Meet at least 80% of kcal and


                                                 protein needs


     Anticipated Discharge Needs:                Unable to determine at this


                                                 time


     Follow-Up By:                               05/29/20


     Additional Comments                         Follow for TF tolerance or


                                                 diet advancement/intakes

## 2020-05-25 NOTE — PROGRESS NOTE
Assessment and Plan





1.  Atrial flutter with variable AV block


2.  Chronic combined systolic and diastolic heart failure


3.  Dilated cardiomyopathy left ventricular ejection fraction 20 to 25%


4.  Acute exacerbation of COPD


5.  Chronic cor pulmonale with severe pulmonary hypertension


6.  Chronic kidney disease stage IV


7.  Hyponatremia





Plan.


Continue management as per pulmonary plan.  Cardiac wise patient is stable we 

will monitor fluid input and output closely.


Further cardiac work-up when more stable





Subjective


Date of service: 05/25/20


Principal diagnosis: Ac hypoxemic and hypercapnic resp failure; AE-COPD; NSTEMI;

MILAN; HFrEF


Interval history: 





No change in clinical status. No new cardiac symptoms.





Objective


                                   Vital Signs











  Temp Pulse Pulse Pulse Resp Resp BP


 


 05/25/20 08:00  97.8 F  78     


 


 05/25/20 07:58    86    18 


 


 05/25/20 05:01   99 H    24   92/66


 


 05/25/20 04:40   105 H    26 H  


 


 05/25/20 04:37       


 


 05/25/20 04:01   95 H    20   77/55


 


 05/25/20 04:00  97.6 F  105 H   105 H  26 H  


 


 05/25/20 03:00   83    24   93/61


 


 05/25/20 02:01   88    24   102/66


 


 05/25/20 01:12        99/73


 


 05/25/20 01:00   105 H    26 H   99/73


 


 05/25/20 00:20   101 H    25 H   109/74


 


 05/25/20 00:00  97.7 F  80   80  26 H   109/74


 


 05/24/20 23:01   107 H    20   108/69


 


 05/24/20 22:35   101 H    20   108/69


 


 05/24/20 22:01   95 H    20   108/69


 


 05/24/20 21:01   88    21   115/70


 


 05/24/20 21:00    96 H  98 H  20  22 


 


 05/24/20 20:00  97.6 F  94 H    23   109/55


 


 05/24/20 19:45        103/76


 


 05/24/20 19:18        103/76


 


 05/24/20 18:01   108 H    21   103/76


 


 05/24/20 17:01   101 H    28 H   93/64


 


 05/24/20 16:00  98.1 F  106 H   106 H  11 L   109/86


 


 05/24/20 15:22       


 


 05/24/20 15:01   112 H    20   105/76


 


 05/24/20 15:00    106 H    18 


 


 05/24/20 14:00   101 H    22   105/76


 


 05/24/20 13:08        105/76


 


 05/24/20 13:01   99 H    21   105/76


 


 05/24/20 12:00  97.8 F  106 H   106 H  24   105/76


 


 05/24/20 11:01   102 H    22   102/64














  Pulse Ox


 


 05/25/20 08:00 


 


 05/25/20 07:58  92


 


 05/25/20 05:01  93


 


 05/25/20 04:40  93


 


 05/25/20 04:37  93


 


 05/25/20 04:01  93


 


 05/25/20 04:00  91


 


 05/25/20 03:00  93


 


 05/25/20 02:01  92


 


 05/25/20 01:12 


 


 05/25/20 01:00  91


 


 05/25/20 00:20  92


 


 05/25/20 00:00  91


 


 05/24/20 23:01  90


 


 05/24/20 22:35  90


 


 05/24/20 22:01  91


 


 05/24/20 21:01  91


 


 05/24/20 21:00  98


 


 05/24/20 20:00  93


 


 05/24/20 19:45  93


 


 05/24/20 19:18 


 


 05/24/20 18:01  92


 


 05/24/20 17:01  95


 


 05/24/20 16:00  94


 


 05/24/20 15:22  93


 


 05/24/20 15:01  93


 


 05/24/20 15:00 


 


 05/24/20 14:00  95


 


 05/24/20 13:08 


 


 05/24/20 13:01  95


 


 05/24/20 12:00  94


 


 05/24/20 11:01  95














- Physical Examination


General: No Apparent Distress


HEENT: Positive: PERRL


Neck: Positive: neck supple


Cardiac: Positive: Regular Rate, S1/S2, PMI, Laterally Displaced.  Negative: S3,

S4


Lungs: Positive: Normal Breath Sounds, No Wheeze, Rales, Rhonchi


Neuro: Positive: Grossly Intact


Abdomen: Positive: Soft


Skin: Positive: Clear


Extremities: Present: +1 Edema





- Labs and Meds


                                 Cardiac Enzymes











  05/25/20 Range/Units





  04:23 


 


AST  72 H  (5-40)  units/L








                                       CBC











  05/25/20 Range/Units





  04:23 


 


Hgb  11.3  D  (10.1-14.3)  gm/dl


 


Hct  35.2  D  (30.3-42.9)  %


 


Plt Count  25 L  (140-440)  K/mm3








                          Comprehensive Metabolic Panel











  05/25/20 Range/Units





  04:23 


 


Sodium  135 L  (137-145)  mmol/L


 


Potassium  4.8  (3.6-5.0)  mmol/L


 


Chloride  94.7 L  ()  mmol/L


 


Carbon Dioxide  25  (22-30)  mmol/L


 


BUN  91 H  (7-17)  mg/dL


 


Creatinine  2.3 H  (0.7-1.2)  mg/dL


 


Glucose  165 H  ()  mg/dL


 


Calcium  8.5  (8.4-10.2)  mg/dL


 


AST  72 H  (5-40)  units/L


 


ALT  362 H  (7-56)  units/L


 


Alkaline Phosphatase  84  ()  units/L


 


Total Protein  5.9 L  (6.3-8.2)  g/dL


 


Albumin  2.9 L  (3.9-5)  g/dL














- Allied health notes


Allied health notes reviewed: RT

## 2020-05-26 LAB
ALBUMIN SERPL-MCNC: 2.8 G/DL (ref 3.9–5)
ALT SERPL-CCNC: 228 UNITS/L (ref 7–56)
BAND NEUTROPHILS # (MANUAL): 0 K/MM3
BUN SERPL-MCNC: 110 MG/DL (ref 7–17)
BUN/CREAT SERPL: 46 %
CALCIUM SERPL-MCNC: 8.7 MG/DL (ref 8.4–10.2)
HCT VFR BLD CALC: 45.8 % (ref 30.3–42.9)
HEMOLYSIS INDEX: 15
HGB BLD-MCNC: 14.6 GM/DL (ref 10.1–14.3)
MCHC RBC AUTO-ENTMCNC: 32 % (ref 30–34)
MCV RBC AUTO: 102 FL (ref 79–97)
MYELOCYTES # (MANUAL): 0 K/MM3
OVALOCYTES BLD QL SMEAR: (no result)
PLATELET # BLD: 38 K/MM3 (ref 140–440)
PROMYELOCYTES # (MANUAL): 0 K/MM3
RBC # BLD AUTO: 4.48 M/MM3 (ref 3.65–5.03)
TOTAL CELLS COUNTED BLD: 100

## 2020-05-26 PROCEDURE — 5A09357 ASSISTANCE WITH RESPIRATORY VENTILATION, LESS THAN 24 CONSECUTIVE HOURS, CONTINUOUS POSITIVE AIRWAY PRESSURE: ICD-10-PCS | Performed by: INTERNAL MEDICINE

## 2020-05-26 RX ADMIN — IPRATROPIUM BROMIDE AND ALBUTEROL SULFATE SCH: .5; 3 SOLUTION RESPIRATORY (INHALATION) at 15:47

## 2020-05-26 RX ADMIN — IPRATROPIUM BROMIDE AND ALBUTEROL SULFATE SCH AMPUL: .5; 3 SOLUTION RESPIRATORY (INHALATION) at 21:30

## 2020-05-26 RX ADMIN — METHYLPREDNISOLONE SODIUM SUCCINATE SCH MG: 40 INJECTION, POWDER, FOR SOLUTION INTRAMUSCULAR; INTRAVENOUS at 09:46

## 2020-05-26 RX ADMIN — BUDESONIDE SCH MG: 0.5 INHALANT RESPIRATORY (INHALATION) at 09:20

## 2020-05-26 RX ADMIN — IPRATROPIUM BROMIDE AND ALBUTEROL SULFATE SCH AMPUL: .5; 3 SOLUTION RESPIRATORY (INHALATION) at 15:46

## 2020-05-26 RX ADMIN — Medication SCH ML: at 09:47

## 2020-05-26 RX ADMIN — ASPIRIN SCH MG: 325 TABLET, COATED ORAL at 09:46

## 2020-05-26 RX ADMIN — ARFORMOTEROL TARTRATE SCH MCG: 15 SOLUTION RESPIRATORY (INHALATION) at 09:20

## 2020-05-26 RX ADMIN — Medication SCH ML: at 22:47

## 2020-05-26 RX ADMIN — ARFORMOTEROL TARTRATE SCH MCG: 15 SOLUTION RESPIRATORY (INHALATION) at 21:30

## 2020-05-26 RX ADMIN — IPRATROPIUM BROMIDE AND ALBUTEROL SULFATE SCH AMPUL: .5; 3 SOLUTION RESPIRATORY (INHALATION) at 09:20

## 2020-05-26 RX ADMIN — BUDESONIDE SCH MG: 0.5 INHALANT RESPIRATORY (INHALATION) at 21:30

## 2020-05-26 RX ADMIN — METHYLPREDNISOLONE SODIUM SUCCINATE SCH MG: 40 INJECTION, POWDER, FOR SOLUTION INTRAMUSCULAR; INTRAVENOUS at 22:47

## 2020-05-26 RX ADMIN — FAMOTIDINE SCH MG: 20 TABLET ORAL at 09:46

## 2020-05-26 NOTE — PROGRESS NOTE
Assessment and Plan





- Patient Problems


(1) Atrial flutter


Current Visit: Yes   Status: Acute   


Plan to address problem: 


Continue management of atrial flutter and atrial fibrillation, on a rate control

strategy.








(2) Cor pulmonale


Current Visit: Yes   Status: Acute   


Plan to address problem: 


Patient has severe pulmonary hypertension with pulmonary artery systolic 

pressure of 82, consistent with severe cor pulmonale.  Continue supportive 

management of pulmonary status.








Subjective


Date of service: 05/26/20


Principal diagnosis: Ac hypoxemic and hypercapnic resp failure; AE-COPD; NSTEMI;

MILAN; HFrEF


Interval history: 





Patient is unresponsive, on the vent.  Telemetry shows atrial fibrillation with 

well-controlled ventricular rate, 90s to 100s, blood pressure 115/74.





Objective


                                   Vital Signs











  Temp Pulse Pulse Pulse Resp Resp BP


 


 05/26/20 09:52    112 H    23 


 


 05/26/20 08:00  98.0 F      


 


 05/26/20 07:08   84      112/74


 


 05/26/20 07:00   111 H    22   123/80


 


 05/26/20 06:01        123/100


 


 05/26/20 05:00   93 H    22   131/82


 


 05/26/20 04:00  97.6 F  100 H   112 H  26 H   103/68


 


 05/26/20 03:40   110 H    24   122/82


 


 05/26/20 03:00   94 H    24   103/68


 


 05/26/20 02:00   98 H    24   126/81


 


 05/26/20 01:00   95 H    25 H   118/74


 


 05/26/20 00:00  97.4 F L  101 H   109 H  25 H   111/87


 


 05/25/20 23:50   98 H    27 H   120/77


 


 05/25/20 23:00   93 H    25 H   120/77


 


 05/25/20 22:00   92 H    27 H   111/73


 


 05/25/20 21:00   104 H    25 H   105/70


 


 05/25/20 20:00  98.0 F  116 H    22   105/70


 


 05/25/20 19:40    90    22 


 


 05/25/20 19:00   106 H    23   105/70


 


 05/25/20 18:00   100 H    21   105/70


 


 05/25/20 17:20   87    26 H   109/74


 


 05/25/20 17:00   95 H    24   109/74


 


 05/25/20 16:02       


 


 05/25/20 16:00   101 H   108 H  25 H   108/64


 


 05/25/20 15:00   102 H    23   118/67


 


 05/25/20 14:00   99 H    21   106/66


 


 05/25/20 13:01       


 


 05/25/20 13:00   99 H    21   104/75


 


 05/25/20 12:00  98.2 F  107 H   103 H  20   110/83














  Pulse Ox


 


 05/26/20 09:52 


 


 05/26/20 08:00  95


 


 05/26/20 07:08 


 


 05/26/20 07:00 


 


 05/26/20 06:01 


 


 05/26/20 05:00  95


 


 05/26/20 04:00  95


 


 05/26/20 03:40  94


 


 05/26/20 03:00  93


 


 05/26/20 02:00  93


 


 05/26/20 01:00  92


 


 05/26/20 00:00  93


 


 05/25/20 23:50  92


 


 05/25/20 23:00  94


 


 05/25/20 22:00  94


 


 05/25/20 21:00  94


 


 05/25/20 20:00  94


 


 05/25/20 19:40  92


 


 05/25/20 19:00  77 L


 


 05/25/20 18:00  93


 


 05/25/20 17:20  90


 


 05/25/20 17:00  90


 


 05/25/20 16:02  92


 


 05/25/20 16:00  92


 


 05/25/20 15:00  91


 


 05/25/20 14:00  92


 


 05/25/20 13:01  92


 


 05/25/20 13:00  91


 


 05/25/20 12:00  93














- Physical Examination


General: Other (Unresponsive, on the vent)


HEENT: Positive: PERRL


Neck: Positive: neck supple


Cardiac: Positive: irregularly irregular


Neuro: Positive: Other (Unresponsive, on the vent)


Abdomen: Positive: Soft


Skin: Positive: Clear


Extremities: Present: edema (trace)





- Labs and Meds


                                 Cardiac Enzymes











  05/26/20 Range/Units





  10:04 


 


AST  33  (5-40)  units/L








                          Comprehensive Metabolic Panel











  05/26/20 Range/Units





  10:04 


 


Sodium  139  (137-145)  mmol/L


 


Potassium  4.6  (3.6-5.0)  mmol/L


 


Chloride  97.2 L  ()  mmol/L


 


Carbon Dioxide  26  (22-30)  mmol/L


 


BUN  110 H  (7-17)  mg/dL


 


Creatinine  2.4 H  (0.7-1.2)  mg/dL


 


Glucose  205 H  ()  mg/dL


 


Calcium  8.7  (8.4-10.2)  mg/dL


 


AST  33  (5-40)  units/L


 


ALT  228 H  (7-56)  units/L


 


Alkaline Phosphatase  83  ()  units/L


 


Total Protein  5.3 L  (6.3-8.2)  g/dL


 


Albumin  2.8 L  (3.9-5)  g/dL














- Allied health notes


Allied health notes reviewed: nursing

## 2020-05-26 NOTE — PROGRESS NOTE
Assessment and Plan





This is a 70 year old woman with CHF, COPD who presents with shortness of 

breath, chest pain now with MILAN, hyponatremia





# Acute Kidney Injury:  creatinine 0.7->1.3->1.9->2.8->3.4->2.9->2.3/2.4 with 

renal function stable past 2-3 days.  Suspect this is related to tubular injury 

in setting of hypotension, with changes due to fluid shifting with diuretic 

therapy and cardiorenal physiology.  Off sotalol. Continue to be cautious with 

diuretic therapy and defer to pulm/cardiology to use prn based on respiratory 

status.  No indication for renal replacement therapy at this time, will follow 

closely given clinical status; is at high risk for requiring renal replacement 

therapy but stable with improved MAP.


- daily renal labs


- avoid nephrotoxins


- low salt diet


- strict Is/Os


- maintain MAP >70, continue pressors prn 


- supportive measures per ICU





# Azotemia: likely due to MILAN and steroid use; may have signs of uremia given 

confusion but no indication for renal replacement therapy with stable 

creatinine, reasonable urine output 





# Hyponatremia: suspect hyponatremia related to volume overload, improved now to

139.  Ok to use furosemide for volume prn as noted above; furosemide should 

promote more water loss than sodium, but still need to monitor closely for 

worsening of hyponatremia





# Respiratory Acidosis: suspect related to underlying pulmonary disease; 

improved now





# NSTEMI/ CHF/ atrial flutter: appreciate cardiology input





# Shortness of Breath, Respiratory Distress, Pulmonary Hypertension, COPD: 

appreciate pulmonary input





# Anemia of chronic disease 





# Transaminitis





Thank you for this interesting consult; we will continue to follow closely with 

a guarded renal prognosis.











Subjective


Date of service: 05/26/20


Principal diagnosis: Ac hypoxemic and hypercapnic resp failure; AE-COPD; NSTEMI;

MILAN; HFrEF


Interval history: 





Remains in ICU. Remains altered this afternoon





Objective





- Exam


Narrative Exam: 





Constitutional: no acute distress, laying in bed comfortably, minimally 

responsive


Head: NC/AT


Neck: supple


Lungs: coarse breath sounds


CV: RRR, no M/R/G


Abdomen: soft, non-tender, bowel sounds present


Back: nontender


Extremities: no edema, pulses WNL


Skin: intact


Neuro: confused, no clear thoughts noted





- Vital Signs


Vital signs: 


                               Vital Signs - 12hr











  05/26/20 05/26/20 05/26/20





  02:00 03:00 03:40


 


Temperature   


 


Pulse Rate 98 H 94 H 110 H


 


Pulse Rate [   





Bilateral   





Throughout]   


 


Pulse Rate [   





From Monitor]   


 


Respiratory 24 24 24





Rate   


 


Respiratory   





Rate [Bilateral   





Throughout]   


 


Blood Pressure 126/81 103/68 122/82


 


O2 Sat by Pulse 93 93 94





Oximetry   














  05/26/20 05/26/20 05/26/20





  04:00 05:00 06:01


 


Temperature 97.6 F  


 


Pulse Rate 100 H 93 H 


 


Pulse Rate [   





Bilateral   





Throughout]   


 


Pulse Rate [ 112 H  





From Monitor]   


 


Respiratory 26 H 22 





Rate   


 


Respiratory   





Rate [Bilateral   





Throughout]   


 


Blood Pressure 103/68 131/82 123/100


 


O2 Sat by Pulse 95 95 





Oximetry   














  05/26/20 05/26/20 05/26/20





  07:00 07:08 08:00


 


Temperature   98.0 F


 


Pulse Rate 111 H 84 


 


Pulse Rate [   





Bilateral   





Throughout]   


 


Pulse Rate [   





From Monitor]   


 


Respiratory 22  





Rate   


 


Respiratory   





Rate [Bilateral   





Throughout]   


 


Blood Pressure 123/80 112/74 


 


O2 Sat by Pulse   95





Oximetry   














  05/26/20 05/26/20





  09:52 12:00


 


Temperature  98.4 F


 


Pulse Rate  


 


Pulse Rate [ 112 H 





Bilateral  





Throughout]  


 


Pulse Rate [  





From Monitor]  


 


Respiratory  





Rate  


 


Respiratory 23 





Rate [Bilateral  





Throughout]  


 


Blood Pressure  


 


O2 Sat by Pulse  





Oximetry  














- Lab





                                 05/25/20 04:23





                                 05/26/20 10:04


                             Most recent lab results











ABG pH  7.317 pH Units (7.350-7.450)  L  05/23/20  03:30    


 


ABG pCO2  55.1 mm Hg  05/23/20  03:30    


 


ABG pO2  73.4 mm Hg (80.0-90.0)  L  05/23/20  03:30    


 


ABG HCO3  27.6 mmol/L (20.0-26.0)  H  05/23/20  03:30    


 


ABG O2 Saturation  94.3 % (95.0-99.0)  L  05/23/20  03:30    


 


Calcium  8.7 mg/dL (8.4-10.2)   05/26/20  10:04    


 


Phosphorus  7.00 mg/dL (2.5-4.5)  H  05/21/20  05:35    


 


Magnesium  2.10 mg/dL (1.7-2.3)   05/26/20  10:04    


 


Urine Creatinine  216.1 mg/dL (0.1-20.0)  H  05/19/20  Unknown


 


Urine Sodium  10 mmol/L  05/19/20  Unknown


 


Urine Total Protein  62 mg/dL (5-11.8)  H  05/19/20  Unknown














Medications & Allergies





- Medications


Allergies/Adverse Reactions: 


                                    Allergies





No Known Allergies Allergy (Unverified 05/13/18 11:23)


   








Home Medications: 


                                Home Medications











 Medication  Instructions  Recorded  Confirmed  Last Taken  Type


 


No Known Home Medications [No  05/16/20 05/16/20 Unknown History





Reported Home Medications]     











Active Medications: 














Generic Name Dose Route Start Last Admin





  Trade Name Freq  PRN Reason Stop Dose Admin


 


Acetaminophen  650 mg  05/16/20 23:45  05/23/20 21:37





  Tylenol  PO   650 mg





  Q4H PRN   Administration





  Pain MILD(1-3)/Fever >100.5/HA  


 


Albuterol  2.5 mg  05/17/20 14:57 





  Proventil  IH  





  Q4HRT PRN  





  Shortness Of Breath  


 


Albuterol/Ipratropium  1 ampul  05/17/20 20:00  05/26/20 09:20





  Duoneb *Not For Prn Use*  IH   1 ampul





  TIDRT AMANDA   Administration


 


Lipase/Protease/Amylase  1 each  05/20/20 15:33 





  Pancreaze Dr 10,500 Unit  FEEDTUBE  





  PRN PRN  





  For Clogged Feeding Tube  


 


Arformoterol Tartrate  15 mcg  05/17/20 20:00  05/26/20 09:20





  Brovana Nebu  IH   15 mcg





  Q12HRT AMANDA   Administration


 


Aspirin  325 mg  05/17/20 10:00  05/26/20 09:46





  Ecotrin  PO   325 mg





  QDAY AMANDA   Administration


 


Atorvastatin Calcium  40 mg  05/17/20 22:00  05/25/20 22:06





  Lipitor  PO   40 mg





  QHS AMANDA   Administration


 


Budesonide  0.5 mg  05/17/20 20:00  05/26/20 09:20





  Pulmicort  IH   0.5 mg





  Q12HRT AMANDA   Administration


 


Dextrose  50 ml  05/20/20 17:44 





  D50w (25gm) Syringe  IV  





  Q30MIN PRN  





  Hypoglycemia  





  Protocol  


 


Diltiazem HCl  30 mg  05/17/20 18:00  05/26/20 08:22





  Cardizem  PO   Not Given





  Q6HR AMANDA  


 


Famotidine  20 mg  05/25/20 10:00  05/26/20 09:46





  Pepcid  PO   20 mg





  DAILY AMANDA   Administration


 


Magnesium Hydroxide  30 ml  05/16/20 23:45 





  Milk Of Magnesia  PO  





  Q4H PRN  





  Constipation  


 


Methylprednisolone Sodium Succinate  40 mg  05/21/20 10:00  05/26/20 09:46





  Solu-Medrol  IV   40 mg





  Q12HR AMANDA   Administration


 


Morphine Sulfate  2 mg  05/16/20 23:45 





  Morphine  IV  





  Q5MIN PRN  





  Chest Pain unrelieved by NTG  


 


Nitroglycerin  0.4 mg  05/16/20 23:45 





  Nitrostat  SL  





  .Q5MIN PRN  





  Chest Pain  


 


Ondansetron HCl  4 mg  05/16/20 23:45 





  Zofran  IV  





  Q8H PRN  





  Nausea And Vomiting  


 


Simple Syrup  15 ml  05/20/20 15:33 





  Simple Syrup  FEEDTUBE  





  PRN PRN  





  Hypoglycemia  


 


Simple Syrup  30 ml  05/20/20 15:33 





  Simple Syrup  FEEDTUBE  





  PRN PRN  





  Hypoglycemia  


 


Sodium Bicarbonate  325 mg  05/20/20 15:33 





  Sodium Bicarbonate  FEEDTUBE  





  PRN PRN  





  For Clogged Feeding Tube  


 


Sodium Chloride  10 ml  05/17/20 10:00  05/26/20 09:47





  Sodium Chloride Flush Syringe 10 Ml  IV   10 ml





  BID AMANDA   Administration


 


Sodium Chloride  10 ml  05/16/20 23:45 





  Sodium Chloride Flush Syringe 10 Ml  IV  





  PRN PRN  





  LINE FLUSH

## 2020-05-26 NOTE — PROGRESS NOTE
Assessment and Plan








Acute hypoxemic and hypercapnic respiratory failure


COPD


Severe pulmonary hypertension


Hyponatremia


Acute toxic-metabolic encephalaopthy


Tobacco use disorder/Nicotine dependence


Non-ST elevation MI 


Acute on chronic congestive heart failure-systolic, EF 20-25%


Acute kidney injury secondary to vasomotor nephropathy and diuresis


Bilateral lower extremity edema


Anemia of chronic disease 


Morbid obesity





- continue qhs BIPAP as tolerated with prn daytime use


- azotemia per nephrology


- will consider 72 hours on inotropes to see if azotemia improves


- watch for signs of bleeding


- continue to avoid heparinoids


- follow HIT assay report


- continue care as below otherwise





- continue enteral nutrition as tolerated


- continue aspiration precautions


- continue supplemental oxygen as needed to keep O2 sat's > 92%


- continue bronchodilators (SAIDA & LABA) with pulmonary hygiene per RT


- continue systemic steroids with taper


- continue inhaled corticosteroids


- empiric CAP AB's therapy with Levaquin


- PT/OT as tolerated


- mobility protocols for pressure ulcer prophylaxis


- accuchecks with glycemic control per SSI for target BG < 140-180 mg/dl


- soft restraints as pulling at NGT 


- NSTEMI per cardiology team


- Avoid nephrotoxins, adjust all medications for GFR and CrCL 


- heparin stopped re: thrombocytopenia


- HIT assay ordered


- ACS work-up & optimizatio of CHF management per cardiology


- wean levophed for target MAP >/= 65 mmHg


- mobility protocols for pressure ulcer prophylaxis


- avoid benzodiazepines acute re: delirium issues


- prn analgesia per CPOT score


- continue GI & VTE prophylaxis


- Flu & pneumovax addressed per protocol


- continue other care per attending / other consultants





.. re-evaluate in am & prn





CONDITION- CRITICAL


PROGNOSIS- GUARDED


CODE STATUS- FULL CODE





Life threatening condition: Severe pulmonary HTN, acute and chronic  hypoxic 

respiratory failure on high flow oxygen, acute on chronic systolic heart 

failure, hyponatremia, symptomatic hypoglycemia


Morbidity/Mortality: High


complexity of medical decision making: High





The high probability of a clinically significant, sudden or life-threatening 

deterioration of the [respiratory, cardiovascular,neurologic, renal] system(s) 

required my full and direct attention, intervention and personal management. The

aggregate critical care time was [32] minutes without overlap. Time includes 

spent on;





[x] Data Review and interpretation 





[x] Patient assessment and monitoring of vital signs 





[x] Documentation 





[x] Medication orders and management








Subjective


Date of service: 05/26/20


Principal diagnosis: Ac hypoxemic and hypercapnic resp failure; AE-COPD; NSTEMI;

 MILAN; HFrEF


Interval history: 





Patient is seen today for: Acute hypoxemic and hypercapnic respiratory failure; 

AE-COPD; Severe Pulm HTN; Acute toxic-metabolic encephalaopthy; NSTEMI; MILAN; 

HFrEF





Seen and examined at bedside; 24hour events reviewed; nursing and respiratory 

care staff consulted; no adverse overnight events reported to me; resting 

peacefully in bed; remains on HFNC at 80% FiO2; remains somnolent to lethargic; 

no emesis or overt aspiration





Objective


                               Vital Signs - 12hr











  05/26/20 05/26/20 05/26/20





  03:00 03:40 04:00


 


Temperature   97.6 F


 


Pulse Rate 94 H 110 H 100 H


 


Pulse Rate [   





Bilateral   





Throughout]   


 


Pulse Rate [   112 H





From Monitor]   


 


Respiratory 24 24 26 H





Rate   


 


Respiratory   





Rate [Bilateral   





Throughout]   


 


Blood Pressure 103/68 122/82 103/68


 


O2 Sat by Pulse 93 94 95





Oximetry   














  05/26/20 05/26/20 05/26/20





  05:00 06:01 07:00


 


Temperature   


 


Pulse Rate 93 H  111 H


 


Pulse Rate [   





Bilateral   





Throughout]   


 


Pulse Rate [   





From Monitor]   


 


Respiratory 22  22





Rate   


 


Respiratory   





Rate [Bilateral   





Throughout]   


 


Blood Pressure 131/82 123/100 123/80


 


O2 Sat by Pulse 95  





Oximetry   














  05/26/20 05/26/20 05/26/20





  07:08 08:00 09:52


 


Temperature  98.0 F 


 


Pulse Rate 84  


 


Pulse Rate [   112 H





Bilateral   





Throughout]   


 


Pulse Rate [   





From Monitor]   


 


Respiratory   





Rate   


 


Respiratory   23





Rate [Bilateral   





Throughout]   


 


Blood Pressure 112/74  


 


O2 Sat by Pulse  95 





Oximetry   














  05/26/20





  12:00


 


Temperature 98.4 F


 


Pulse Rate 


 


Pulse Rate [ 





Bilateral 





Throughout] 


 


Pulse Rate [ 





From Monitor] 


 


Respiratory 





Rate 


 


Respiratory 





Rate [Bilateral 





Throughout] 


 


Blood Pressure 


 


O2 Sat by Pulse 





Oximetry 











Constitutional: appears uncomfortable, other (elderly obese female with mildly 

increased respiratory effort at rest)


Eyes: non-icteric


ENT: oropharynx dry


Neck: supple, no lymphadenopathy


Effort: mildly labored


Ascultation: Bilateral: diminished breath sounds (poor AE bilaterally), rhonchi,

other (prolonged expiratory phase)


Percussion: Bilateral: not dull


Cardiovascular: regular rate and rhythm, other (irrreggular, S1,S2)


Gastrointestinal: normoactive bowel sounds, soft, non-tender, non-distended


Integumentary: rash, other (lower extemity edema +)


Extremities: no cyanosis, pink and warm, pulses normal, no ischemia or 

petechiae, edema


Neurologic: non-focal exam (moves all extemities), pupils equal and round, CN 

II-XII normal, other (lethargic)


Psychiatric: other (unable to assess secondary to mental status)


CBC and BMP: 


                                 05/27/20 05:09





                                 05/27/20 05:09


ABG, PT/INR, D-dimer: 


ABG











ABG pH  7.317 pH Units (7.350-7.450)  L  05/23/20  03:30    


 


ABG pCO2  55.1 mm Hg  05/23/20  03:30    


 


ABG pO2  73.4 mm Hg (80.0-90.0)  L  05/23/20  03:30    


 


ABG O2 Saturation  94.3 % (95.0-99.0)  L  05/23/20  03:30    





PT/INR, D-dimer











PT  15.3 Sec. (12.2-14.9)  H  05/17/20  03:25    


 


INR  1.20  (0.87-1.13)  H  05/17/20  03:25    








Abnormal lab findings: 


                                  Abnormal Labs











  05/16/20 05/16/20 05/17/20





  22:13 22:13 00:00


 


WBC   


 


Hct  43.2 H   45.8 H


 


MCV  107 H   106 H


 


MCH  34 H   33 H


 


RDW  20.2 H   19.7 H


 


Plt Count   


 


Lymph % (Auto)   


 


Lymph #   


 


Seg Neutrophils %  77.3 H   78.8 H


 


Seg Neuts % (Manual)   


 


Lymphocytes % (Manual)   


 


Nucleated RBC %   


 


Seg Neutrophils #   


 


Seg Neutrophils # Man   


 


Lymphocytes # (Manual)   


 


PT   


 


INR   


 


Heparin Anti-Xa Level   


 


ABG pH   


 


ABG pO2   


 


ABG HCO3   


 


ABG O2 Saturation   


 


Oxyhemoglobin   


 


Sodium   130 L 


 


Potassium   


 


Chloride   85.0 L 


 


Carbon Dioxide   35 H 


 


BUN   


 


Creatinine   


 


Glucose   112 H 


 


POC Glucose   


 


Calcium   


 


Phosphorus   


 


Total Bilirubin   


 


AST   


 


ALT   5 L 


 


Troponin T   


 


NT-Pro-B Natriuret Pep   3719 H 


 


Total Protein   


 


Albumin   3.4 L 


 


LDL Cholesterol Direct   


 


Urine Creatinine   


 


Urine Total Protein   














  05/17/20 05/17/20 05/17/20





  00:00 00:12 03:25


 


WBC   


 


Hct   


 


MCV   


 


MCH   


 


RDW   


 


Plt Count   


 


Lymph % (Auto)   


 


Lymph #   


 


Seg Neutrophils %   


 


Seg Neuts % (Manual)   


 


Lymphocytes % (Manual)   


 


Nucleated RBC %   


 


Seg Neutrophils #   


 


Seg Neutrophils # Man   


 


Lymphocytes # (Manual)   


 


PT   


 


INR   


 


Heparin Anti-Xa Level   


 


ABG pH   


 


ABG pO2   


 


ABG HCO3   


 


ABG O2 Saturation   


 


Oxyhemoglobin   


 


Sodium  131 L  


 


Potassium   


 


Chloride  85.3 L  


 


Carbon Dioxide  35 H  


 


BUN   


 


Creatinine   


 


Glucose  108 H  


 


POC Glucose   


 


Calcium   


 


Phosphorus   


 


Total Bilirubin   


 


AST   


 


ALT   


 


Troponin T   0.051 H D  0.113 H* D


 


NT-Pro-B Natriuret Pep   


 


Total Protein   


 


Albumin   


 


LDL Cholesterol Direct   33 L 


 


Urine Creatinine   


 


Urine Total Protein   














  05/17/20 05/17/20 05/17/20





  03:25 03:25 03:25


 


WBC   


 


Hct   


 


MCV  107 H  


 


MCH  34 H  


 


RDW  20.1 H  


 


Plt Count   


 


Lymph % (Auto)   


 


Lymph #   


 


Seg Neutrophils %  77.0 H  


 


Seg Neuts % (Manual)   


 


Lymphocytes % (Manual)   


 


Nucleated RBC %   


 


Seg Neutrophils #   


 


Seg Neutrophils # Man   


 


Lymphocytes # (Manual)   


 


PT   15.3 H 


 


INR   1.20 H 


 


Heparin Anti-Xa Level   


 


ABG pH   


 


ABG pO2   


 


ABG HCO3   


 


ABG O2 Saturation   


 


Oxyhemoglobin   


 


Sodium    133 L


 


Potassium   


 


Chloride    86.1 L


 


Carbon Dioxide    36 H


 


BUN   


 


Creatinine   


 


Glucose    116 H


 


POC Glucose   


 


Calcium   


 


Phosphorus   


 


Total Bilirubin   


 


AST   


 


ALT   


 


Troponin T   


 


NT-Pro-B Natriuret Pep   


 


Total Protein   


 


Albumin   


 


LDL Cholesterol Direct   


 


Urine Creatinine   


 


Urine Total Protein   














  05/17/20 05/17/20 05/18/20





  05:29 13:51 04:45


 


WBC   


 


Hct   


 


MCV    107 H


 


MCH    34 H


 


RDW    20.1 H


 


Plt Count    136 L


 


Lymph % (Auto)   


 


Lymph #   


 


Seg Neutrophils %   


 


Seg Neuts % (Manual)   


 


Lymphocytes % (Manual)   


 


Nucleated RBC %   


 


Seg Neutrophils #   


 


Seg Neutrophils # Man   


 


Lymphocytes # (Manual)   


 


PT   


 


INR   


 


Heparin Anti-Xa Level   0.14 L 


 


ABG pH   


 


ABG pO2   


 


ABG HCO3   


 


ABG O2 Saturation   


 


Oxyhemoglobin   


 


Sodium   


 


Potassium   


 


Chloride   


 


Carbon Dioxide   


 


BUN   


 


Creatinine   


 


Glucose   


 


POC Glucose   


 


Calcium   


 


Phosphorus   


 


Total Bilirubin   


 


AST   


 


ALT   


 


Troponin T  0.126 H*  


 


NT-Pro-B Natriuret Pep   


 


Total Protein   


 


Albumin   


 


LDL Cholesterol Direct   


 


Urine Creatinine   


 


Urine Total Protein   














  05/18/20 05/18/20 05/18/20





  04:45 16:23 16:23


 


WBC   


 


Hct   


 


MCV   


 


MCH   


 


RDW   


 


Plt Count   


 


Lymph % (Auto)   


 


Lymph #   


 


Seg Neutrophils %   


 


Seg Neuts % (Manual)   


 


Lymphocytes % (Manual)   


 


Nucleated RBC %   


 


Seg Neutrophils #   


 


Seg Neutrophils # Man   


 


Lymphocytes # (Manual)   


 


PT   


 


INR   


 


Heparin Anti-Xa Level   0.27 L 


 


ABG pH   


 


ABG pO2   


 


ABG HCO3   


 


ABG O2 Saturation   


 


Oxyhemoglobin   


 


Sodium  127 L   123 L


 


Potassium  5.1 H D  


 


Chloride  83.9 L  


 


Carbon Dioxide  32 H  


 


BUN   


 


Creatinine  1.3 H D  


 


Glucose  116 H  


 


POC Glucose   


 


Calcium  8.3 L  


 


Phosphorus   


 


Total Bilirubin   


 


AST   


 


ALT   


 


Troponin T   


 


NT-Pro-B Natriuret Pep   


 


Total Protein   


 


Albumin   


 


LDL Cholesterol Direct   


 


Urine Creatinine   


 


Urine Total Protein   














  05/18/20 05/19/20 05/19/20





  18:42 09:32 09:32


 


WBC   11.4 H 


 


Hct   


 


MCV   106 H 


 


MCH   33 H 


 


RDW   19.5 H 


 


Plt Count   131 L 


 


Lymph % (Auto)   


 


Lymph #   


 


Seg Neutrophils %   


 


Seg Neuts % (Manual)   


 


Lymphocytes % (Manual)   


 


Nucleated RBC %   


 


Seg Neutrophils #   


 


Seg Neutrophils # Man   


 


Lymphocytes # (Manual)   


 


PT   


 


INR   


 


Heparin Anti-Xa Level   


 


ABG pH   


 


ABG pO2   


 


ABG HCO3   


 


ABG O2 Saturation   


 


Oxyhemoglobin   


 


Sodium    128 L


 


Potassium   


 


Chloride    82.4 L


 


Carbon Dioxide    31 H


 


BUN    25 H


 


Creatinine    1.9 H


 


Glucose    61 L


 


POC Glucose  135 H  


 


Calcium    8.2 L


 


Phosphorus   


 


Total Bilirubin   


 


AST   


 


ALT   


 


Troponin T   


 


NT-Pro-B Natriuret Pep   


 


Total Protein   


 


Albumin   


 


LDL Cholesterol Direct   


 


Urine Creatinine   


 


Urine Total Protein   














  05/19/20 05/19/20 05/19/20





  13:37 19:24 19:33


 


WBC   


 


Hct   


 


MCV   


 


MCH   


 


RDW   


 


Plt Count   


 


Lymph % (Auto)   


 


Lymph #   


 


Seg Neutrophils %   


 


Seg Neuts % (Manual)   


 


Lymphocytes % (Manual)   


 


Nucleated RBC %   


 


Seg Neutrophils #   


 


Seg Neutrophils # Man   


 


Lymphocytes # (Manual)   


 


PT   


 


INR   


 


Heparin Anti-Xa Level  < 0.10 L  


 


ABG pH   


 


ABG pO2   


 


ABG HCO3   


 


ABG O2 Saturation   


 


Oxyhemoglobin   


 


Sodium   


 


Potassium   


 


Chloride   


 


Carbon Dioxide   


 


BUN   


 


Creatinine   


 


Glucose   


 


POC Glucose   < 40 L  > 500 H


 


Calcium   


 


Phosphorus   


 


Total Bilirubin   


 


AST   


 


ALT   


 


Troponin T   


 


NT-Pro-B Natriuret Pep   


 


Total Protein   


 


Albumin   


 


LDL Cholesterol Direct   


 


Urine Creatinine   


 


Urine Total Protein   














  05/19/20 05/19/20 05/19/20





  20:01 20:10 21:03


 


WBC   


 


Hct   


 


MCV   


 


MCH   


 


RDW   


 


Plt Count   


 


Lymph % (Auto)   


 


Lymph #   


 


Seg Neutrophils %   


 


Seg Neuts % (Manual)   


 


Lymphocytes % (Manual)   


 


Nucleated RBC %   


 


Seg Neutrophils #   


 


Seg Neutrophils # Man   


 


Lymphocytes # (Manual)   


 


PT   


 


INR   


 


Heparin Anti-Xa Level  < 0.10 L  


 


ABG pH   


 


ABG pO2   


 


ABG HCO3   


 


ABG O2 Saturation   


 


Oxyhemoglobin   


 


Sodium   124 L 


 


Potassium   5.5 H D 


 


Chloride   82.4 L 


 


Carbon Dioxide   


 


BUN   31 H 


 


Creatinine   2.1 H 


 


Glucose   212 H 


 


POC Glucose    202 H


 


Calcium   8.0 L 


 


Phosphorus   


 


Total Bilirubin   


 


AST   


 


ALT   


 


Troponin T   


 


NT-Pro-B Natriuret Pep   


 


Total Protein   


 


Albumin   


 


LDL Cholesterol Direct   


 


Urine Creatinine   


 


Urine Total Protein   














  05/19/20 05/19/20 05/19/20





  21:53 23:18 Unknown


 


WBC   


 


Hct   


 


MCV   


 


MCH   


 


RDW   


 


Plt Count   


 


Lymph % (Auto)   


 


Lymph #   


 


Seg Neutrophils %   


 


Seg Neuts % (Manual)   


 


Lymphocytes % (Manual)   


 


Nucleated RBC %   


 


Seg Neutrophils #   


 


Seg Neutrophils # Man   


 


Lymphocytes # (Manual)   


 


PT   


 


INR   


 


Heparin Anti-Xa Level   


 


ABG pH   


 


ABG pO2   


 


ABG HCO3   


 


ABG O2 Saturation   


 


Oxyhemoglobin   


 


Sodium   


 


Potassium   


 


Chloride   


 


Carbon Dioxide   


 


BUN   


 


Creatinine   


 


Glucose   


 


POC Glucose  177 H  203 H 


 


Calcium   


 


Phosphorus   


 


Total Bilirubin   


 


AST   


 


ALT   


 


Troponin T   


 


NT-Pro-B Natriuret Pep   


 


Total Protein   


 


Albumin   


 


LDL Cholesterol Direct   


 


Urine Creatinine    216.1 H


 


Urine Total Protein    62 H














  05/20/20 05/20/20 05/20/20





  00:15 02:20 04:34


 


WBC   


 


Hct   


 


MCV   


 


MCH   


 


RDW   


 


Plt Count   


 


Lymph % (Auto)   


 


Lymph #   


 


Seg Neutrophils %   


 


Seg Neuts % (Manual)   


 


Lymphocytes % (Manual)   


 


Nucleated RBC %   


 


Seg Neutrophils #   


 


Seg Neutrophils # Man   


 


Lymphocytes # (Manual)   


 


PT   


 


INR   


 


Heparin Anti-Xa Level   


 


ABG pH   


 


ABG pO2   


 


ABG HCO3   


 


ABG O2 Saturation   


 


Oxyhemoglobin   


 


Sodium    129 L


 


Potassium   


 


Chloride    83.7 L


 


Carbon Dioxide   


 


BUN    36 H


 


Creatinine    2.8 H


 


Glucose    129 H


 


POC Glucose  156 H  149 H 


 


Calcium    7.5 L


 


Phosphorus   


 


Total Bilirubin    2.30 H


 


AST    3636 H


 


ALT    1241 H


 


Troponin T   


 


NT-Pro-B Natriuret Pep   


 


Total Protein   


 


Albumin    3.2 L


 


LDL Cholesterol Direct   


 


Urine Creatinine   


 


Urine Total Protein   














  05/20/20 05/20/20 05/20/20





  04:34 05:19 12:13


 


WBC   


 


Hct   


 


MCV   


 


MCH   


 


RDW   


 


Plt Count   


 


Lymph % (Auto)   


 


Lymph #   


 


Seg Neutrophils %   


 


Seg Neuts % (Manual)   


 


Lymphocytes % (Manual)   


 


Nucleated RBC %   


 


Seg Neutrophils #   


 


Seg Neutrophils # Man   


 


Lymphocytes # (Manual)   


 


PT   


 


INR   


 


Heparin Anti-Xa Level  0.10 L  


 


ABG pH   


 


ABG pO2   


 


ABG HCO3   


 


ABG O2 Saturation   


 


Oxyhemoglobin   


 


Sodium   


 


Potassium   


 


Chloride   


 


Carbon Dioxide   


 


BUN   


 


Creatinine   


 


Glucose   


 


POC Glucose   115 H  69 L


 


Calcium   


 


Phosphorus   


 


Total Bilirubin   


 


AST   


 


ALT   


 


Troponin T   


 


NT-Pro-B Natriuret Pep   


 


Total Protein   


 


Albumin   


 


LDL Cholesterol Direct   


 


Urine Creatinine   


 


Urine Total Protein   














  05/20/20 05/20/20 05/20/20





  15:15 17:37 18:44


 


WBC   


 


Hct   


 


MCV   


 


MCH   


 


RDW   


 


Plt Count   


 


Lymph % (Auto)   


 


Lymph #   


 


Seg Neutrophils %   


 


Seg Neuts % (Manual)   


 


Lymphocytes % (Manual)   


 


Nucleated RBC %   


 


Seg Neutrophils #   


 


Seg Neutrophils # Man   


 


Lymphocytes # (Manual)   


 


PT   


 


INR   


 


Heparin Anti-Xa Level   


 


ABG pH  7.180 L*  


 


ABG pO2  70.1 L  


 


ABG HCO3  31.5 H  


 


ABG O2 Saturation  89.6 L  


 


Oxyhemoglobin  87.7 L  


 


Sodium   


 


Potassium   


 


Chloride   


 


Carbon Dioxide   


 


BUN   


 


Creatinine   


 


Glucose   


 


POC Glucose   68 L  160 H


 


Calcium   


 


Phosphorus   


 


Total Bilirubin   


 


AST   


 


ALT   


 


Troponin T   


 


NT-Pro-B Natriuret Pep   


 


Total Protein   


 


Albumin   


 


LDL Cholesterol Direct   


 


Urine Creatinine   


 


Urine Total Protein   














  05/20/20 05/20/20 05/20/20





  20:16 21:00 Unknown


 


WBC   


 


Hct   


 


MCV   


 


MCH   


 


RDW   


 


Plt Count   


 


Lymph % (Auto)   


 


Lymph #   


 


Seg Neutrophils %   


 


Seg Neuts % (Manual)   


 


Lymphocytes % (Manual)   


 


Nucleated RBC %   


 


Seg Neutrophils #   


 


Seg Neutrophils # Man   


 


Lymphocytes # (Manual)   


 


PT   


 


INR   


 


Heparin Anti-Xa Level   0.20 L  0.12 L


 


ABG pH  7.184 L*  


 


ABG pO2  77.9 L  


 


ABG HCO3  30.5 H  


 


ABG O2 Saturation  92.8 L  


 


Oxyhemoglobin  90.7 L  


 


Sodium   


 


Potassium   


 


Chloride   


 


Carbon Dioxide   


 


BUN   


 


Creatinine   


 


Glucose   


 


POC Glucose   


 


Calcium   


 


Phosphorus   


 


Total Bilirubin   


 


AST   


 


ALT   


 


Troponin T   


 


NT-Pro-B Natriuret Pep   


 


Total Protein   


 


Albumin   


 


LDL Cholesterol Direct   


 


Urine Creatinine   


 


Urine Total Protein   














  05/21/20 05/21/20 05/21/20





  00:06 00:08 05:35


 


WBC    15.0 H


 


Hct   


 


MCV    107 H


 


MCH    34 H


 


RDW    19.5 H


 


Plt Count    56 L


 


Lymph % (Auto)   


 


Lymph #   


 


Seg Neutrophils %   


 


Seg Neuts % (Manual)    92.0 H


 


Lymphocytes % (Manual)    5.0 L


 


Nucleated RBC %    4.0 H


 


Seg Neutrophils #   


 


Seg Neutrophils # Man    0.0 L


 


Lymphocytes # (Manual)    0.0 L


 


PT   


 


INR   


 


Heparin Anti-Xa Level   


 


ABG pH   7.230 L 


 


ABG pO2   79.8 L 


 


ABG HCO3   29.2 H 


 


ABG O2 Saturation   94.3 L 


 


Oxyhemoglobin   92.3 L 


 


Sodium   


 


Potassium   


 


Chloride   


 


Carbon Dioxide   


 


BUN   


 


Creatinine   


 


Glucose   


 


POC Glucose  185 H  


 


Calcium   


 


Phosphorus   


 


Total Bilirubin   


 


AST   


 


ALT   


 


Troponin T   


 


NT-Pro-B Natriuret Pep   


 


Total Protein   


 


Albumin   


 


LDL Cholesterol Direct   


 


Urine Creatinine   


 


Urine Total Protein   














  05/21/20 05/21/20 05/21/20





  05:35 06:07 12:33


 


WBC   


 


Hct   


 


MCV   


 


MCH   


 


RDW   


 


Plt Count   


 


Lymph % (Auto)   


 


Lymph #   


 


Seg Neutrophils %   


 


Seg Neuts % (Manual)   


 


Lymphocytes % (Manual)   


 


Nucleated RBC %   


 


Seg Neutrophils #   


 


Seg Neutrophils # Man   


 


Lymphocytes # (Manual)   


 


PT   


 


INR   


 


Heparin Anti-Xa Level   


 


ABG pH   


 


ABG pO2   


 


ABG HCO3   


 


ABG O2 Saturation   


 


Oxyhemoglobin   


 


Sodium  130 L  


 


Potassium   


 


Chloride  85.2 L  


 


Carbon Dioxide   


 


BUN  49 H  


 


Creatinine  3.4 H  


 


Glucose  157 H  


 


POC Glucose   158 H  170 H


 


Calcium  6.7 L  


 


Phosphorus  7.00 H  


 


Total Bilirubin  2.10 H  


 


AST  2625 H  


 


ALT  1547 H  


 


Troponin T   


 


NT-Pro-B Natriuret Pep   


 


Total Protein  6.2 L  


 


Albumin  3.3 L  


 


LDL Cholesterol Direct   


 


Urine Creatinine   


 


Urine Total Protein   














  05/21/20 05/22/20 05/22/20





  18:26 00:26 04:30


 


WBC    12.3 H


 


Hct    43.6 H


 


MCV    104 H


 


MCH    33 H


 


RDW    20.4 H


 


Plt Count    44 L


 


Lymph % (Auto)   


 


Lymph #   


 


Seg Neutrophils %   


 


Seg Neuts % (Manual)    89.0 H


 


Lymphocytes % (Manual)    7.0 L


 


Nucleated RBC %   


 


Seg Neutrophils #   


 


Seg Neutrophils # Man    10.9 H


 


Lymphocytes # (Manual)    0.9 L


 


PT   


 


INR   


 


Heparin Anti-Xa Level   


 


ABG pH   


 


ABG pO2   


 


ABG HCO3   


 


ABG O2 Saturation   


 


Oxyhemoglobin   


 


Sodium   


 


Potassium   


 


Chloride   


 


Carbon Dioxide   


 


BUN   


 


Creatinine   


 


Glucose   


 


POC Glucose  172 H  171 H 


 


Calcium   


 


Phosphorus   


 


Total Bilirubin   


 


AST   


 


ALT   


 


Troponin T   


 


NT-Pro-B Natriuret Pep   


 


Total Protein   


 


Albumin   


 


LDL Cholesterol Direct   


 


Urine Creatinine   


 


Urine Total Protein   














  05/22/20 05/22/20 05/22/20





  04:30 06:16 15:45


 


WBC   


 


Hct   


 


MCV   


 


MCH   


 


RDW   


 


Plt Count   


 


Lymph % (Auto)   


 


Lymph #   


 


Seg Neutrophils %   


 


Seg Neuts % (Manual)   


 


Lymphocytes % (Manual)   


 


Nucleated RBC %   


 


Seg Neutrophils #   


 


Seg Neutrophils # Man   


 


Lymphocytes # (Manual)   


 


PT   


 


INR   


 


Heparin Anti-Xa Level   


 


ABG pH    7.310 L


 


ABG pO2    73.7 L


 


ABG HCO3    27.2 H


 


ABG O2 Saturation    94.2 L


 


Oxyhemoglobin    92.1 L


 


Sodium  132 L  


 


Potassium   


 


Chloride  88.4 L  


 


Carbon Dioxide   


 


BUN  61 H  


 


Creatinine  2.9 H  


 


Glucose  168 H  


 


POC Glucose   179 H 


 


Calcium  6.9 L  


 


Phosphorus   


 


Total Bilirubin  1.80 H  


 


AST  967 H  


 


ALT  1094 H  


 


Troponin T   


 


NT-Pro-B Natriuret Pep   


 


Total Protein  6.1 L  


 


Albumin  2.9 L  


 


LDL Cholesterol Direct   


 


Urine Creatinine   


 


Urine Total Protein   














  05/23/20 05/23/20 05/23/20





  00:26 03:30 05:15


 


WBC   


 


Hct    43.9 H


 


MCV    105 H


 


MCH    34 H


 


RDW    20.2 H


 


Plt Count    30 L


 


Lymph % (Auto)    5.2 L


 


Lymph #    0.5 L


 


Seg Neutrophils %    89.7 H


 


Seg Neuts % (Manual)   


 


Lymphocytes % (Manual)   


 


Nucleated RBC %   


 


Seg Neutrophils #    9.4 H


 


Seg Neutrophils # Man   


 


Lymphocytes # (Manual)   


 


PT   


 


INR   


 


Heparin Anti-Xa Level   


 


ABG pH   7.317 L 


 


ABG pO2   73.4 L 


 


ABG HCO3   27.6 H 


 


ABG O2 Saturation   94.3 L 


 


Oxyhemoglobin   92.3 L 


 


Sodium   


 


Potassium   


 


Chloride   


 


Carbon Dioxide   


 


BUN   


 


Creatinine   


 


Glucose   


 


POC Glucose  204 H  


 


Calcium   


 


Phosphorus   


 


Total Bilirubin   


 


AST   


 


ALT   


 


Troponin T   


 


NT-Pro-B Natriuret Pep   


 


Total Protein   


 


Albumin   


 


LDL Cholesterol Direct   


 


Urine Creatinine   


 


Urine Total Protein   














  05/23/20 05/24/20 05/24/20





  05:15 03:15 11:51


 


WBC   


 


Hct   


 


MCV   


 


MCH   


 


RDW   


 


Plt Count   


 


Lymph % (Auto)   


 


Lymph #   


 


Seg Neutrophils %   


 


Seg Neuts % (Manual)   


 


Lymphocytes % (Manual)   


 


Nucleated RBC %   


 


Seg Neutrophils #   


 


Seg Neutrophils # Man   


 


Lymphocytes # (Manual)   


 


PT   


 


INR   


 


Heparin Anti-Xa Level   


 


ABG pH   


 


ABG pO2   


 


ABG HCO3   


 


ABG O2 Saturation   


 


Oxyhemoglobin   


 


Sodium  136 L  135 L 


 


Potassium   


 


Chloride  93.1 L  93.5 L 


 


Carbon Dioxide   


 


BUN  65 H  73 H 


 


Creatinine  2.3 H  2.2 H 


 


Glucose  148 H  128 H 


 


POC Glucose    169 H


 


Calcium  7.5 L  8.2 L 


 


Phosphorus   


 


Total Bilirubin  1.80 H  1.60 H 


 


AST  322 H  159 H 


 


ALT  723 H  514 H 


 


Troponin T   


 


NT-Pro-B Natriuret Pep   


 


Total Protein  6.0 L  5.7 L 


 


Albumin  2.9 L  2.7 L 


 


LDL Cholesterol Direct   


 


Urine Creatinine   


 


Urine Total Protein   














  05/24/20 05/25/20 05/25/20





  17:41 04:23 04:23


 


WBC   


 


Hct   


 


MCV   


 


MCH   


 


RDW   


 


Plt Count   25 L 


 


Lymph % (Auto)   


 


Lymph #   


 


Seg Neutrophils %   


 


Seg Neuts % (Manual)   


 


Lymphocytes % (Manual)   


 


Nucleated RBC %   


 


Seg Neutrophils #   


 


Seg Neutrophils # Man   


 


Lymphocytes # (Manual)   


 


PT   


 


INR   


 


Heparin Anti-Xa Level   


 


ABG pH   


 


ABG pO2   


 


ABG HCO3   


 


ABG O2 Saturation   


 


Oxyhemoglobin   


 


Sodium    135 L


 


Potassium   


 


Chloride    94.7 L


 


Carbon Dioxide   


 


BUN    91 H


 


Creatinine    2.3 H


 


Glucose    165 H


 


POC Glucose  158 H  


 


Calcium   


 


Phosphorus   


 


Total Bilirubin   


 


AST    72 H


 


ALT    362 H


 


Troponin T   


 


NT-Pro-B Natriuret Pep   


 


Total Protein    5.9 L


 


Albumin    2.9 L


 


LDL Cholesterol Direct   


 


Urine Creatinine   


 


Urine Total Protein   














  05/25/20 05/25/20 05/26/20





  11:32 16:58 00:06


 


WBC   


 


Hct   


 


MCV   


 


MCH   


 


RDW   


 


Plt Count   


 


Lymph % (Auto)   


 


Lymph #   


 


Seg Neutrophils %   


 


Seg Neuts % (Manual)   


 


Lymphocytes % (Manual)   


 


Nucleated RBC %   


 


Seg Neutrophils #   


 


Seg Neutrophils # Man   


 


Lymphocytes # (Manual)   


 


PT   


 


INR   


 


Heparin Anti-Xa Level   


 


ABG pH   


 


ABG pO2   


 


ABG HCO3   


 


ABG O2 Saturation   


 


Oxyhemoglobin   


 


Sodium   


 


Potassium   


 


Chloride   


 


Carbon Dioxide   


 


BUN   


 


Creatinine   


 


Glucose   


 


POC Glucose  219 H  187 H  175 H


 


Calcium   


 


Phosphorus   


 


Total Bilirubin   


 


AST   


 


ALT   


 


Troponin T   


 


NT-Pro-B Natriuret Pep   


 


Total Protein   


 


Albumin   


 


LDL Cholesterol Direct   


 


Urine Creatinine   


 


Urine Total Protein   














  05/26/20 05/26/20 05/26/20





  05:59 10:04 12:39


 


WBC   


 


Hct   


 


MCV   


 


MCH   


 


RDW   


 


Plt Count   


 


Lymph % (Auto)   


 


Lymph #   


 


Seg Neutrophils %   


 


Seg Neuts % (Manual)   


 


Lymphocytes % (Manual)   


 


Nucleated RBC %   


 


Seg Neutrophils #   


 


Seg Neutrophils # Man   


 


Lymphocytes # (Manual)   


 


PT   


 


INR   


 


Heparin Anti-Xa Level   


 


ABG pH   


 


ABG pO2   


 


ABG HCO3   


 


ABG O2 Saturation   


 


Oxyhemoglobin   


 


Sodium   


 


Potassium   


 


Chloride   97.2 L 


 


Carbon Dioxide   


 


BUN   110 H 


 


Creatinine   2.4 H 


 


Glucose   205 H 


 


POC Glucose  194 H   198 H


 


Calcium   


 


Phosphorus   


 


Total Bilirubin   


 


AST   


 


ALT   228 H 


 


Troponin T   


 


NT-Pro-B Natriuret Pep   


 


Total Protein   5.3 L 


 


Albumin   2.8 L 


 


LDL Cholesterol Direct   


 


Urine Creatinine   


 


Urine Total Protein   











Allied health notes reviewed: nursing

## 2020-05-26 NOTE — PROGRESS NOTE
Assessment and Plan





/Metabolic encephalopathy; lethergy


likely due to MILAN, respiratory failure, 


cont to treat the underlying cause, supportive care


Nutrition; tube feeding as patient is lethargic


Speech and swallow evaluation pending





/Acute on chronic hypoxic respiratory failure; on continuous BiPAP


Titrate O2 sats to more than 90%, nebulizers


IV steroids, pulmonary following,





/Hypotension shock - required vasopressors;


Off vasopressors, stable blood pressures





/Severe pulmonary hypertension; on echo


due to Cor pulmonale


Continue current management, pulmonary following





/Acute exacerbation of COPD; on home oxygen


cont high flow Oxygen, nebulizers, IV tapering steroids.





/Acute on chronic systolic congestive heart failure; EF 20 to 25%


Continue heart failure medications, input output monitoring


Low-sodium diet, fluid restriction, cardiology following





/Atrial flutter with variable conduction;


On Cardizem, chronic anticoagulation per cardiology


Sotalol discontinued by cardiology due to bradycardia 





/Severe thrombocytopenia; Heparin drip discontinued


HIT panel ordered, pending report





/Acute transaminitis; shock liver due to liver congestion


Due to cardiomyopathy  And hypotension 


Trending down





/Acute kidney injury; due to ATN


Management per nephrology, avoid nephrotoxins


creatinine 0.7->1.3->1.9->2.8->3.4->2.9->2.3/2.4 with renal function stable past

2-3 days.  


Suspect this is related to tubular injury in setting of hypotension





/Hyponatremia; mild improvement


Secondary to fluid overload, cautious Diuretic therapy, follow electrolytes, 

nephrology following





/Symptomatic hypoglycemia; [5/19/2020]


Received D50, D10 IV fluids, significantly improved


Closely monitor blood sugars adjust as needed





/Generalized anasarca/edema


Due to cardiomyopathy, severe pulmonary hypertension


renal failure.  Treat the underlying cause





/-Morbid obesity; BMI 38.6


Partly due to fluid overload


Continue supportive care





/-DVT prophylaxis;


s/p  heparin drip





--Full CODE STATUS.





Patient has multiple medical problems


Critically ill, poor prognosis


Plan of care reviewed with the patient'S nurse


Tube feeding per protocol





Critical care time 35 minutes








Brief history:


This is a 70-year-old woman with known history of COPD, HTN, and CHF presenting 

with lower extremity swelling, chest pain.  Patient is on home oxygen for COPD 

and she is also on Lasix for CHF, but was out of Lasix prior to admission. 

Admitted with acute hypoxic respiratory failure and COPD exacerbation as well as

 acute on chronic systolic congestive heart failure, fluid overload, pulmonary 

cardiology nephrology following the patient. She was restarted on diuretics, and

 was later found to have MILAN and hyponatremia prompting nephrology consultation.

 Patient was maintained on BiPAP and tube feeding, then BiPAP is weaned off, now

 on high-dose nasal cannula oxygen.  Had shock liver with LFTs in thousands, 

slowly trending down, Patient is obese, encephalopathic with poor prognosis. 





Hospitalist Physical





General appearance: Present: mild distress, well-nourished, obese (Morbidly 

obese), other (On high flow NC oxygen, lethergic)





- EENT


Eyes: Present: PERRL, EOM intact





- Neck


Neck: Present: supple, normal ROM





- Respiratory


Respiratory effort: normal


Respiratory: bilateral: diminished, rales, negative: rhonchi, wheezing





- Cardiovascular


Rhythm: regular


Heart Sounds: Present: S1 & S2





- Extremities


Extremities: no ischemia


Extremity abnormal: edema





- Abdominal


General gastrointestinal: soft, non-tender, non-distended, normal bowel sounds





- Integumentary


Integumentary: Present: clear, warm





- Psychiatric


Psychiatric: other (Confused noncommunicative)





- Neurologic


Neurologic: moves all extremities (Confused noncommunicative)








Subjective


Date of service: 05/26/20


Principal diagnosis: Ac hypoxemic and hypercapnic resp failure; AE-COPD; NSTEMI;

 MILAN; HFrEF


Interval history: 





Patient seen and examined.  Medical records and medication list reviewed.  


No acute event overnight noted by the RN.  


Patient remained on high flow O2, on tube feeding, very lethargic


Discussed plan of care at bedside with patient's RN an SUGEY.








Objective





- Constitutional


Vitals: 


                               Vital Signs - 12hr











  05/26/20 05/26/20 05/26/20





  05:00 06:01 07:00


 


Temperature   


 


Pulse Rate 93 H  111 H


 


Pulse Rate [   





Bilateral   





Throughout]   


 


Pulse Rate [   





From Monitor]   


 


Respiratory 22  22





Rate   


 


Respiratory   





Rate [Bilateral   





Throughout]   


 


Blood Pressure 131/82 123/100 123/80


 


O2 Sat by Pulse 95  





Oximetry   














  05/26/20 05/26/20 05/26/20





  07:08 08:00 09:00


 


Temperature  98.0 F 


 


Pulse Rate 84 85 107 H


 


Pulse Rate [   





Bilateral   





Throughout]   


 


Pulse Rate [  100 H 





From Monitor]   


 


Respiratory  21 24





Rate   


 


Respiratory   





Rate [Bilateral   





Throughout]   


 


Blood Pressure 112/74 108/60 117/70


 


O2 Sat by Pulse  95 95





Oximetry   














  05/26/20 05/26/20 05/26/20





  09:52 10:00 11:00


 


Temperature   


 


Pulse Rate  109 H 114 H


 


Pulse Rate [ 112 H  





Bilateral   





Throughout]   


 


Pulse Rate [   





From Monitor]   


 


Respiratory  21 20





Rate   


 


Respiratory 23  





Rate [Bilateral   





Throughout]   


 


Blood Pressure  115/74 107/75


 


O2 Sat by Pulse  95 93





Oximetry   














  05/26/20 05/26/20 05/26/20





  12:00 13:00 14:00


 


Temperature 98.4 F  


 


Pulse Rate 105 H 103 H 116 H


 


Pulse Rate [   





Bilateral   





Throughout]   


 


Pulse Rate [ 102 H  





From Monitor]   


 


Respiratory 19 25 H 26 H





Rate   


 


Respiratory   





Rate [Bilateral   





Throughout]   


 


Blood Pressure 107/75 105/75 105/75


 


O2 Sat by Pulse 94 93 93





Oximetry   














  05/26/20 05/26/20 05/26/20





  14:54 15:00 16:00


 


Temperature   


 


Pulse Rate  102 H 108 H


 


Pulse Rate [   





Bilateral   





Throughout]   


 


Pulse Rate [   110 H





From Monitor]   


 


Respiratory  17 24





Rate   


 


Respiratory   





Rate [Bilateral   





Throughout]   


 


Blood Pressure  96/64 118/88


 


O2 Sat by Pulse 95  95





Oximetry   














- Labs


CBC & Chem 7: 


                                 05/27/20 05:09





                                 05/27/20 05:09


Labs: 


                              Abnormal lab results











  05/25/20 05/26/20 05/26/20 Range/Units





  16:58 00:06 05:59 


 


WBC     (4.5-11.0)  K/mm3


 


Hgb     (10.1-14.3)  gm/dl


 


Hct     (30.3-42.9)  %


 


MCV     (79-97)  fl


 


MCH     (28-32)  pg


 


RDW     (13.2-15.2)  %


 


Chloride     ()  mmol/L


 


BUN     (7-17)  mg/dL


 


Creatinine     (0.7-1.2)  mg/dL


 


Glucose     ()  mg/dL


 


POC Glucose  187 H  175 H  194 H  ()  


 


ALT     (7-56)  units/L


 


Total Protein     (6.3-8.2)  g/dL


 


Albumin     (3.9-5)  g/dL














  05/26/20 05/26/20 05/26/20 Range/Units





  10:04 12:39 15:05 


 


WBC    21.0 H  (4.5-11.0)  K/mm3


 


Hgb    14.6 H D  (10.1-14.3)  gm/dl


 


Hct    45.8 H D  (30.3-42.9)  %


 


MCV    102 H  (79-97)  fl


 


MCH    33 H  (28-32)  pg


 


RDW    21.0 H  (13.2-15.2)  %


 


Chloride  97.2 L    ()  mmol/L


 


BUN  110 H    (7-17)  mg/dL


 


Creatinine  2.4 H    (0.7-1.2)  mg/dL


 


Glucose  205 H    ()  mg/dL


 


POC Glucose   198 H   ()  


 


ALT  228 H    (7-56)  units/L


 


Total Protein  5.3 L    (6.3-8.2)  g/dL


 


Albumin  2.8 L    (3.9-5)  g/dL














HEART Score





- HEART Score


EKG: Non-specific


Age: > 65


Risk factors: > 3 risk factors or hx of atherosclerotic disease


Troponin: 


                                        











Troponin T  0.126 ng/mL (0.00-0.029)  H*  05/17/20  05:29    











Troponin: < normal limit





- Critical Actions


Critical Actions: 4-6 pts:12-16.6% risk of adverse cardiac event. Should be 

admitted

## 2020-05-27 VITALS — DIASTOLIC BLOOD PRESSURE: 84 MMHG | SYSTOLIC BLOOD PRESSURE: 119 MMHG

## 2020-05-27 LAB
ANISOCYTOSIS BLD QL SMEAR: (no result)
BAND NEUTROPHILS # (MANUAL): 0 K/MM3
BUN SERPL-MCNC: 130 MG/DL (ref 7–17)
BUN/CREAT SERPL: 57 %
CALCIUM SERPL-MCNC: 8.8 MG/DL (ref 8.4–10.2)
HCO3 BLDA-SCNC: 27.2 MMOL/L (ref 20–26)
HCT VFR BLD CALC: 48.1 % (ref 30.3–42.9)
HEMOLYSIS INDEX: 87
HGB BLD-MCNC: 15.3 GM/DL (ref 10.1–14.3)
MCHC RBC AUTO-ENTMCNC: 32 % (ref 30–34)
MCV RBC AUTO: 105 FL (ref 79–97)
MYELOCYTES # (MANUAL): 0 K/MM3
PCO2 BLDA: 58.1 MM HG
PH BLDA: 7.29 PH UNITS (ref 7.35–7.45)
PLATELET # BLD: 31 K/MM3 (ref 140–440)
PO2 BLDA: 50.6 MM HG (ref 80–90)
PROMYELOCYTES # (MANUAL): 0 K/MM3
RBC # BLD AUTO: 4.57 M/MM3 (ref 3.65–5.03)
TOTAL CELLS COUNTED BLD: 100

## 2020-05-27 PROCEDURE — 5A09357 ASSISTANCE WITH RESPIRATORY VENTILATION, LESS THAN 24 CONSECUTIVE HOURS, CONTINUOUS POSITIVE AIRWAY PRESSURE: ICD-10-PCS | Performed by: INTERNAL MEDICINE

## 2020-05-27 PROCEDURE — 0BH17EZ INSERTION OF ENDOTRACHEAL AIRWAY INTO TRACHEA, VIA NATURAL OR ARTIFICIAL OPENING: ICD-10-PCS | Performed by: INTERNAL MEDICINE

## 2020-05-27 PROCEDURE — 5A1935Z RESPIRATORY VENTILATION, LESS THAN 24 CONSECUTIVE HOURS: ICD-10-PCS | Performed by: INTERNAL MEDICINE

## 2020-05-27 RX ADMIN — IPRATROPIUM BROMIDE AND ALBUTEROL SULFATE SCH AMPUL: .5; 3 SOLUTION RESPIRATORY (INHALATION) at 18:04

## 2020-05-27 RX ADMIN — FAMOTIDINE SCH MG: 20 TABLET ORAL at 09:16

## 2020-05-27 RX ADMIN — Medication SCH ML: at 09:17

## 2020-05-27 RX ADMIN — BUDESONIDE SCH MG: 0.5 INHALANT RESPIRATORY (INHALATION) at 08:35

## 2020-05-27 RX ADMIN — IPRATROPIUM BROMIDE AND ALBUTEROL SULFATE SCH: .5; 3 SOLUTION RESPIRATORY (INHALATION) at 08:36

## 2020-05-27 RX ADMIN — ASPIRIN SCH MG: 325 TABLET, COATED ORAL at 09:16

## 2020-05-27 RX ADMIN — METHYLPREDNISOLONE SODIUM SUCCINATE SCH MG: 40 INJECTION, POWDER, FOR SOLUTION INTRAMUSCULAR; INTRAVENOUS at 09:16

## 2020-05-27 RX ADMIN — ARFORMOTEROL TARTRATE SCH MCG: 15 SOLUTION RESPIRATORY (INHALATION) at 08:35

## 2020-05-27 NOTE — PROGRESS NOTE
Assessment and Plan





This is a 70 year old woman with CHF, COPD who presents with shortness of 

breath, chest pain now with MILAN, hyponatremia





# Acute Kidney Injury:  creatinine 0.7->1.3->1.9->2.8->3.4->2.9->2.3/2.4 with 

renal function stable past 2-3 days.  Suspect this is related to tubular injury 

in setting of hypotension, with changes due to fluid shifting with diuretic 

therapy and cardiorenal physiology.  Off sotalol. Continue to be cautious with 

diuretic therapy and defer to pulm/cardiology to use prn based on respiratory 

status.  No indication for renal replacement therapy at this time, will follow 

closely given clinical status; is at high risk for requiring renal replacement 

therapy but stable with improved MAP.


- daily renal labs


- avoid nephrotoxins


- low salt diet


- strict Is/Os


- maintain MAP >70, continue pressors prn 


- supportive measures per ICU





# Azotemia: likely due to MILAN and steroid use; may have signs of uremia given 

confusion but no indication for renal replacement therapy with stable 

creatinine, reasonable urine output 





# Hyponatremia: suspect hyponatremia related to volume overload, 


 Ok to use furosemide for volume prn only as noted above; furosemide should 

promote more water loss than sodium, but still need to monitor closely for 

worsening of hyponatremia





# Respiratory Acidosis: suspect related to underlying pulmonary disease; 

improved now





# NSTEMI/ CHF/ atrial flutter: appreciate cardiology input





# Shortness of Breath, Respiratory Distress, Pulmonary Hypertension, COPD: 

appreciate pulmonary input





# Anemia of chronic disease 





# Transaminitis





ok to dc to [facility]





Subjective


Date of service: 05/27/20


Principal diagnosis: Ac hypoxemic and hypercapnic resp failure; AE-COPD; NSTEMI;

MILAN; HFrEF


Interval history: 





resting in bed





Objective





- Exam


Narrative Exam: 











Constitutional: no acute distress, laying in bed comfortably, minimally 

responsive


Head: NC/AT


Neck: supple


Lungs: coarse breath sounds


CV: RRR, no M/R/G


Abdomen: soft, non-tender, bowel sounds present


Back: nontender


Extremities: no edema, pulses WNL


Skin: intact


Neuro: confused, no clear thoughts noted





- Vital Signs


Vital signs: 


                               Vital Signs - 12hr











  05/27/20 05/27/20 05/27/20





  03:01 04:00 04:01


 


Temperature  96.0 F L 


 


Pulse Rate 110 H 128 H 127 H


 


Pulse Rate [   





Bilateral   





Throughout]   


 


Pulse Rate [  128 H 





From Monitor]   


 


Respiratory 26 H 27 H 17





Rate   


 


Respiratory   





Rate [Bilateral   





Throughout]   


 


Blood Pressure 150/87  113/86


 


O2 Sat by Pulse 94 96 96





Oximetry   














  05/27/20 05/27/20 05/27/20





  04:30 05:01 05:46


 


Temperature   


 


Pulse Rate 120 H 128 H 125 H


 


Pulse Rate [   





Bilateral   





Throughout]   


 


Pulse Rate [   





From Monitor]   


 


Respiratory 33 H 24 





Rate   


 


Respiratory   





Rate [Bilateral   





Throughout]   


 


Blood Pressure 113/86 114/85 122/95


 


O2 Sat by Pulse 95 95 





Oximetry   














  05/27/20 05/27/20 05/27/20





  06:01 07:00 08:00


 


Temperature   97.9 F


 


Pulse Rate 128 H 127 H 128 H


 


Pulse Rate [   126 H





Bilateral   





Throughout]   


 


Pulse Rate [   





From Monitor]   


 


Respiratory 29 H 26 H 22





Rate   


 


Respiratory   31 H





Rate [Bilateral   





Throughout]   


 


Blood Pressure 162/92 140/92 125/86


 


O2 Sat by Pulse 93 94 94





Oximetry   














  05/27/20 05/27/20 05/27/20





  09:00 09:17 10:00


 


Temperature   


 


Pulse Rate 126 H  125 H


 


Pulse Rate [   





Bilateral   





Throughout]   


 


Pulse Rate [  126 H 





From Monitor]   


 


Respiratory 27 H 27 H 25 H





Rate   


 


Respiratory   





Rate [Bilateral   





Throughout]   


 


Blood Pressure 121/78  112/79


 


O2 Sat by Pulse 93 93 93





Oximetry   














  05/27/20 05/27/20 05/27/20





  11:00 12:00 12:43


 


Temperature  98.0 F 


 


Pulse Rate 125 H 125 H 120 H


 


Pulse Rate [   





Bilateral   





Throughout]   


 


Pulse Rate [  125 H 





From Monitor]   


 


Respiratory 25 H 32 H 





Rate   


 


Respiratory   





Rate [Bilateral   





Throughout]   


 


Blood Pressure 120/82 128/91 128/91


 


O2 Sat by Pulse 92 91 





Oximetry   














  05/27/20 05/27/20 05/27/20





  13:00 13:02 14:01


 


Temperature   


 


Pulse Rate 127 H 127 H 126 H


 


Pulse Rate [   





Bilateral   





Throughout]   


 


Pulse Rate [   





From Monitor]   


 


Respiratory 25 H  36 H





Rate   


 


Respiratory   





Rate [Bilateral   





Throughout]   


 


Blood Pressure 122/98 122/98 147/93


 


O2 Sat by Pulse 92  90





Oximetry   














- Lab





                                 05/27/20 05:09





                                 05/27/20 05:09


                             Most recent lab results











ABG pH  7.317 pH Units (7.350-7.450)  L  05/23/20  03:30    


 


ABG pCO2  55.1 mm Hg  05/23/20  03:30    


 


ABG pO2  73.4 mm Hg (80.0-90.0)  L  05/23/20  03:30    


 


ABG HCO3  27.6 mmol/L (20.0-26.0)  H  05/23/20  03:30    


 


ABG O2 Saturation  94.3 % (95.0-99.0)  L  05/23/20  03:30    


 


Calcium  8.8 mg/dL (8.4-10.2)   05/27/20  05:09    


 


Phosphorus  7.00 mg/dL (2.5-4.5)  H  05/21/20  05:35    


 


Magnesium  2.10 mg/dL (1.7-2.3)   05/26/20  10:04    


 


Urine Creatinine  216.1 mg/dL (0.1-20.0)  H  05/19/20  Unknown


 


Urine Sodium  10 mmol/L  05/19/20  Unknown


 


Urine Total Protein  62 mg/dL (5-11.8)  H  05/19/20  Unknown














Medications & Allergies





- Medications


Allergies/Adverse Reactions: 


                                    Allergies





No Known Allergies Allergy (Unverified 05/13/18 11:23)


   








Home Medications: 


                                Home Medications











 Medication  Instructions  Recorded  Confirmed  Last Taken  Type


 


No Known Home Medications [No  05/16/20 05/16/20 Unknown History





Reported Home Medications]     











Active Medications: 














Generic Name Dose Route Start Last Admin





  Trade Name Freq  PRN Reason Stop Dose Admin


 


Acetaminophen  650 mg  05/16/20 23:45  05/23/20 21:37





  Tylenol  PO   650 mg





  Q4H PRN   Administration





  Pain MILD(1-3)/Fever >100.5/HA  


 


Albuterol  2.5 mg  05/17/20 14:57 





  Proventil  IH  





  Q4HRT PRN  





  Shortness Of Breath  


 


Albuterol/Ipratropium  1 ampul  05/17/20 20:00  05/27/20 08:36





  Duoneb *Not For Prn Use*  IH   Not Given





  TIDRT [facility]  


 


Lipase/Protease/Amylase  1 each  05/20/20 15:33 





  Pancreazrajat Cloud 10,500 Unit  FEEDTUBE  





  PRN PRN  





  For Clogged Feeding Tube  


 


Apixaban  2.5 mg  05/27/20 11:00  05/27/20 12:43





  Eliquis  PO   2.5 mg





  Q12HR AMANDA   Administration





  Protocol  


 


Arformoterol Tartrate  15 mcg  05/17/20 20:00  05/27/20 08:35





  Brovana Nebu  IH   15 mcg





  Q12HRT AMANDA   Administration


 


Aspirin  81 mg  05/28/20 10:00 





  Halfprin Ec  PO  





  QDAY [facility]  


 


Atorvastatin Calcium  40 mg  05/17/20 22:00  05/26/20 22:47





  Lipitor  PO   40 mg





  QHS AMANDA   Administration


 


Budesonide  0.5 mg  05/17/20 20:00  05/27/20 08:35





  Pulmicort  IH   0.5 mg





  Q12HRT AMANDA   Administration


 


Dextrose  50 ml  05/20/20 17:44 





  D50w (25gm) Syringe  IV  





  Q30MIN PRN  





  Hypoglycemia  





  Protocol  


 


Digoxin  0.125 mg  05/29/20 17:00 





  Lanoxin  PO  





  Q48H AMANDA  


 


Diltiazem HCl  30 mg  05/27/20 14:00  05/27/20 13:02





  Cardizem  PO   30 mg





  Q8HR AMANDA   Administration


 


Famotidine  20 mg  05/25/20 10:00  05/27/20 09:16





  Pepcid  PO   20 mg





  DAILY [facility]   Administration


 


Methylprednisolone Sodium Succinate  40 mg  05/21/20 10:00  05/27/20 09:16





  Solu-Medrol  IV   40 mg





  Q12HR [facility]   Administration


 


Morphine Sulfate  2 mg  05/16/20 23:45 





  Morphine  IV  





  Q5MIN PRN  





  Chest Pain unrelieved by NTG  


 


Nitroglycerin  0.4 mg  05/16/20 23:45 





  Nitrostat  SL  





  .Q5MIN PRN  





  Chest Pain  


 


Ondansetron HCl  4 mg  05/16/20 23:45 





  Zofran  IV  





  Q8H PRN  





  Nausea And Vomiting  


 


Simple Syrup  15 ml  05/20/20 15:33 





  Simple Syrup  FEEDTUBE  





  PRN PRN  





  Hypoglycemia  


 


Simple Syrup  30 ml  05/20/20 15:33 





  Simple Syrup  FEEDTUBE  





  PRN PRN  





  Hypoglycemia  


 


Sodium Bicarbonate  325 mg  05/20/20 15:33 





  Sodium Bicarbonate  FEEDTUBE  





  PRN PRN  





  For Clogged Feeding Tube  


 


Sodium Chloride  10 ml  05/17/20 10:00  05/27/20 09:17





  Sodium Chloride Flush Syringe 10 Ml  IV   10 ml





  BID AMANDA   Administration


 


Sodium Chloride  10 ml  05/16/20 23:45 





  Sodium Chloride Flush Syringe 10 Ml  IV  





  PRN PRN  





  LINE FLUSH

## 2020-05-27 NOTE — PROGRESS NOTE
Assessment and Plan





- Patient Problems


(1) Atrial flutter


Current Visit: Yes   Status: Acute   


Plan to address problem: 


We will add digoxin for atrial flutter rate control, and digoxin will be dosed 

for her underlying chronic kidney disease.  Oral anticoagulation will be started

with Eliquis 2.5 mg twice daily.








(2) Cor pulmonale


Current Visit: Yes   Status: Acute   


Plan to address problem: 


Patient has severe pulmonary hypertension with pulmonary artery systolic 

pressure of 82, consistent with severe cor pulmonale.  Continue supportive 

management of pulmonary status.








Subjective


Date of service: 05/27/20


Principal diagnosis: Ac hypoxemic and hypercapnic resp failure; AE-COPD; NSTEMI;

MILAN; HFrEF


Interval history: 





The patient is awake, comfortable, no acute distress on nasal oxygen.  On 

telemetry, there is persistent atrial flutter fibrillation, with an elevated 

ventricular response at 125.  The prescribed Cardizem has been on hold due to 

concerns about her low blood pressure.





Objective


                                   Vital Signs











  Temp Pulse Pulse Pulse Resp Resp BP


 


 05/27/20 12:00  98.0 F      


 


 05/27/20 11:00   125 H    25 H   120/82


 


 05/27/20 10:00   125 H    25 H   112/79


 


 05/27/20 09:17     126 H  27 H  


 


 05/27/20 09:00   126 H    27 H   121/78


 


 05/27/20 08:00  97.9 F  128 H  126 H   22  31 H  125/86


 


 05/27/20 07:00   127 H    26 H   140/92


 


 05/27/20 06:01   128 H    29 H   162/92


 


 05/27/20 05:46   125 H      122/95


 


 05/27/20 05:01   128 H    24   114/85


 


 05/27/20 04:30   120 H    33 H   113/86


 


 05/27/20 04:01   127 H    17   113/86


 


 05/27/20 04:00  96.0 F L  128 H   128 H  27 H  


 


 05/27/20 03:01   110 H    26 H   150/87


 


 05/27/20 02:00   127 H    24   127/91


 


 05/27/20 01:20   95 H    27 H   122/85


 


 05/27/20 01:00   126 H    28 H   122/85


 


 05/27/20 00:00  97.6 F  126 H   126 H  28 H   131/100


 


 05/26/20 23:55   114 H      145/85


 


 05/26/20 23:01   110 H    22   145/85


 


 05/26/20 22:01   114 H    16   139/85


 


 05/26/20 21:50   106 H    27 H   139/85


 


 05/26/20 21:30    119 H    29 H 


 


 05/26/20 21:00   125 H    28 H   112/83


 


 05/26/20 20:01   119 H    25 H   130/92


 


 05/26/20 20:00  98.3 F  114 H   114 H  29 H  


 


 05/26/20 19:00   124 H    30 H   113/78


 


 05/26/20 18:18   124 H    47 H   114/81


 


 05/26/20 18:00   102 H    32 H   114/81


 


 05/26/20 17:59   112 H      130/100


 


 05/26/20 17:00   108 H    23   130/100


 


 05/26/20 16:00   112 H   110 H  24   118/88


 


 05/26/20 15:00   102 H    17   96/64


 


 05/26/20 14:54       


 


 05/26/20 14:00   116 H    26 H   105/75


 


 05/26/20 13:00   103 H    25 H   105/75














  Pulse Ox


 


 05/27/20 12:00 


 


 05/27/20 11:00  92


 


 05/27/20 10:00  93


 


 05/27/20 09:17  93


 


 05/27/20 09:00  93


 


 05/27/20 08:00  94


 


 05/27/20 07:00  94


 


 05/27/20 06:01  93


 


 05/27/20 05:46 


 


 05/27/20 05:01  95


 


 05/27/20 04:30  95


 


 05/27/20 04:01  96


 


 05/27/20 04:00  96


 


 05/27/20 03:01  94


 


 05/27/20 02:00  94


 


 05/27/20 01:20  93


 


 05/27/20 01:00  94


 


 05/27/20 00:00  92


 


 05/26/20 23:55 


 


 05/26/20 23:01  92


 


 05/26/20 22:01  87


 


 05/26/20 21:50  92


 


 05/26/20 21:30  92


 


 05/26/20 21:00  88


 


 05/26/20 20:01  89


 


 05/26/20 20:00  90


 


 05/26/20 19:00  95


 


 05/26/20 18:18  95


 


 05/26/20 18:00  94 05/26/20 17:59 


 


 05/26/20 17:00  94 05/26/20 16:00  95


 


 05/26/20 15:00 


 


 05/26/20 14:54  95 05/26/20 14:00  93


 


 05/26/20 13:00  93














- Physical Examination


General: No Apparent Distress


HEENT: Positive: PERRL


Neck: Positive: neck supple


Cardiac: Positive: irregularly irregular


Lungs: Positive: Decreased Breath Sounds


Neuro: Positive: Grossly Intact


Abdomen: Positive: Soft


Skin: Positive: Clear


Extremities: Present: edema (trace)





- Labs and Meds


                                       CBC











  05/26/20 05/27/20 Range/Units





  15:05 05:09 


 


WBC  21.0 H  23.3 H  (4.5-11.0)  K/mm3


 


RBC  4.48  4.57  (3.65-5.03)  M/mm3


 


Hgb  14.6 H D  15.3 H  (10.1-14.3)  gm/dl


 


Hct  45.8 H D  48.1 H  (30.3-42.9)  %


 


Plt Count  38 L  31 L  (140-440)  K/mm3








                          Comprehensive Metabolic Panel











  05/27/20 Range/Units





  05:09 


 


Sodium  136 L  (137-145)  mmol/L


 


Potassium  5.5 H  (3.6-5.0)  mmol/L


 


Chloride  94.8 L  ()  mmol/L


 


Carbon Dioxide  26  (22-30)  mmol/L


 


BUN  130 H  (7-17)  mg/dL


 


Creatinine  2.3 H  (0.7-1.2)  mg/dL


 


Glucose  193 H  ()  mg/dL


 


Calcium  8.8  (8.4-10.2)  mg/dL














- Allied health notes


Allied health notes reviewed: nursing

## 2020-05-27 NOTE — PROGRESS NOTE
Assessment and Plan








Acute hypoxemic and hypercapnic respiratory failure


COPD


Severe pulmonary hypertension


Hyponatremia


Acute toxic-metabolic encephalaopthy


Tobacco use disorder/Nicotine dependence


Non-ST elevation MI 


Acute on chronic congestive heart failure-systolic, EF 20-25%


Acute kidney injury secondary to vasomotor nephropathy and diuresis


Bilateral lower extremity edema


Anemia of chronic disease 


Morbid obesity





- continue qhs BIPAP as tolerated with prn daytime use


- azotemia per nephrology


- watch for signs of bleeding


- continue care as below otherwise





- continue enteral nutrition as tolerated


- continue aspiration precautions


- continue supplemental oxygen as needed to keep O2 sat's > 92%


- continue bronchodilators (SAIDA & LABA) with pulmonary hygiene per RT


- continue systemic steroids with taper


- continue inhaled corticosteroids


- empiric CAP AB's therapy with Levaquin


- PT/OT as tolerated


- mobility protocols for pressure ulcer prophylaxis


- accuchecks with glycemic control per SSI for target BG < 140-180 mg/dl


- soft restraints as pulling at NGT 


- NSTEMI per cardiology team


- Avoid nephrotoxins, adjust all medications for GFR and CrCL 


- heparin stopped re: thrombocytopenia


- HIT assay ordered


- ACS work-up & optimizatio of CHF management per cardiology


- wean levophed for target MAP >/= 65 mmHg


- mobility protocols for pressure ulcer prophylaxis


- avoid benzodiazepines acute re: delirium issues


- prn analgesia per CPOT score


- continue GI & VTE prophylaxis


- Flu & pneumovax addressed per protocol


- continue other care per attending / other consultants





.. re-evaluate in am & prn





CONDITION- CRITICAL


PROGNOSIS- GUARDED


CODE STATUS- FULL CODE





Life threatening condition: Severe pulmonary HTN, acute and chronic  hypoxic 

respiratory failure on high flow oxygen, acute on chronic systolic heart 

failure, hyponatremia, symptomatic hypoglycemia


Morbidity/Mortality: High


complexity of medical decision making: High





The high probability of a clinically significant, sudden or life-threatening 

deterioration of the [respiratory, cardiovascular,neurologic, renal] system(s) 

required my full and direct attention, intervention and personal management. The

aggregate critical care time was [35] minutes without overlap. Time includes 

spent on;





[x] Data Review and interpretation 





[x] Patient assessment and monitoring of vital signs 





[x] Documentation 





[x] Medication orders and management








Subjective


Date of service: 05/27/20


Principal diagnosis: Ac hypoxemic and hypercapnic resp failure; AE-COPD; NSTEMI;

 MILAN; HFrEF


Interval history: 





Patient is seen today for: Acute hypoxemic and hypercapnic respiratory failure; 

AE-COPD; Severe Pulm HTN; Acute toxic-metabolic encephalaopthy; NSTEMI; MILAN; 

HFrEF





Seen and examined at bedside; 24hour events reviewed; nursing and respiratory 

care staff consulted; no adverse overnight events reported to me; resting 

peacefully in bed; remains on HFNC; secretions better; tolerating BIPAP qhs; AMS

 is a little better





Objective


                               Vital Signs - 12hr











  05/27/20 05/27/20 05/27/20





  03:01 04:00 04:01


 


Temperature  96.0 F L 


 


Pulse Rate 110 H 128 H 127 H


 


Pulse Rate [   





Bilateral   





Throughout]   


 


Pulse Rate [  128 H 





From Monitor]   


 


Respiratory 26 H 27 H 17





Rate   


 


Respiratory   





Rate [Bilateral   





Throughout]   


 


Blood Pressure 150/87  113/86


 


O2 Sat by Pulse 94 96 96





Oximetry   














  05/27/20 05/27/20 05/27/20





  04:30 05:01 05:46


 


Temperature   


 


Pulse Rate 120 H 128 H 125 H


 


Pulse Rate [   





Bilateral   





Throughout]   


 


Pulse Rate [   





From Monitor]   


 


Respiratory 33 H 24 





Rate   


 


Respiratory   





Rate [Bilateral   





Throughout]   


 


Blood Pressure 113/86 114/85 122/95


 


O2 Sat by Pulse 95 95 





Oximetry   














  05/27/20 05/27/20 05/27/20





  06:01 07:00 08:00


 


Temperature   97.9 F


 


Pulse Rate 128 H 127 H 128 H


 


Pulse Rate [   126 H





Bilateral   





Throughout]   


 


Pulse Rate [   





From Monitor]   


 


Respiratory 29 H 26 H 22





Rate   


 


Respiratory   31 H





Rate [Bilateral   





Throughout]   


 


Blood Pressure 162/92 140/92 125/86


 


O2 Sat by Pulse 93 94 94





Oximetry   














  05/27/20 05/27/20 05/27/20





  09:00 09:17 10:00


 


Temperature   


 


Pulse Rate 126 H  125 H


 


Pulse Rate [   





Bilateral   





Throughout]   


 


Pulse Rate [  126 H 





From Monitor]   


 


Respiratory 27 H 27 H 25 H





Rate   


 


Respiratory   





Rate [Bilateral   





Throughout]   


 


Blood Pressure 121/78  112/79


 


O2 Sat by Pulse 93 93 93





Oximetry   














  05/27/20 05/27/20 05/27/20





  11:00 12:00 12:43


 


Temperature  98.0 F 


 


Pulse Rate 125 H 125 H 120 H


 


Pulse Rate [   





Bilateral   





Throughout]   


 


Pulse Rate [  125 H 





From Monitor]   


 


Respiratory 25 H 32 H 





Rate   


 


Respiratory   





Rate [Bilateral   





Throughout]   


 


Blood Pressure 120/82 128/91 128/91


 


O2 Sat by Pulse 92 91 





Oximetry   














  05/27/20 05/27/20 05/27/20





  13:00 13:02 14:01


 


Temperature   


 


Pulse Rate 127 H 127 H 126 H


 


Pulse Rate [   





Bilateral   





Throughout]   


 


Pulse Rate [   





From Monitor]   


 


Respiratory 25 H  36 H





Rate   


 


Respiratory   





Rate [Bilateral   





Throughout]   


 


Blood Pressure 122/98 122/98 147/93


 


O2 Sat by Pulse 92  90





Oximetry   











Constitutional: appears uncomfortable, other (elderly obese female with mildly 

increased respiratory effort at rest)


Eyes: non-icteric


ENT: oropharynx dry


Neck: supple, no lymphadenopathy


Effort: mildly labored


Ascultation: Bilateral: diminished breath sounds (poor AE bilaterally), rhonchi,

other (prolonged expiratory phase)


Percussion: Bilateral: not dull


Cardiovascular: regular rate and rhythm, other (irrreggular, S1,S2)


Gastrointestinal: normoactive bowel sounds, soft, non-tender, non-distended


Integumentary: rash, other (lower extemity edema +)


Extremities: no cyanosis, pink and warm, pulses normal, no ischemia or 

petechiae, edema


Neurologic: non-focal exam (moves all extemities), pupils equal and round, CN 

II-XII normal, other (lethargic)


Psychiatric: other (unable to assess secondary to mental status)


CBC and BMP: 


                                 05/27/20 05:09





                                 05/27/20 05:09


ABG, PT/INR, D-dimer: 


ABG











ABG pH  7.317 pH Units (7.350-7.450)  L  05/23/20  03:30    


 


ABG pCO2  55.1 mm Hg  05/23/20  03:30    


 


ABG pO2  73.4 mm Hg (80.0-90.0)  L  05/23/20  03:30    


 


ABG O2 Saturation  94.3 % (95.0-99.0)  L  05/23/20  03:30    





PT/INR, D-dimer











PT  15.3 Sec. (12.2-14.9)  H  05/17/20  03:25    


 


INR  1.20  (0.87-1.13)  H  05/17/20  03:25    








Abnormal lab findings: 


                                  Abnormal Labs











  05/16/20 05/16/20 05/17/20





  22:13 22:13 00:00


 


WBC   


 


Hgb   


 


Hct  43.2 H   45.8 H


 


MCV  107 H   106 H


 


MCH  34 H   33 H


 


RDW  20.2 H   19.7 H


 


Plt Count   


 


Lymph % (Auto)   


 


Lymph #   


 


Seg Neutrophils %  77.3 H   78.8 H


 


Seg Neuts % (Manual)   


 


Lymphocytes % (Manual)   


 


Nucleated RBC %   


 


Seg Neutrophils #   


 


Seg Neutrophils # Man   


 


Lymphocytes # (Manual)   


 


PT   


 


INR   


 


Heparin Anti-Xa Level   


 


ABG pH   


 


ABG pO2   


 


ABG HCO3   


 


ABG O2 Saturation   


 


Oxyhemoglobin   


 


Sodium   130 L 


 


Potassium   


 


Chloride   85.0 L 


 


Carbon Dioxide   35 H 


 


BUN   


 


Creatinine   


 


Glucose   112 H 


 


POC Glucose   


 


Calcium   


 


Phosphorus   


 


Total Bilirubin   


 


AST   


 


ALT   5 L 


 


Troponin T   


 


NT-Pro-B Natriuret Pep   3719 H 


 


Total Protein   


 


Albumin   3.4 L 


 


LDL Cholesterol Direct   


 


Urine Creatinine   


 


Urine Total Protein   














  05/17/20 05/17/20 05/17/20





  00:00 00:12 03:25


 


WBC   


 


Hgb   


 


Hct   


 


MCV   


 


MCH   


 


RDW   


 


Plt Count   


 


Lymph % (Auto)   


 


Lymph #   


 


Seg Neutrophils %   


 


Seg Neuts % (Manual)   


 


Lymphocytes % (Manual)   


 


Nucleated RBC %   


 


Seg Neutrophils #   


 


Seg Neutrophils # Man   


 


Lymphocytes # (Manual)   


 


PT   


 


INR   


 


Heparin Anti-Xa Level   


 


ABG pH   


 


ABG pO2   


 


ABG HCO3   


 


ABG O2 Saturation   


 


Oxyhemoglobin   


 


Sodium  131 L  


 


Potassium   


 


Chloride  85.3 L  


 


Carbon Dioxide  35 H  


 


BUN   


 


Creatinine   


 


Glucose  108 H  


 


POC Glucose   


 


Calcium   


 


Phosphorus   


 


Total Bilirubin   


 


AST   


 


ALT   


 


Troponin T   0.051 H D  0.113 H* D


 


NT-Pro-B Natriuret Pep   


 


Total Protein   


 


Albumin   


 


LDL Cholesterol Direct   33 L 


 


Urine Creatinine   


 


Urine Total Protein   














  05/17/20 05/17/20 05/17/20





  03:25 03:25 03:25


 


WBC   


 


Hgb   


 


Hct   


 


MCV  107 H  


 


MCH  34 H  


 


RDW  20.1 H  


 


Plt Count   


 


Lymph % (Auto)   


 


Lymph #   


 


Seg Neutrophils %  77.0 H  


 


Seg Neuts % (Manual)   


 


Lymphocytes % (Manual)   


 


Nucleated RBC %   


 


Seg Neutrophils #   


 


Seg Neutrophils # Man   


 


Lymphocytes # (Manual)   


 


PT   15.3 H 


 


INR   1.20 H 


 


Heparin Anti-Xa Level   


 


ABG pH   


 


ABG pO2   


 


ABG HCO3   


 


ABG O2 Saturation   


 


Oxyhemoglobin   


 


Sodium    133 L


 


Potassium   


 


Chloride    86.1 L


 


Carbon Dioxide    36 H


 


BUN   


 


Creatinine   


 


Glucose    116 H


 


POC Glucose   


 


Calcium   


 


Phosphorus   


 


Total Bilirubin   


 


AST   


 


ALT   


 


Troponin T   


 


NT-Pro-B Natriuret Pep   


 


Total Protein   


 


Albumin   


 


LDL Cholesterol Direct   


 


Urine Creatinine   


 


Urine Total Protein   














  05/17/20 05/17/20 05/18/20





  05:29 13:51 04:45


 


WBC   


 


Hgb   


 


Hct   


 


MCV    107 H


 


MCH    34 H


 


RDW    20.1 H


 


Plt Count    136 L


 


Lymph % (Auto)   


 


Lymph #   


 


Seg Neutrophils %   


 


Seg Neuts % (Manual)   


 


Lymphocytes % (Manual)   


 


Nucleated RBC %   


 


Seg Neutrophils #   


 


Seg Neutrophils # Man   


 


Lymphocytes # (Manual)   


 


PT   


 


INR   


 


Heparin Anti-Xa Level   0.14 L 


 


ABG pH   


 


ABG pO2   


 


ABG HCO3   


 


ABG O2 Saturation   


 


Oxyhemoglobin   


 


Sodium   


 


Potassium   


 


Chloride   


 


Carbon Dioxide   


 


BUN   


 


Creatinine   


 


Glucose   


 


POC Glucose   


 


Calcium   


 


Phosphorus   


 


Total Bilirubin   


 


AST   


 


ALT   


 


Troponin T  0.126 H*  


 


NT-Pro-B Natriuret Pep   


 


Total Protein   


 


Albumin   


 


LDL Cholesterol Direct   


 


Urine Creatinine   


 


Urine Total Protein   














  05/18/20 05/18/20 05/18/20





  04:45 16:23 16:23


 


WBC   


 


Hgb   


 


Hct   


 


MCV   


 


MCH   


 


RDW   


 


Plt Count   


 


Lymph % (Auto)   


 


Lymph #   


 


Seg Neutrophils %   


 


Seg Neuts % (Manual)   


 


Lymphocytes % (Manual)   


 


Nucleated RBC %   


 


Seg Neutrophils #   


 


Seg Neutrophils # Man   


 


Lymphocytes # (Manual)   


 


PT   


 


INR   


 


Heparin Anti-Xa Level   0.27 L 


 


ABG pH   


 


ABG pO2   


 


ABG HCO3   


 


ABG O2 Saturation   


 


Oxyhemoglobin   


 


Sodium  127 L   123 L


 


Potassium  5.1 H D  


 


Chloride  83.9 L  


 


Carbon Dioxide  32 H  


 


BUN   


 


Creatinine  1.3 H D  


 


Glucose  116 H  


 


POC Glucose   


 


Calcium  8.3 L  


 


Phosphorus   


 


Total Bilirubin   


 


AST   


 


ALT   


 


Troponin T   


 


NT-Pro-B Natriuret Pep   


 


Total Protein   


 


Albumin   


 


LDL Cholesterol Direct   


 


Urine Creatinine   


 


Urine Total Protein   














  05/18/20 05/19/20 05/19/20





  18:42 09:32 09:32


 


WBC   11.4 H 


 


Hgb   


 


Hct   


 


MCV   106 H 


 


MCH   33 H 


 


RDW   19.5 H 


 


Plt Count   131 L 


 


Lymph % (Auto)   


 


Lymph #   


 


Seg Neutrophils %   


 


Seg Neuts % (Manual)   


 


Lymphocytes % (Manual)   


 


Nucleated RBC %   


 


Seg Neutrophils #   


 


Seg Neutrophils # Man   


 


Lymphocytes # (Manual)   


 


PT   


 


INR   


 


Heparin Anti-Xa Level   


 


ABG pH   


 


ABG pO2   


 


ABG HCO3   


 


ABG O2 Saturation   


 


Oxyhemoglobin   


 


Sodium    128 L


 


Potassium   


 


Chloride    82.4 L


 


Carbon Dioxide    31 H


 


BUN    25 H


 


Creatinine    1.9 H


 


Glucose    61 L


 


POC Glucose  135 H  


 


Calcium    8.2 L


 


Phosphorus   


 


Total Bilirubin   


 


AST   


 


ALT   


 


Troponin T   


 


NT-Pro-B Natriuret Pep   


 


Total Protein   


 


Albumin   


 


LDL Cholesterol Direct   


 


Urine Creatinine   


 


Urine Total Protein   














  05/19/20 05/19/20 05/19/20





  13:37 19:24 19:33


 


WBC   


 


Hgb   


 


Hct   


 


MCV   


 


MCH   


 


RDW   


 


Plt Count   


 


Lymph % (Auto)   


 


Lymph #   


 


Seg Neutrophils %   


 


Seg Neuts % (Manual)   


 


Lymphocytes % (Manual)   


 


Nucleated RBC %   


 


Seg Neutrophils #   


 


Seg Neutrophils # Man   


 


Lymphocytes # (Manual)   


 


PT   


 


INR   


 


Heparin Anti-Xa Level  < 0.10 L  


 


ABG pH   


 


ABG pO2   


 


ABG HCO3   


 


ABG O2 Saturation   


 


Oxyhemoglobin   


 


Sodium   


 


Potassium   


 


Chloride   


 


Carbon Dioxide   


 


BUN   


 


Creatinine   


 


Glucose   


 


POC Glucose   < 40 L  > 500 H


 


Calcium   


 


Phosphorus   


 


Total Bilirubin   


 


AST   


 


ALT   


 


Troponin T   


 


NT-Pro-B Natriuret Pep   


 


Total Protein   


 


Albumin   


 


LDL Cholesterol Direct   


 


Urine Creatinine   


 


Urine Total Protein   














  05/19/20 05/19/20 05/19/20





  20:01 20:10 21:03


 


WBC   


 


Hgb   


 


Hct   


 


MCV   


 


MCH   


 


RDW   


 


Plt Count   


 


Lymph % (Auto)   


 


Lymph #   


 


Seg Neutrophils %   


 


Seg Neuts % (Manual)   


 


Lymphocytes % (Manual)   


 


Nucleated RBC %   


 


Seg Neutrophils #   


 


Seg Neutrophils # Man   


 


Lymphocytes # (Manual)   


 


PT   


 


INR   


 


Heparin Anti-Xa Level  < 0.10 L  


 


ABG pH   


 


ABG pO2   


 


ABG HCO3   


 


ABG O2 Saturation   


 


Oxyhemoglobin   


 


Sodium   124 L 


 


Potassium   5.5 H D 


 


Chloride   82.4 L 


 


Carbon Dioxide   


 


BUN   31 H 


 


Creatinine   2.1 H 


 


Glucose   212 H 


 


POC Glucose    202 H


 


Calcium   8.0 L 


 


Phosphorus   


 


Total Bilirubin   


 


AST   


 


ALT   


 


Troponin T   


 


NT-Pro-B Natriuret Pep   


 


Total Protein   


 


Albumin   


 


LDL Cholesterol Direct   


 


Urine Creatinine   


 


Urine Total Protein   














  05/19/20 05/19/20 05/19/20





  21:53 23:18 Unknown


 


WBC   


 


Hgb   


 


Hct   


 


MCV   


 


MCH   


 


RDW   


 


Plt Count   


 


Lymph % (Auto)   


 


Lymph #   


 


Seg Neutrophils %   


 


Seg Neuts % (Manual)   


 


Lymphocytes % (Manual)   


 


Nucleated RBC %   


 


Seg Neutrophils #   


 


Seg Neutrophils # Man   


 


Lymphocytes # (Manual)   


 


PT   


 


INR   


 


Heparin Anti-Xa Level   


 


ABG pH   


 


ABG pO2   


 


ABG HCO3   


 


ABG O2 Saturation   


 


Oxyhemoglobin   


 


Sodium   


 


Potassium   


 


Chloride   


 


Carbon Dioxide   


 


BUN   


 


Creatinine   


 


Glucose   


 


POC Glucose  177 H  203 H 


 


Calcium   


 


Phosphorus   


 


Total Bilirubin   


 


AST   


 


ALT   


 


Troponin T   


 


NT-Pro-B Natriuret Pep   


 


Total Protein   


 


Albumin   


 


LDL Cholesterol Direct   


 


Urine Creatinine    216.1 H


 


Urine Total Protein    62 H














  05/20/20 05/20/20 05/20/20





  00:15 02:20 04:34


 


WBC   


 


Hgb   


 


Hct   


 


MCV   


 


MCH   


 


RDW   


 


Plt Count   


 


Lymph % (Auto)   


 


Lymph #   


 


Seg Neutrophils %   


 


Seg Neuts % (Manual)   


 


Lymphocytes % (Manual)   


 


Nucleated RBC %   


 


Seg Neutrophils #   


 


Seg Neutrophils # Man   


 


Lymphocytes # (Manual)   


 


PT   


 


INR   


 


Heparin Anti-Xa Level   


 


ABG pH   


 


ABG pO2   


 


ABG HCO3   


 


ABG O2 Saturation   


 


Oxyhemoglobin   


 


Sodium    129 L


 


Potassium   


 


Chloride    83.7 L


 


Carbon Dioxide   


 


BUN    36 H


 


Creatinine    2.8 H


 


Glucose    129 H


 


POC Glucose  156 H  149 H 


 


Calcium    7.5 L


 


Phosphorus   


 


Total Bilirubin    2.30 H


 


AST    3636 H


 


ALT    1241 H


 


Troponin T   


 


NT-Pro-B Natriuret Pep   


 


Total Protein   


 


Albumin    3.2 L


 


LDL Cholesterol Direct   


 


Urine Creatinine   


 


Urine Total Protein   














  05/20/20 05/20/20 05/20/20





  04:34 05:19 12:13


 


WBC   


 


Hgb   


 


Hct   


 


MCV   


 


MCH   


 


RDW   


 


Plt Count   


 


Lymph % (Auto)   


 


Lymph #   


 


Seg Neutrophils %   


 


Seg Neuts % (Manual)   


 


Lymphocytes % (Manual)   


 


Nucleated RBC %   


 


Seg Neutrophils #   


 


Seg Neutrophils # Man   


 


Lymphocytes # (Manual)   


 


PT   


 


INR   


 


Heparin Anti-Xa Level  0.10 L  


 


ABG pH   


 


ABG pO2   


 


ABG HCO3   


 


ABG O2 Saturation   


 


Oxyhemoglobin   


 


Sodium   


 


Potassium   


 


Chloride   


 


Carbon Dioxide   


 


BUN   


 


Creatinine   


 


Glucose   


 


POC Glucose   115 H  69 L


 


Calcium   


 


Phosphorus   


 


Total Bilirubin   


 


AST   


 


ALT   


 


Troponin T   


 


NT-Pro-B Natriuret Pep   


 


Total Protein   


 


Albumin   


 


LDL Cholesterol Direct   


 


Urine Creatinine   


 


Urine Total Protein   














  05/20/20 05/20/20 05/20/20





  15:15 17:37 18:44


 


WBC   


 


Hgb   


 


Hct   


 


MCV   


 


MCH   


 


RDW   


 


Plt Count   


 


Lymph % (Auto)   


 


Lymph #   


 


Seg Neutrophils %   


 


Seg Neuts % (Manual)   


 


Lymphocytes % (Manual)   


 


Nucleated RBC %   


 


Seg Neutrophils #   


 


Seg Neutrophils # Man   


 


Lymphocytes # (Manual)   


 


PT   


 


INR   


 


Heparin Anti-Xa Level   


 


ABG pH  7.180 L*  


 


ABG pO2  70.1 L  


 


ABG HCO3  31.5 H  


 


ABG O2 Saturation  89.6 L  


 


Oxyhemoglobin  87.7 L  


 


Sodium   


 


Potassium   


 


Chloride   


 


Carbon Dioxide   


 


BUN   


 


Creatinine   


 


Glucose   


 


POC Glucose   68 L  160 H


 


Calcium   


 


Phosphorus   


 


Total Bilirubin   


 


AST   


 


ALT   


 


Troponin T   


 


NT-Pro-B Natriuret Pep   


 


Total Protein   


 


Albumin   


 


LDL Cholesterol Direct   


 


Urine Creatinine   


 


Urine Total Protein   














  05/20/20 05/20/20 05/20/20





  20:16 21:00 Unknown


 


WBC   


 


Hgb   


 


Hct   


 


MCV   


 


MCH   


 


RDW   


 


Plt Count   


 


Lymph % (Auto)   


 


Lymph #   


 


Seg Neutrophils %   


 


Seg Neuts % (Manual)   


 


Lymphocytes % (Manual)   


 


Nucleated RBC %   


 


Seg Neutrophils #   


 


Seg Neutrophils # Man   


 


Lymphocytes # (Manual)   


 


PT   


 


INR   


 


Heparin Anti-Xa Level   0.20 L  0.12 L


 


ABG pH  7.184 L*  


 


ABG pO2  77.9 L  


 


ABG HCO3  30.5 H  


 


ABG O2 Saturation  92.8 L  


 


Oxyhemoglobin  90.7 L  


 


Sodium   


 


Potassium   


 


Chloride   


 


Carbon Dioxide   


 


BUN   


 


Creatinine   


 


Glucose   


 


POC Glucose   


 


Calcium   


 


Phosphorus   


 


Total Bilirubin   


 


AST   


 


ALT   


 


Troponin T   


 


NT-Pro-B Natriuret Pep   


 


Total Protein   


 


Albumin   


 


LDL Cholesterol Direct   


 


Urine Creatinine   


 


Urine Total Protein   














  05/21/20 05/21/20 05/21/20





  00:06 00:08 05:35


 


WBC    15.0 H


 


Hgb   


 


Hct   


 


MCV    107 H


 


MCH    34 H


 


RDW    19.5 H


 


Plt Count    56 L


 


Lymph % (Auto)   


 


Lymph #   


 


Seg Neutrophils %   


 


Seg Neuts % (Manual)    92.0 H


 


Lymphocytes % (Manual)    5.0 L


 


Nucleated RBC %    4.0 H


 


Seg Neutrophils #   


 


Seg Neutrophils # Man    0.0 L


 


Lymphocytes # (Manual)    0.0 L


 


PT   


 


INR   


 


Heparin Anti-Xa Level   


 


ABG pH   7.230 L 


 


ABG pO2   79.8 L 


 


ABG HCO3   29.2 H 


 


ABG O2 Saturation   94.3 L 


 


Oxyhemoglobin   92.3 L 


 


Sodium   


 


Potassium   


 


Chloride   


 


Carbon Dioxide   


 


BUN   


 


Creatinine   


 


Glucose   


 


POC Glucose  185 H  


 


Calcium   


 


Phosphorus   


 


Total Bilirubin   


 


AST   


 


ALT   


 


Troponin T   


 


NT-Pro-B Natriuret Pep   


 


Total Protein   


 


Albumin   


 


LDL Cholesterol Direct   


 


Urine Creatinine   


 


Urine Total Protein   














  05/21/20 05/21/20 05/21/20





  05:35 06:07 12:33


 


WBC   


 


Hgb   


 


Hct   


 


MCV   


 


MCH   


 


RDW   


 


Plt Count   


 


Lymph % (Auto)   


 


Lymph #   


 


Seg Neutrophils %   


 


Seg Neuts % (Manual)   


 


Lymphocytes % (Manual)   


 


Nucleated RBC %   


 


Seg Neutrophils #   


 


Seg Neutrophils # Man   


 


Lymphocytes # (Manual)   


 


PT   


 


INR   


 


Heparin Anti-Xa Level   


 


ABG pH   


 


ABG pO2   


 


ABG HCO3   


 


ABG O2 Saturation   


 


Oxyhemoglobin   


 


Sodium  130 L  


 


Potassium   


 


Chloride  85.2 L  


 


Carbon Dioxide   


 


BUN  49 H  


 


Creatinine  3.4 H  


 


Glucose  157 H  


 


POC Glucose   158 H  170 H


 


Calcium  6.7 L  


 


Phosphorus  7.00 H  


 


Total Bilirubin  2.10 H  


 


AST  2625 H  


 


ALT  1547 H  


 


Troponin T   


 


NT-Pro-B Natriuret Pep   


 


Total Protein  6.2 L  


 


Albumin  3.3 L  


 


LDL Cholesterol Direct   


 


Urine Creatinine   


 


Urine Total Protein   














  05/21/20 05/22/20 05/22/20





  18:26 00:26 04:30


 


WBC    12.3 H


 


Hgb   


 


Hct    43.6 H


 


MCV    104 H


 


MCH    33 H


 


RDW    20.4 H


 


Plt Count    44 L


 


Lymph % (Auto)   


 


Lymph #   


 


Seg Neutrophils %   


 


Seg Neuts % (Manual)    89.0 H


 


Lymphocytes % (Manual)    7.0 L


 


Nucleated RBC %   


 


Seg Neutrophils #   


 


Seg Neutrophils # Man    10.9 H


 


Lymphocytes # (Manual)    0.9 L


 


PT   


 


INR   


 


Heparin Anti-Xa Level   


 


ABG pH   


 


ABG pO2   


 


ABG HCO3   


 


ABG O2 Saturation   


 


Oxyhemoglobin   


 


Sodium   


 


Potassium   


 


Chloride   


 


Carbon Dioxide   


 


BUN   


 


Creatinine   


 


Glucose   


 


POC Glucose  172 H  171 H 


 


Calcium   


 


Phosphorus   


 


Total Bilirubin   


 


AST   


 


ALT   


 


Troponin T   


 


NT-Pro-B Natriuret Pep   


 


Total Protein   


 


Albumin   


 


LDL Cholesterol Direct   


 


Urine Creatinine   


 


Urine Total Protein   














  05/22/20 05/22/20 05/22/20





  04:30 06:16 15:45


 


WBC   


 


Hgb   


 


Hct   


 


MCV   


 


MCH   


 


RDW   


 


Plt Count   


 


Lymph % (Auto)   


 


Lymph #   


 


Seg Neutrophils %   


 


Seg Neuts % (Manual)   


 


Lymphocytes % (Manual)   


 


Nucleated RBC %   


 


Seg Neutrophils #   


 


Seg Neutrophils # Man   


 


Lymphocytes # (Manual)   


 


PT   


 


INR   


 


Heparin Anti-Xa Level   


 


ABG pH    7.310 L


 


ABG pO2    73.7 L


 


ABG HCO3    27.2 H


 


ABG O2 Saturation    94.2 L


 


Oxyhemoglobin    92.1 L


 


Sodium  132 L  


 


Potassium   


 


Chloride  88.4 L  


 


Carbon Dioxide   


 


BUN  61 H  


 


Creatinine  2.9 H  


 


Glucose  168 H  


 


POC Glucose   179 H 


 


Calcium  6.9 L  


 


Phosphorus   


 


Total Bilirubin  1.80 H  


 


AST  967 H  


 


ALT  1094 H  


 


Troponin T   


 


NT-Pro-B Natriuret Pep   


 


Total Protein  6.1 L  


 


Albumin  2.9 L  


 


LDL Cholesterol Direct   


 


Urine Creatinine   


 


Urine Total Protein   














  05/23/20 05/23/20 05/23/20





  00:26 03:30 05:15


 


WBC   


 


Hgb   


 


Hct    43.9 H


 


MCV    105 H


 


MCH    34 H


 


RDW    20.2 H


 


Plt Count    30 L


 


Lymph % (Auto)    5.2 L


 


Lymph #    0.5 L


 


Seg Neutrophils %    89.7 H


 


Seg Neuts % (Manual)   


 


Lymphocytes % (Manual)   


 


Nucleated RBC %   


 


Seg Neutrophils #    9.4 H


 


Seg Neutrophils # Man   


 


Lymphocytes # (Manual)   


 


PT   


 


INR   


 


Heparin Anti-Xa Level   


 


ABG pH   7.317 L 


 


ABG pO2   73.4 L 


 


ABG HCO3   27.6 H 


 


ABG O2 Saturation   94.3 L 


 


Oxyhemoglobin   92.3 L 


 


Sodium   


 


Potassium   


 


Chloride   


 


Carbon Dioxide   


 


BUN   


 


Creatinine   


 


Glucose   


 


POC Glucose  204 H  


 


Calcium   


 


Phosphorus   


 


Total Bilirubin   


 


AST   


 


ALT   


 


Troponin T   


 


NT-Pro-B Natriuret Pep   


 


Total Protein   


 


Albumin   


 


LDL Cholesterol Direct   


 


Urine Creatinine   


 


Urine Total Protein   














  05/23/20 05/24/20 05/24/20





  05:15 03:15 11:51


 


WBC   


 


Hgb   


 


Hct   


 


MCV   


 


MCH   


 


RDW   


 


Plt Count   


 


Lymph % (Auto)   


 


Lymph #   


 


Seg Neutrophils %   


 


Seg Neuts % (Manual)   


 


Lymphocytes % (Manual)   


 


Nucleated RBC %   


 


Seg Neutrophils #   


 


Seg Neutrophils # Man   


 


Lymphocytes # (Manual)   


 


PT   


 


INR   


 


Heparin Anti-Xa Level   


 


ABG pH   


 


ABG pO2   


 


ABG HCO3   


 


ABG O2 Saturation   


 


Oxyhemoglobin   


 


Sodium  136 L  135 L 


 


Potassium   


 


Chloride  93.1 L  93.5 L 


 


Carbon Dioxide   


 


BUN  65 H  73 H 


 


Creatinine  2.3 H  2.2 H 


 


Glucose  148 H  128 H 


 


POC Glucose    169 H


 


Calcium  7.5 L  8.2 L 


 


Phosphorus   


 


Total Bilirubin  1.80 H  1.60 H 


 


AST  322 H  159 H 


 


ALT  723 H  514 H 


 


Troponin T   


 


NT-Pro-B Natriuret Pep   


 


Total Protein  6.0 L  5.7 L 


 


Albumin  2.9 L  2.7 L 


 


LDL Cholesterol Direct   


 


Urine Creatinine   


 


Urine Total Protein   














  05/24/20 05/25/20 05/25/20





  17:41 04:23 04:23


 


WBC   


 


Hgb   


 


Hct   


 


MCV   


 


MCH   


 


RDW   


 


Plt Count   25 L 


 


Lymph % (Auto)   


 


Lymph #   


 


Seg Neutrophils %   


 


Seg Neuts % (Manual)   


 


Lymphocytes % (Manual)   


 


Nucleated RBC %   


 


Seg Neutrophils #   


 


Seg Neutrophils # Man   


 


Lymphocytes # (Manual)   


 


PT   


 


INR   


 


Heparin Anti-Xa Level   


 


ABG pH   


 


ABG pO2   


 


ABG HCO3   


 


ABG O2 Saturation   


 


Oxyhemoglobin   


 


Sodium    135 L


 


Potassium   


 


Chloride    94.7 L


 


Carbon Dioxide   


 


BUN    91 H


 


Creatinine    2.3 H


 


Glucose    165 H


 


POC Glucose  158 H  


 


Calcium   


 


Phosphorus   


 


Total Bilirubin   


 


AST    72 H


 


ALT    362 H


 


Troponin T   


 


NT-Pro-B Natriuret Pep   


 


Total Protein    5.9 L


 


Albumin    2.9 L


 


LDL Cholesterol Direct   


 


Urine Creatinine   


 


Urine Total Protein   














  05/25/20 05/25/20 05/26/20





  11:32 16:58 00:06


 


WBC   


 


Hgb   


 


Hct   


 


MCV   


 


MCH   


 


RDW   


 


Plt Count   


 


Lymph % (Auto)   


 


Lymph #   


 


Seg Neutrophils %   


 


Seg Neuts % (Manual)   


 


Lymphocytes % (Manual)   


 


Nucleated RBC %   


 


Seg Neutrophils #   


 


Seg Neutrophils # Man   


 


Lymphocytes # (Manual)   


 


PT   


 


INR   


 


Heparin Anti-Xa Level   


 


ABG pH   


 


ABG pO2   


 


ABG HCO3   


 


ABG O2 Saturation   


 


Oxyhemoglobin   


 


Sodium   


 


Potassium   


 


Chloride   


 


Carbon Dioxide   


 


BUN   


 


Creatinine   


 


Glucose   


 


POC Glucose  219 H  187 H  175 H


 


Calcium   


 


Phosphorus   


 


Total Bilirubin   


 


AST   


 


ALT   


 


Troponin T   


 


NT-Pro-B Natriuret Pep   


 


Total Protein   


 


Albumin   


 


LDL Cholesterol Direct   


 


Urine Creatinine   


 


Urine Total Protein   














  05/26/20 05/26/20 05/26/20





  05:59 10:04 12:39


 


WBC   


 


Hgb   


 


Hct   


 


MCV   


 


MCH   


 


RDW   


 


Plt Count   


 


Lymph % (Auto)   


 


Lymph #   


 


Seg Neutrophils %   


 


Seg Neuts % (Manual)   


 


Lymphocytes % (Manual)   


 


Nucleated RBC %   


 


Seg Neutrophils #   


 


Seg Neutrophils # Man   


 


Lymphocytes # (Manual)   


 


PT   


 


INR   


 


Heparin Anti-Xa Level   


 


ABG pH   


 


ABG pO2   


 


ABG HCO3   


 


ABG O2 Saturation   


 


Oxyhemoglobin   


 


Sodium   


 


Potassium   


 


Chloride   97.2 L 


 


Carbon Dioxide   


 


BUN   110 H 


 


Creatinine   2.4 H 


 


Glucose   205 H 


 


POC Glucose  194 H   198 H


 


Calcium   


 


Phosphorus   


 


Total Bilirubin   


 


AST   


 


ALT   228 H 


 


Troponin T   


 


NT-Pro-B Natriuret Pep   


 


Total Protein   5.3 L 


 


Albumin   2.8 L 


 


LDL Cholesterol Direct   


 


Urine Creatinine   


 


Urine Total Protein   














  05/26/20 05/26/20 05/27/20





  15:05 17:15 00:32


 


WBC  21.0 H  


 


Hgb  14.6 H D  


 


Hct  45.8 H D  


 


MCV  102 H  


 


MCH  33 H  


 


RDW  21.0 H  


 


Plt Count  38 L  


 


Lymph % (Auto)   


 


Lymph #   


 


Seg Neutrophils %   


 


Seg Neuts % (Manual)  94.0 H  


 


Lymphocytes % (Manual)  2.0 L  


 


Nucleated RBC %   


 


Seg Neutrophils #   


 


Seg Neutrophils # Man  19.7 H  


 


Lymphocytes # (Manual)  0.4 L  


 


PT   


 


INR   


 


Heparin Anti-Xa Level   


 


ABG pH   


 


ABG pO2   


 


ABG HCO3   


 


ABG O2 Saturation   


 


Oxyhemoglobin   


 


Sodium   


 


Potassium   


 


Chloride   


 


Carbon Dioxide   


 


BUN   


 


Creatinine   


 


Glucose   


 


POC Glucose   157 H  163 H


 


Calcium   


 


Phosphorus   


 


Total Bilirubin   


 


AST   


 


ALT   


 


Troponin T   


 


NT-Pro-B Natriuret Pep   


 


Total Protein   


 


Albumin   


 


LDL Cholesterol Direct   


 


Urine Creatinine   


 


Urine Total Protein   














  05/27/20 05/27/20 05/27/20





  05:09 05:09 06:00


 


WBC   23.3 H 


 


Hgb   15.3 H 


 


Hct   48.1 H 


 


MCV   105 H 


 


MCH   33 H 


 


RDW   20.8 H 


 


Plt Count   31 L 


 


Lymph % (Auto)   


 


Lymph #   


 


Seg Neutrophils %   


 


Seg Neuts % (Manual)   97.0 H 


 


Lymphocytes % (Manual)   1.0 L 


 


Nucleated RBC %   


 


Seg Neutrophils #   


 


Seg Neutrophils # Man   22.6 H 


 


Lymphocytes # (Manual)   0.2 L 


 


PT   


 


INR   


 


Heparin Anti-Xa Level   


 


ABG pH   


 


ABG pO2   


 


ABG HCO3   


 


ABG O2 Saturation   


 


Oxyhemoglobin   


 


Sodium  136 L  


 


Potassium  5.5 H  


 


Chloride  94.8 L  


 


Carbon Dioxide   


 


BUN  130 H  


 


Creatinine  2.3 H  


 


Glucose  193 H  


 


POC Glucose    166 H


 


Calcium   


 


Phosphorus   


 


Total Bilirubin   


 


AST   


 


ALT   


 


Troponin T   


 


NT-Pro-B Natriuret Pep   


 


Total Protein   


 


Albumin   


 


LDL Cholesterol Direct   


 


Urine Creatinine   


 


Urine Total Protein   














  05/27/20





  12:29


 


WBC 


 


Hgb 


 


Hct 


 


MCV 


 


MCH 


 


RDW 


 


Plt Count 


 


Lymph % (Auto) 


 


Lymph # 


 


Seg Neutrophils % 


 


Seg Neuts % (Manual) 


 


Lymphocytes % (Manual) 


 


Nucleated RBC % 


 


Seg Neutrophils # 


 


Seg Neutrophils # Man 


 


Lymphocytes # (Manual) 


 


PT 


 


INR 


 


Heparin Anti-Xa Level 


 


ABG pH 


 


ABG pO2 


 


ABG HCO3 


 


ABG O2 Saturation 


 


Oxyhemoglobin 


 


Sodium 


 


Potassium 


 


Chloride 


 


Carbon Dioxide 


 


BUN 


 


Creatinine 


 


Glucose 


 


POC Glucose  181 H


 


Calcium 


 


Phosphorus 


 


Total Bilirubin 


 


AST 


 


ALT 


 


Troponin T 


 


NT-Pro-B Natriuret Pep 


 


Total Protein 


 


Albumin 


 


LDL Cholesterol Direct 


 


Urine Creatinine 


 


Urine Total Protein 











Allied health notes reviewed: nursing

## 2020-05-27 NOTE — DISCHARGE SUMMARY
Providers





- Providers


Date of Admission: 


05/16/20 23:26





Date of discharge: 05/27/20


Attending physician: 


LISA HINKLE





                                        





05/16/20


Consult to Cardiac Rehabilitation [CONS] Routine 


   Reason For Exam: Phase I





05/16/20 23:45


Consult to Physician [CONS] Routine 


   Comment: 


   Consulting Provider: GUERO BEDOYA


   Physician Instructions: 


   Reason For Exam: chest pain





05/18/20 16:27


Consult to Physician [CONS] Routine 


   Comment: 


   Consulting Provider: ANTONI OLMSTEAD


   Physician Instructions: 


   Reason For Exam: MILAN





05/18/20 18:25


Occupational Therapy Evaluate and Treat [CONS] Routine 


   Comment: 


   Reason For Exam: debility


Physical Therapy Evaluation and Treat [CONS] Routine 


   Comment: 


   Reason For Exam: debilty





05/19/20 12:26


Consult to Physician [CONS] Routine 


   Comment: 


   Consulting Provider: JEB HENLEY


   Physician Instructions: 


   Reason For Exam: Acute  Respiratory Failure





05/20/20 15:03


Consult to Dietitian/Nutrition [CONS] Routine 


   Physician Instructions: 


   Reason For Exam: 


   Reason for Consult: Write/Manage Tube Feeding





05/21/20 16:07


Consult to Dietitian/Nutrition [CONS] Routine 


   Physician Instructions: 


   Reason For Exam: 


   Reason for Consult: Write/Manage Tube Feeding





05/24/20 09:38


Speech Therapy Evaluation and Treat [CONS] Routine 


   Reason For Exam: evalaute swallow function











Primary care physician: 


PRIMARY CARE MD








Hospitalization


Condition: Stable


Pertinent studies: 





Chest x-rays, chest CT, abdomen x-rays, abdomen ultrasound, lower extremity 

Doppler, 2D echo


Hospital course: 


This is a 70-year-old woman with known history of COPD, HTN, and CHF presenting 

with lower extremity swelling, chest pain.  Patient is on home oxygen for COPD 

and she is also on Lasix for CHF, but was out of Lasix prior to admission. 

Admitted with acute hypoxic respiratory failure and COPD exacerbation as well as

 acute on chronic systolic congestive heart failure, fluid overload, pulmonary 

cardiology nephrology following the patient. She was restarted on diuretics, and

 was later found to have MILAN and hyponatremia prompting nephrology consultation.

 Patient was maintained on BiPAP and tube feeding, then BiPAP is weaned off, now

 on high-dose nasal cannula oxygen.  Had shock liver with LFTs in thousands, 

slowly trending down, Patient is obese, encephalopathic with poor prognosis.  

Patient was referred for LTAC placement and she was accepted.  However her 

overall prognosis remains poor.  Patient was discharged to LTAC in stable 

condition.








Discharge diagnosis and Mx:


/Metabolic encephalopathy; lethergy


likely due to MILAN, respiratory failure, 


cont to treat the underlying cause, supportive care


Nutrition; tube feeding as patient is lethargic


Speech therapy recommended PEG placement





/Acute on chronic hypoxic respiratory failure; s/p continuous BiPAP


Titrate O2 sats to more than 90%, nebulizers


Continue nebs and tapering dose of steroid





/Hypotension shock - required vasopressors;


Off vasopressors, stable blood pressures





/Severe pulmonary hypertension; on echo


due to Cor pulmonale, Continue current management, pulmonary following





/Acute exacerbation of COPD; 


on home oxygen


cont high flow Oxygen, nebulizers, tapering steroids.





/Acute on chronic systolic congestive heart failure; EF 20 to 25%


Continue heart failure medications, input output monitoring


Low-sodium diet, fluid restriction, cardiology was consulted





/Atrial flutter with variable conduction;


On Cardizem, chronic anticoagulation per cardiology


Sotalol discontinued by cardiology due to bradycardia 





/Severe thrombocytopenia; Heparin drip discontinued





/Acute transaminitis; shock liver due to liver congestion


Due to cardiomyopathy  And hypotension 


Trended down





/Acute kidney injury; due to ATN


Management per nephrology, avoid nephrotoxins


creatinine 0.7->1.3->1.9->2.8->3.4->2.9->2.3/2.4 with renal function stable past

 2-3 days.  


Suspect this is related to tubular injury in setting of hypotension





/Hyponatremia; mild improvement


Secondary to fluid overload, cautious Diuretic therapy, follow electrolytes, 

nephrology was following





/Symptomatic hypoglycemia; [5/19/2020]


Received D50, D10 IV fluids, significantly improved


Closely monitor blood sugars, adjust insulin as needed





/Generalized anasarca/edema


Due to cardiomyopathy, severe pulmonary hypertension, renal failure.  


Treat the underlying cause





/-Morbid obesity; BMI 38.6


Partly due to fluid overload


Continue supportive care





/-DVT prophylaxis;


s/p  heparin drip





--Full CODE STATUS.





Disposition: 


DC to LTAC 


patient has multiple medical problems


Critically ill, poor prognosis, Tube feeding per protocol








Hospitalist Physical





General appearance: Present: mild distress, well-nourished, obese (Morbidly 

obese), other (On high flow NC oxygen, lethergic)





- EENT


Eyes: Present: PERRL, EOM intact





- Neck


Neck: Present: supple, normal ROM





- Respiratory


Respiratory effort: normal


Respiratory: bilateral: diminished, rales, negative: rhonchi, wheezing





- Cardiovascular


Rhythm: regular


Heart Sounds: Present: S1 & S2





- Extremities


Extremities: no ischemia


Extremity abnormal: edema





- Abdominal


General gastrointestinal: soft, non-tender, non-distended, normal bowel sounds





- Integumentary


Integumentary: Present: clear, warm





- Psychiatric


Psychiatric: other (Confused noncommunicative)





- Neurologic


Neurologic: moves all extremities (Confused noncommunicative)








Disposition: DC/[facility]


Time spent for discharge: 34 minutes





Exam





- Constitutional


Vitals: 


                                        











Temp Pulse Resp BP Pulse Ox


 


 98.0 F   127 H  25 H  122/98   92 


 


 05/27/20 12:00  05/27/20 13:02  05/27/20 13:00  05/27/20 13:02  05/27/20 13:00














Plan


Activity: up only with assistance, fall precautions


Weight Bearing Status: Non-Weight Bearing


Diet: other (To feeding diet)


Special Instructions: record daily weights, record daily BP diary, record blood 

sugar diary, physical therapy, occupational therapy


Durable Medical Equipment Needed Upon Discharge: Oxygen (high flow)


Additional Instructions: monitor BMP, need PEG tube if mental status not 

improves


Follow up with: 


PRIMARY CARE,MD [Primary Care Provider] - 3-5 Days

## 2020-05-28 NOTE — DEATH SUMMARY
Death Summary





- Providers


Date of service: 05/28/20


Consults: 


                                        





05/16/20


Consult to Cardiac Rehabilitation [CONS] Routine 


   Reason For Exam: Phase I





05/16/20 23:45


Consult to Physician [CONS] Routine 


   Comment: 


   Consulting Provider: GUERO BEDOYA


   Physician Instructions: 


   Reason For Exam: chest pain





05/18/20 16:27


Consult to Physician [CONS] Routine 


   Comment: 


   Consulting Provider: ANTONI OLMSTEAD


   Physician Instructions: 


   Reason For Exam: MILAN





05/18/20 18:25


Occupational Therapy Evaluate and Treat [CONS] Routine 


   Comment: 


   Reason For Exam: debility


Physical Therapy Evaluation and Treat [CONS] Routine 


   Comment: 


   Reason For Exam: debilty





05/19/20 12:26


Consult to Physician [CONS] Routine 


   Comment: 


   Consulting Provider: JEB HENLEY


   Physician Instructions: 


   Reason For Exam: Acute  Respiratory Failure





05/20/20 15:03


Consult to Dietitian/Nutrition [CONS] Routine 


   Physician Instructions: 


   Reason For Exam: 


   Reason for Consult: Write/Manage Tube Feeding





05/21/20 16:07


Consult to Dietitian/Nutrition [CONS] Routine 


   Physician Instructions: 


   Reason For Exam: 


   Reason for Consult: Write/Manage Tube Feeding





05/24/20 09:38


Speech Therapy Evaluation and Treat [CONS] Routine 


   Reason For Exam: evalaute swallow function











Attending: 


LISA HINKLE








- Death summary


Date of admission: 


05/16/20 23:26





Date of Death: 05/27/20


Significant findings: 





This is a 70-year-old woman with known history of COPD, HTN, and CHF presented 

with lower extremity swelling, chest pain.  Patient was on home oxygen for COPD 

and she is also on Lasix for CHF, but was out of Lasix prior to admission. 

Admitted with acute hypoxic respiratory failure and COPD exacerbation as well as

 acute on chronic systolic congestive heart failure, fluid overload, pulmonary 

cardiology nephrology was following the patient. She was restarted on diuretics,

 and was later found to have MILAN and hyponatremia prompting nephrology 

consultation. Patient was maintained on BiPAP and tube feeding, then BiPAP was 

weaned off then placed on high-dose nasal cannula oxygen.  Had shock liver with 

LFTs in thousands, was slowly trending down. Patient was morbidly obese, and 

remained encephalopathic with poor prognosis.  Patient was referred for LTAC 

placement and she was accepted.  Her discharge was pending on [facility]TA[facility] bed 

availability. However her respiratory status declined and required intubation 

and transferred back to ICU. Then she started to became bradycardic and went 

into cardiac arrest with asystole. ACLS was initiated but unable to achieve her 

pulse back. She was then pronounce death by on call SANNA PRIEST. 








Cause of death:


cardiorespiratory arrest due to severe COPD, renal failure and CHF

## 2020-05-28 NOTE — EVENT NOTE
Date: 05/27/20





Code called on patient who has been on admission with non-ST elevation MI, 

history of COPD, history of dilated cardiomyopathy with about 20 to 25% ejection

fraction.  She had gone into respiratory distress earlier and was intubated.  

All attempts at resuscitation of patient going by ACLS protocol was 

unsuccessful.


Upon exam pupils were fixed and dilated.


Chest no breath sounds


Cardiovascular exam no peripheral pulses and no heart sounds


Abdomen distended


Extremities cold and clammy


Neurological exam no reflexes





Patient pronounced dead on May 27, 2020 at 2200


Family member (son) informed over the phone

## 2020-05-28 NOTE — PROGRESS NOTE
Assessment and Plan





/Metabolic encephalopathy; lethergy


likely due to MILAN, respiratory failure, 


cont to treat the underlying cause, supportive care


Nutrition; tube feeding as patient is lethargic


Speech therapy recommended PEG placement





/Acute on chronic hypoxic respiratory failure; s/p continuous BiPAP


Titrate O2 sats to more than 90%, nebulizers


Continue nebs and tapering dose of steroid





/Hypotension shock - required vasopressors;


Off vasopressors, stable blood pressures





/hyperkalemia, k slightly elevated


will repeat BMP, hyperkalemia protocol as appropriate





/Severe pulmonary hypertension; on echo


due to Cor pulmonale, Continue current management, pulmonary following





/Acute exacerbation of COPD; 


on home oxygen


cont high flow Oxygen, nebulizers, tapering steroids.





/Acute on chronic systolic congestive heart failure; EF 20 to 25%


Continue heart failure medications, input output monitoring


Low-sodium diet, fluid restriction, cardiology was consulted





/Atrial flutter with variable conduction;


placed on dogoxin, chronic anticoagulation per cardiology with eliquis low dose


Sotalol discontinued by cardiology due to bradycardia 


cardizem hold for hypotension





/Severe thrombocytopenia; Heparin drip discontinued





/Acute transaminitis; shock liver due to liver congestion


Due to cardiomyopathy  And hypotension 


Trended down





/Acute kidney injury; due to ATN


Management per nephrology, avoid nephrotoxins


creatinine 0.7->1.3->1.9->2.8->3.4->2.9->2.3/2.4 with renal function stable past

2-3 days.  


Suspect this is related to tubular injury in setting of hypotension





/Hyponatremia; mild improvement


Secondary to fluid overload, cautious Diuretic therapy, follow electrolytes, 

nephrology was following





/Symptomatic hypoglycemia; [5/19/2020]


Received D50, D10 IV fluids, significantly improved


Closely monitor blood sugars, adjust insulin as needed





/Generalized anasarca/edema


Due to cardiomyopathy, severe pulmonary hypertension, renal failure.  


Treat the underlying cause





/-Morbid obesity; BMI 38.6


Partly due to fluid overload


Continue supportive care





/-DVT prophylaxis;


s/p  heparin drip





--Full CODE STATUS.





Disposition: DC to LTAC when bed available


patient has multiple medical problems, Critically ill, poor prognosis, Tube feed

ing per protocol








Brief History:


This is a 70-year-old woman with known history of COPD, HTN, and CHF presenting 

with lower extremity swelling, chest pain.  Patient is on home oxygen for COPD 

and she is also on Lasix for CHF, but was out of Lasix prior to admission. 

Admitted with acute hypoxic respiratory failure and COPD exacerbation as well as

 acute on chronic systolic congestive heart failure, fluid overload, pulmonary 

cardiology nephrology following the patient. She was restarted on diuretics, and

 was later found to have MILAN and hyponatremia prompting nephrology consultation.

 Patient was maintained on BiPAP and tube feeding, then BiPAP is weaned off, now

 on high-dose nasal cannula oxygen.  Had shock liver with LFTs in thousands, 

slowly trending down, Patient is obese, encephalopathic with poor prognosis.  

Patient was referred for LTAC placement and she was accepted.  However her 

overall prognosis remains poor.  Pending LTAC discharge. 





Hospitalist Physical





General appearance: Present: mild distress,  obese (Morbidly obese), other (On 

high flow NC oxygen, lethergic)





- EENT


Eyes: Present: PERRL, EOM intact





- Neck


Neck: Present: supple, normal ROM





- Respiratory


Respiratory effort: normal


Respiratory: bilateral: diminished, rales, negative: rhonchi, wheezing





- Cardiovascular


Rhythm: regular


Heart Sounds: Present: S1 & S2





- Extremities


Extremities: no ischemia


Extremity abnormal: edema





- Abdominal


General gastrointestinal: soft, non-tender, non-distended, normal bowel sounds





- Integumentary


Integumentary: Present: clear, warm





- Psychiatric


Psychiatric: other (Confused noncommunicative)





- Neurologic


Neurologic: moves all extremities (Confused noncommunicative)












































































































































/Metabolic encephalopathy; lethergy


likely due to MILAN, respiratory failure, 


cont to treat the underlying cause, supportive care


Nutrition; tube feeding as patient is lethargic


Speech and swallow evaluation pending





/Acute on chronic hypoxic respiratory failure; on continuous BiPAP


Titrate O2 sats to more than 90%, nebulizers


IV steroids, pulmonary following,





/Hypotension shock - required vasopressors;


Off vasopressors, stable blood pressures





/Severe pulmonary hypertension; on echo


due to Cor pulmonale


Continue current management, pulmonary following





/Acute exacerbation of COPD; on home oxygen


cont high flow Oxygen, nebulizers, IV tapering steroids.





/Acute on chronic systolic congestive heart failure; EF 20 to 25%


Continue heart failure medications, input output monitoring


Low-sodium diet, fluid restriction, cardiology following





/Atrial flutter with variable conduction;


On Cardizem, chronic anticoagulation per cardiology


Sotalol discontinued by cardiology due to bradycardia 





/Severe thrombocytopenia; Heparin drip discontinued


HIT panel ordered, pending report





/Acute transaminitis; shock liver due to liver congestion


Due to cardiomyopathy  And hypotension 


Trending down





/Acute kidney injury; due to ATN


Management per nephrology, avoid nephrotoxins


creatinine 0.7->1.3->1.9->2.8->3.4->2.9->2.3/2.4 with renal function stable past

 2-3 days.  


Suspect this is related to tubular injury in setting of hypotension





/Hyponatremia; mild improvement


Secondary to fluid overload, cautious Diuretic therapy, follow electrolytes, 

nephrology following





/Symptomatic hypoglycemia; [5/19/2020]


Received D50, D10 IV fluids, significantly improved


Closely monitor blood sugars adjust as needed





/Generalized anasarca/edema


Due to cardiomyopathy, severe pulmonary hypertension


renal failure.  Treat the underlying cause





/-Morbid obesity; BMI 38.6


Partly due to fluid overload


Continue supportive care





/-DVT prophylaxis;


s/p  heparin drip





--Full CODE STATUS.





Patient has multiple medical problems


Critically ill, poor prognosis


Plan of care reviewed with the patient'S nurse


Tube feeding per protocol





Critical care time 35 minutes








Brief history:


This is a 70-year-old woman with known history of COPD, HTN, and CHF presenting 

with lower extremity swelling, chest pain.  Patient is on home oxygen for COPD 

and she is also on Lasix for CHF, but was out of Lasix prior to admission. 

Admitted with acute hypoxic respiratory failure and COPD exacerbation as well as

 acute on chronic systolic congestive heart failure, fluid overload, pulmonary 

cardiology nephrology following the patient. She was restarted on diuretics, and

 was later found to have MILAN and hyponatremia prompting nephrology consultation.

 Patient was maintained on BiPAP and tube feeding, then BiPAP is weaned off, now

 on high-dose nasal cannula oxygen.  Had shock liver with LFTs in thousands, 

slowly trending down, Patient is obese, encephalopathic with poor prognosis. 





Hospitalist Physical





General appearance: Present: mild distress, well-nourished, obese (Morbidly 

obese), other (On high flow NC oxygen, lethergic)





- EENT


Eyes: Present: PERRL, EOM intact





- Neck


Neck: Present: supple, normal ROM





- Respiratory


Respiratory effort: normal


Respiratory: bilateral: diminished, rales, negative: rhonchi, wheezing





- Cardiovascular


Rhythm: regular


Heart Sounds: Present: S1 & S2





- Extremities


Extremities: no ischemia


Extremity abnormal: edema





- Abdominal


General gastrointestinal: soft, non-tender, non-distended, normal bowel sounds





- Integumentary


Integumentary: Present: clear, warm





- Psychiatric


Psychiatric: other (Confused noncommunicative)





- Neurologic


Neurologic: moves all extremities (Confused noncommunicative)








Subjective


Date of service: 05/27/20


Principal diagnosis: Ac hypoxemic and hypercapnic resp failure; AE-COPD; NSTEMI;

 MILAN; HFrEF


Interval history: 





Patient seen and examined.  Medical records and medication list reviewed.  


No acute event overnight noted by the RN.  


Patient remained on high flow O2, on tube feeding, very lethargic


Discussed plan of care at bedside with patient's RN an SUGEY.


pending Ltac discharge





Objective





- Labs


CBC & Chem 7: 


                                 05/27/20 05:09





                                 05/27/20 05:09


Labs: 


                              Abnormal lab results











  05/27/20 05/27/20 05/27/20 Range/Units





  12:29 18:16 20:00 


 


ABG pH    7.289 L  (7.350-7.450)  pH Units


 


ABG pO2    50.6 L  (80.0-90.0)  mm Hg


 


ABG HCO3    27.2 H  (20.0-26.0)  mmol/L


 


ABG O2 Saturation    81.7 L  (95.0-99.0)  %


 


ABG Hemoglobin    16.8 H  (12.0-16.0)  gm/dl


 


Oxyhemoglobin    79.8 L  (95.0-99.0)  %


 


POC Glucose  181 H  164 H   ()  














HEART Score





- HEART Score


EKG: Non-specific


Age: > 65


Risk factors: > 3 risk factors or hx of atherosclerotic disease


Troponin: 


                                        











Troponin T  0.126 ng/mL (0.00-0.029)  H*  05/17/20  05:29    











Troponin: < normal limit





- Critical Actions


Critical Actions: 4-6 pts:12-16.6% risk of adverse cardiac event. Should be 

admitted